# Patient Record
Sex: FEMALE | HISPANIC OR LATINO | Employment: OTHER | URBAN - METROPOLITAN AREA
[De-identification: names, ages, dates, MRNs, and addresses within clinical notes are randomized per-mention and may not be internally consistent; named-entity substitution may affect disease eponyms.]

---

## 2020-10-22 RX ORDER — AMLODIPINE BESYLATE 10 MG/1
10 TABLET ORAL DAILY
COMMUNITY
Start: 2020-10-17 | End: 2021-01-15

## 2020-10-22 RX ORDER — MECLIZINE HCL 12.5 MG/1
12.5 TABLET ORAL
COMMUNITY
Start: 2020-10-17 | End: 2020-10-27

## 2020-10-22 RX ORDER — ATORVASTATIN CALCIUM 20 MG/1
20 TABLET, FILM COATED ORAL DAILY
COMMUNITY

## 2020-10-22 RX ORDER — SERTRALINE HYDROCHLORIDE 25 MG/1
25 TABLET, FILM COATED ORAL DAILY
COMMUNITY
End: 2021-12-08

## 2020-10-22 RX ORDER — LOSARTAN POTASSIUM 100 MG/1
100 TABLET ORAL DAILY
COMMUNITY
Start: 2020-10-16 | End: 2023-04-03

## 2020-10-22 RX ORDER — HYDROCHLOROTHIAZIDE 12.5 MG/1
12.5 TABLET ORAL DAILY
COMMUNITY
Start: 2020-10-18 | End: 2023-04-03

## 2020-10-22 RX ORDER — ASPIRIN 81 MG/1
81 TABLET, CHEWABLE ORAL DAILY
COMMUNITY
Start: 2020-10-18 | End: 2020-11-17

## 2020-10-23 ENCOUNTER — OFFICE VISIT (OUTPATIENT)
Dept: NEUROLOGY | Facility: CLINIC | Age: 85
End: 2020-10-23

## 2020-10-23 VITALS — HEART RATE: 86 BPM | HEIGHT: 60 IN | WEIGHT: 140 LBS | OXYGEN SATURATION: 98 % | BODY MASS INDEX: 27.48 KG/M2

## 2020-10-23 DIAGNOSIS — G43.009 MIGRAINE WITHOUT AURA AND WITHOUT STATUS MIGRAINOSUS, NOT INTRACTABLE: ICD-10-CM

## 2020-10-23 DIAGNOSIS — G47.00 INSOMNIA, UNSPECIFIED TYPE: ICD-10-CM

## 2020-10-23 DIAGNOSIS — R42 VERTIGO: Primary | ICD-10-CM

## 2020-10-23 DIAGNOSIS — Z86.73 HISTORY OF STROKE: ICD-10-CM

## 2020-10-23 DIAGNOSIS — R51.9 CHRONIC DAILY HEADACHE: ICD-10-CM

## 2020-10-23 PROCEDURE — 99204 OFFICE O/P NEW MOD 45 MIN: CPT | Performed by: PSYCHIATRY & NEUROLOGY

## 2020-10-23 RX ORDER — TOPIRAMATE 25 MG/1
25 TABLET ORAL NIGHTLY
Qty: 30 TABLET | Refills: 4 | Status: SHIPPED | OUTPATIENT
Start: 2020-10-23 | End: 2021-02-24 | Stop reason: SDUPTHER

## 2020-10-23 RX ORDER — INFLUENZA A VIRUS A/MICHIGAN/45/2015 X-275 (H1N1) ANTIGEN (FORMALDEHYDE INACTIVATED), INFLUENZA A VIRUS A/SINGAPORE/INFIMH-16-0019/2016 IVR-186 (H3N2) ANTIGEN (FORMALDEHYDE INACTIVATED), INFLUENZA B VIRUS B/PHUKET/3073/2013 ANTIGEN (FORMALDEHYDE INACTIVATED), AND INFLUENZA B VIRUS B/MARYLAND/15/2016 BX-69A ANTIGEN (FORMALDEHYDE INACTIVATED) 60; 60; 60; 60 UG/.7ML; UG/.7ML; UG/.7ML; UG/.7ML
INJECTION, SUSPENSION INTRAMUSCULAR
COMMUNITY
Start: 2020-08-27 | End: 2023-04-03

## 2020-10-23 RX ORDER — ALPRAZOLAM 0.25 MG/1
0.25 TABLET, ORALLY DISINTEGRATING ORAL NIGHTLY PRN
Qty: 30 TABLET | Refills: 1 | Status: SHIPPED | OUTPATIENT
Start: 2020-10-23 | End: 2020-12-28

## 2020-10-23 NOTE — PROGRESS NOTES
Chief Complaint   Patient presents with   • Stroke   • Dizziness   • Headache       Patient ID: Norma Castillo is a 89 y.o. female.    HPI:  I have had the pleasure of seeing your patient today.  As you may know she is a 89-year-old female with a history of stroke.  She was seen acutely at [facility] after experiencing slurred speech and facial droop.  This was back approximately 1 week ago now.  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  They question some type of cardiac issue and she now has a loop recorder.  She was also started on Eliquis and takes aspirin 81 mg daily.  She is taking Lipitor as well now.  She has not had any new strokelike symptoms.  Her daughter who accompanies her today states that she has had vertigo for many years.  It is a room spinning-like sensation that tends to worsen with changes of position.  She has no evidence for vertebrobasilar insufficiency on CTA evaluation during her hospitalization.  The patient also suffers from severe insomnia as well as chronic headaches.  The headaches are a frontal and temporal type head pain.  Occasionally it radiates from the occipital head region.  It can be pounding or pressure-like sensation.  She will have some sensitivity to light.  No nausea or vomiting.  She says that she essentially has a headache every day.  She typically takes Tylenol now that she is taking Eliquis which is somewhat helpful however her headache does typically recur at some point during the day.  She previously had been treated with alprazolam 0.25 mg for her dizziness as well as anxiety.  She has tried meclizine 12.5 mg which was not very helpful.  She denies any new stroke symptoms.  No focal weakness of her arms or legs.  No facial droop.  Her daughter says that her anxiety is quite severe.  As well the dizziness is a daily occurrence for her.    The following portions of the patient's history were reviewed and  updated as appropriate: allergies, current medications, past family history, past medical history, past social history, past surgical history and problem list.    Review of Systems   Constitutional: Negative for activity change, appetite change and fatigue.   HENT: Negative for facial swelling, hearing loss and trouble swallowing.    Eyes: Negative for photophobia, redness and visual disturbance.   Respiratory: Negative for chest tightness, shortness of breath and wheezing.    Cardiovascular: Negative for chest pain, palpitations and leg swelling.   Gastrointestinal: Negative for abdominal pain, nausea and vomiting.   Endocrine: Negative for cold intolerance, heat intolerance and polydipsia.   Musculoskeletal: Negative for back pain, gait problem and neck pain.   Skin: Negative for color change, rash and wound.   Allergic/Immunologic: Negative for environmental allergies, food allergies and immunocompromised state.   Neurological: Positive for dizziness and headaches. Negative for tremors, seizures, syncope, facial asymmetry, speech difficulty, weakness, light-headedness and numbness.   Hematological: Negative for adenopathy. Does not bruise/bleed easily.   Psychiatric/Behavioral: Positive for dysphoric mood. Negative for agitation, behavioral problems, confusion, decreased concentration, hallucinations, self-injury, sleep disturbance and suicidal ideas. The patient is nervous/anxious. The patient is not hyperactive.       I have reviewed the review of systems above performed by my medical assistant.      Vitals:    10/23/20 1513   Pulse: 86   SpO2: 98%       Neurologic Exam     Mental Status   Oriented to person, place, and time.   Registration: recalls 3 of 3 objects. Follows 3 step commands.   Attention: normal. Concentration: normal.   Speech: speech is normal   Level of consciousness: alert  Knowledge: consistent with education (No deficits found.).   Normal comprehension.     Cranial Nerves     CN II   Visual  fields full to confrontation.     CN III, IV, VI   Pupils are equal, round, and reactive to light.  Extraocular motions are normal.   CN III: no CN III palsy  CN VI: no CN VI palsy  Nystagmus: none   Diplopia: none    CN V   Facial sensation intact.     CN VII   Facial expression full, symmetric.     CN VIII   CN VIII normal.     CN IX, X   CN IX normal.   CN X normal.     CN XI   CN XI normal.     CN XII   CN XII normal.     Motor Exam   Muscle bulk: normal  Right arm tone: normal  Left arm tone: normal  Right leg tone: normal  Left leg tone: normal    Strength   Right neck flexion: 5/5  Left neck flexion: 5/5  Right neck extension: 5/5  Left neck extension: 5/5  Right deltoid: 5/5  Left deltoid: 5/5  Right biceps: 5/5  Left biceps: 5/5  Right triceps: 5/5  Left triceps: 5/5  Right wrist flexion: 5/5  Left wrist flexion: 5/5  Right wrist extension: 5/5  Left wrist extension: 5/5  Right interossei: 5/5  Left interossei: 5/5  Right abdominals: 5/5  Left abdominals: 5/5  Right iliopsoas: 5/5  Left iliopsoas: 5/5  Right quadriceps: 5/5  Left quadriceps: 5/5  Right hamstrin/5  Left hamstrin/5  Right glutei: 5/5  Left glutei: 5/5  Right anterior tibial: 5/5  Left anterior tibial: 5/5  Right posterior tibial: 5/5  Left posterior tibial: 5/5  Right peroneal: 5/5  Left peroneal: 5/5  Right gastroc: 5/5  Left gastroc: 5/5    Sensory Exam   Light touch normal.   Vibration normal.   Proprioception normal.   Pinprick normal.     Gait, Coordination, and Reflexes     Gait  Gait: normal    Coordination   Romberg: negative    Tremor   Resting tremor: absent  Intention tremor: absent    Reflexes   Right brachioradialis: 2+  Left brachioradialis: 2+  Right biceps: 2+  Left biceps: 2+  Right triceps: 2+  Left triceps: 2+  Right patellar: 2+  Left patellar: 2+  Right achilles: 2+  Left achilles: 2+  Right : 2+  Left : 2+Station is normal.       Physical Exam  Vitals signs reviewed.   Constitutional:       General: She  is not in acute distress.     Appearance: She is well-developed.   HENT:      Head: Normocephalic and atraumatic.   Eyes:      Extraocular Movements: EOM normal.      Pupils: Pupils are equal, round, and reactive to light.   Neck:      Musculoskeletal: Normal range of motion.   Cardiovascular:      Rate and Rhythm: Normal rate and regular rhythm.      Heart sounds: Normal heart sounds.   Pulmonary:      Effort: Pulmonary effort is normal. No respiratory distress.      Breath sounds: Normal breath sounds.   Abdominal:      General: Bowel sounds are normal. There is no distension.      Palpations: Abdomen is soft.      Tenderness: There is no abdominal tenderness.   Musculoskeletal:         General: No deformity.   Skin:     General: Skin is warm.      Findings: No rash.   Neurological:      Mental Status: She is oriented to person, place, and time.      Coordination: Romberg Test normal.      Gait: Gait is intact.      Deep Tendon Reflexes:      Reflex Scores:       Tricep reflexes are 2+ on the right side and 2+ on the left side.       Bicep reflexes are 2+ on the right side and 2+ on the left side.       Brachioradialis reflexes are 2+ on the right side and 2+ on the left side.       Patellar reflexes are 2+ on the right side and 2+ on the left side.       Achilles reflexes are 2+ on the right side and 2+ on the left side.  Psychiatric:         Speech: Speech normal.         Judgment: Judgment normal.         Procedures    Assessment/Plan: I would like to start her on low-dose Topamax 25 mg at night to try and bring the headache frequency down for her.  As well we will go ahead and start her back on the alprazolam since she was taking that for many years for the dizziness and the anxiety.  We will use 0.25 mg nightly.  Her Zoloft was recently increased to 50 mg daily so hopefully that will also help with the anxiety.  I would like to refer her to the [facility] for a vestibular assessment.  We will  see her back after this evaluation.       Diagnoses and all orders for this visit:    1. Vertigo (Primary)  -     ALPRAZolam (NIRAVAM) 0.25 MG disintegrating tablet; Place 1 tablet on the tongue At Night As Needed for Anxiety for up to 30 days.  Dispense: 30 tablet; Refill: 1  -     Basic Vestibular Evaluation; Future    2. History of stroke    3. Chronic daily headache  -     topiramate (TOPAMAX) 25 MG tablet; Take 1 tablet by mouth Every Night for 30 days.  Dispense: 30 tablet; Refill: 4    4. Migraine without aura and without status migrainosus, not intractable  -     topiramate (TOPAMAX) 25 MG tablet; Take 1 tablet by mouth Every Night for 30 days.  Dispense: 30 tablet; Refill: 4    5. Insomnia, unspecified type  -     ALPRAZolam (NIRAVAM) 0.25 MG disintegrating tablet; Place 1 tablet on the tongue At Night As Needed for Anxiety for up to 30 days.  Dispense: 30 tablet; Refill: 1           Jeremiah Garcia II, MD           No

## 2020-12-26 DIAGNOSIS — G47.00 INSOMNIA, UNSPECIFIED TYPE: ICD-10-CM

## 2020-12-26 DIAGNOSIS — R42 VERTIGO: ICD-10-CM

## 2020-12-31 RX ORDER — ALPRAZOLAM 0.25 MG/1
TABLET, ORALLY DISINTEGRATING ORAL
Qty: 30 TABLET | Refills: 0 | Status: SHIPPED | OUTPATIENT
Start: 2020-12-31 | End: 2021-01-27

## 2021-01-27 DIAGNOSIS — R42 VERTIGO: ICD-10-CM

## 2021-01-27 DIAGNOSIS — G47.00 INSOMNIA, UNSPECIFIED TYPE: ICD-10-CM

## 2021-01-27 RX ORDER — ALPRAZOLAM 0.25 MG/1
TABLET, ORALLY DISINTEGRATING ORAL
Qty: 30 TABLET | Refills: 2 | Status: SHIPPED | OUTPATIENT
Start: 2021-01-27 | End: 2021-08-06

## 2021-01-27 NOTE — TELEPHONE ENCOUNTER
Please advise. It looks like your started this on pt apt on 10/23/2020. It was sent in 3 weeks ago. Please advise. Pt has a f/u on 02/24/2021. Thank you.

## 2021-02-24 ENCOUNTER — TELEMEDICINE (OUTPATIENT)
Dept: NEUROLOGY | Facility: CLINIC | Age: 86
End: 2021-02-24

## 2021-02-24 DIAGNOSIS — R51.9 CHRONIC DAILY HEADACHE: ICD-10-CM

## 2021-02-24 DIAGNOSIS — G43.009 MIGRAINE WITHOUT AURA AND WITHOUT STATUS MIGRAINOSUS, NOT INTRACTABLE: ICD-10-CM

## 2021-02-24 DIAGNOSIS — R42 VERTIGO: Primary | ICD-10-CM

## 2021-02-24 DIAGNOSIS — Z86.73 HISTORY OF STROKE: ICD-10-CM

## 2021-02-24 PROCEDURE — 99212 OFFICE O/P EST SF 10 MIN: CPT | Performed by: PSYCHIATRY & NEUROLOGY

## 2021-02-24 RX ORDER — TOPIRAMATE 25 MG/1
25 TABLET ORAL NIGHTLY
Qty: 30 TABLET | Refills: 4 | Status: SHIPPED | OUTPATIENT
Start: 2021-02-24 | End: 2021-08-06 | Stop reason: SDUPTHER

## 2021-02-24 NOTE — PROGRESS NOTES
Chief complaint: History of stroke, vertigo and migraines    Patient ID: Norma Castillo is a 89 y.o. female.    HPI: This was an audio and video enabled telemedicine encounter.  The patient did consent to this type of encounter.  I am in the neurology clinic and they are at home.  I have had the pleasure of seeing Norma today in the neurology clinic.  As you may know she is a 89-year-old female with a history of those stated above.  She says that she is doing better.  She has not had that many headaches since her last visit with us.  We did start her on topiramate 25 mg at night.  She has not had any side effects to the medication.  She reports no significant headaches since the initiation of medication.  She does have a history of vertigo and we tried to schedule her a visit with the [facility].  She has not yet seen them stating that they were told that they would get a call back regarding a future visit.    The following portions of the patient's history were reviewed and updated as appropriate: allergies, current medications, past family history, past medical history, past social history, past surgical history and problem list.    Review of Systems   I have reviewed the review of systems above performed by my medical assistant.      There were no vitals filed for this visit.    Neurologic Exam    Physical Exam    Procedures    Assessment/Plan: We will continue with her topiramate for now.  With respect to the [facility] she will try and contact them to get scheduled if they need assistance from us we will do so.  Otherwise we will see them back after that visit.  A total of 15 minutes was spent during this visit discussing signs and symptoms of chronic daily headache, migraine headache, vertigo, patient education, plan of care and prognosis.       Diagnoses and all orders for this visit:    1. Vertigo (Primary)    2. History of stroke    3. Migraine without aura and without status  migrainosus, not intractable  -     topiramate (TOPAMAX) 25 MG tablet; Take 1 tablet by mouth Every Night for 30 days.  Dispense: 30 tablet; Refill: 4    4. Chronic daily headache  -     topiramate (TOPAMAX) 25 MG tablet; Take 1 tablet by mouth Every Night for 30 days.  Dispense: 30 tablet; Refill: 4           Jeremiah Garcia II, MD

## 2021-03-09 DIAGNOSIS — Z23 IMMUNIZATION DUE: ICD-10-CM

## 2021-06-09 RX ORDER — ALPRAZOLAM 0.25 MG/1
TABLET ORAL
Qty: 30 TABLET | Refills: 5 | Status: SHIPPED | OUTPATIENT
Start: 2021-06-09 | End: 2021-08-06

## 2021-06-21 ENCOUNTER — TELEPHONE (OUTPATIENT)
Dept: NEUROLOGY | Facility: CLINIC | Age: 86
End: 2021-06-21

## 2021-06-21 NOTE — TELEPHONE ENCOUNTER
Provider: DR NAIR    Caller: MIMI     Relationship to Patient: DAUGHTER    Phone Number: [phone]    Reason for Call: THE PT'S DAUGHTER CALLED IN TODAY BECAUSE SHE WANTED TO SPEAK WITH HERMAN REGARDING SOME PAPERWORK AND THIS IS ALL OF THE INFORMATION SHE GAVE ME. PLEASE GIVE HER A CALL BACK WHEN POSSIBLE.

## 2021-06-25 NOTE — TELEPHONE ENCOUNTER
MIMI - DAUGHTER  [phone]    THE PT'S DAUGHTER WAS RETURNING HERMAN'S PHONE CALL. PER LINDA SPARROWHEL WAS OUT OF THE OFFICE TODAY AND WILL BE BACK Monday. THE PT'S DAUGHTER EXPRESSED UNDERSTANDING. PLEASE GIVE HER A CALL BACK Monday.

## 2021-06-28 NOTE — TELEPHONE ENCOUNTER
Called and spoke to marivel. We will be receiving some paperwork  On pt and will review and fill out best we can per DR. Garcia.

## 2021-08-06 ENCOUNTER — OFFICE VISIT (OUTPATIENT)
Dept: NEUROLOGY | Facility: CLINIC | Age: 86
End: 2021-08-06

## 2021-08-06 VITALS
WEIGHT: 138 LBS | OXYGEN SATURATION: 98 % | DIASTOLIC BLOOD PRESSURE: 70 MMHG | HEART RATE: 84 BPM | BODY MASS INDEX: 27.09 KG/M2 | HEIGHT: 60 IN | SYSTOLIC BLOOD PRESSURE: 114 MMHG

## 2021-08-06 DIAGNOSIS — R51.9 CHRONIC DAILY HEADACHE: ICD-10-CM

## 2021-08-06 DIAGNOSIS — G47.00 INSOMNIA, UNSPECIFIED TYPE: ICD-10-CM

## 2021-08-06 DIAGNOSIS — R41.89 COGNITIVE IMPAIRMENT: ICD-10-CM

## 2021-08-06 DIAGNOSIS — G43.009 MIGRAINE WITHOUT AURA AND WITHOUT STATUS MIGRAINOSUS, NOT INTRACTABLE: ICD-10-CM

## 2021-08-06 DIAGNOSIS — Z86.73 HISTORY OF STROKE: Primary | ICD-10-CM

## 2021-08-06 DIAGNOSIS — R42 VERTIGO: ICD-10-CM

## 2021-08-06 PROCEDURE — 99212 OFFICE O/P EST SF 10 MIN: CPT | Performed by: PSYCHIATRY & NEUROLOGY

## 2021-08-06 RX ORDER — TOPIRAMATE 25 MG/1
25 TABLET ORAL NIGHTLY
Qty: 90 TABLET | Refills: 2 | Status: SHIPPED | OUTPATIENT
Start: 2021-08-06 | End: 2022-02-08

## 2021-08-06 RX ORDER — ASPIRIN 81 MG/1
TABLET ORAL DAILY
COMMUNITY

## 2021-08-06 RX ORDER — ALPRAZOLAM 0.25 MG/1
0.25 TABLET ORAL NIGHTLY PRN
Qty: 90 TABLET | Refills: 2 | Status: SHIPPED | OUTPATIENT
Start: 2021-08-06 | End: 2022-04-29

## 2021-08-06 NOTE — PROGRESS NOTES
Chief Complaint   Patient presents with   • Stroke       Patient ID: Norma Castillo is a 89 y.o. female.    HPI: I have had the pleasure of seeing your patient today. As you may know she is a 89-year-old female with a history of stroke. She has a history of chronic headaches and dizziness. She is scheduled to see the [facility] soon. She is doing much better with respect to headache. She is taking topiramate 25 mg nightly. She denies any side effects. She denies any new stroke symptoms. No new onset focal weakness or numbness. Cognitively she is approximately the same.    The following portions of the patient's history were reviewed and updated as appropriate: allergies, current medications, past family history, past medical history, past social history, past surgical history and problem list.    Review of Systems   Musculoskeletal: Positive for gait problem (pt feel last week due to slippers. ). Negative for back pain and neck pain.   Allergic/Immunologic: Negative for environmental allergies, food allergies and immunocompromised state.   Neurological: Negative for dizziness, tremors, seizures, syncope, facial asymmetry, speech difficulty, weakness, light-headedness, numbness and headaches.   Hematological: Negative for adenopathy. Does not bruise/bleed easily.   Psychiatric/Behavioral: Negative for agitation, behavioral problems, confusion, decreased concentration, dysphoric mood, hallucinations, self-injury, sleep disturbance and suicidal ideas. The patient is not nervous/anxious and is not hyperactive.       I have reviewed the review of systems above performed by my medical assistant.      Vitals:    08/06/21 1118   BP: 114/70   Pulse: 84   SpO2: 98%       Neurologic Exam     Mental Status   Oriented to person, place, and time.   Registration: recalls 1 of 3 objects. Recall at 5 minutes: recalls 1 of 3 objects. Follows 3 step commands.   Attention: normal. Concentration: normal.   Speech: speech is  normal   Level of consciousness: alert  Knowledge: poor.   Normal comprehension.     Cranial Nerves     CN II   Visual fields full to confrontation.     CN III, IV, VI   Pupils are equal, round, and reactive to light.  Extraocular motions are normal.   CN III: no CN III palsy  CN VI: no CN VI palsy  Nystagmus: none   Diplopia: none    CN V   Facial sensation intact.     CN VII   Facial expression full, symmetric.     CN VIII   CN VIII normal.     CN IX, X   CN IX normal.   CN X normal.     CN XI   CN XI normal.     CN XII   CN XII normal.     Motor Exam   Muscle bulk: normal  Right arm tone: normal  Left arm tone: normal  Right leg tone: normal  Left leg tone: normal    Strength   Right neck flexion: 5/5  Left neck flexion: 5/5  Right neck extension: 5/5  Left neck extension: 5/5  Right deltoid: 5/5  Left deltoid: 5/5  Right biceps: 5/5  Left biceps: 5/5  Right triceps: 5/5  Left triceps: 5/5  Right wrist flexion: 5/5  Left wrist flexion: 5/5  Right wrist extension: 5/5  Left wrist extension: 5/5  Right interossei: 5/5  Left interossei: 5/5  Right abdominals: 5/5  Left abdominals: 5/5  Right iliopsoas: 5/5  Left iliopsoas: 5/5  Right quadriceps: 5/5  Left quadriceps: 5/5  Right hamstrin/5  Left hamstrin/5  Right glutei: 5/5  Left glutei: 5/5  Right anterior tibial: 5/5  Left anterior tibial: 5/5  Right posterior tibial: 5/5  Left posterior tibial: 5/5  Right peroneal: 5/5  Left peroneal: 5/5  Right gastroc: 5/5  Left gastroc: 5/5    Sensory Exam   Light touch normal.   Vibration normal.   Proprioception normal.   Pinprick normal.     Gait, Coordination, and Reflexes     Gait  Gait: normal    Coordination   Romberg: negative    Tremor   Resting tremor: absent  Intention tremor: absent    Reflexes   Right brachioradialis: 2+  Left brachioradialis: 2+  Right biceps: 2+  Left biceps: 2+  Right triceps: 2+  Left triceps: 2+  Right patellar: 2+  Left patellar: 2+  Right achilles: 2+  Left achilles: 2+  Right :  2+  Left : 2+Station is normal.       Physical Exam  Vitals reviewed.   Constitutional:       Appearance: She is well-developed.   HENT:      Head: Normocephalic and atraumatic.   Eyes:      Extraocular Movements: EOM normal.      Pupils: Pupils are equal, round, and reactive to light.   Cardiovascular:      Rate and Rhythm: Normal rate and regular rhythm.   Pulmonary:      Breath sounds: Normal breath sounds.   Musculoskeletal:         General: Normal range of motion.   Skin:     General: Skin is warm.   Neurological:      Mental Status: She is oriented to person, place, and time.      Coordination: Romberg Test normal.      Gait: Gait is intact.      Deep Tendon Reflexes:      Reflex Scores:       Tricep reflexes are 2+ on the right side and 2+ on the left side.       Bicep reflexes are 2+ on the right side and 2+ on the left side.       Brachioradialis reflexes are 2+ on the right side and 2+ on the left side.       Patellar reflexes are 2+ on the right side and 2+ on the left side.       Achilles reflexes are 2+ on the right side and 2+ on the left side.  Psychiatric:         Speech: Speech normal.         Procedures    Assessment/Plan: We will refill her alprazolam and topiramate. We will see her in approximately 4 months or sooner if needed. A total of 15 minutes was spent face-to-face with the patient today. Of that greater than 50% of this time was spent discussing signs and symptoms of dizziness, headaches, history of stroke, cognitive impairment, patient education, plan of care and prognosis.       Diagnoses and all orders for this visit:    1. History of stroke (Primary)    2. Vertigo    3. Chronic daily headache  -     topiramate (TOPAMAX) 25 MG tablet; Take 1 tablet by mouth Every Night for 90 days.  Dispense: 90 tablet; Refill: 2    4. Cognitive impairment    5. Migraine without aura and without status migrainosus, not intractable  -     topiramate (TOPAMAX) 25 MG tablet; Take 1 tablet by mouth  Every Night for 90 days.  Dispense: 90 tablet; Refill: 2    6. Insomnia, unspecified type  -     ALPRAZolam (XANAX) 0.25 MG tablet; Take 1 tablet by mouth At Night As Needed for Anxiety for up to 90 days.  Dispense: 90 tablet; Refill: 2           Jeremiah Garcia II, MD

## 2021-09-28 ENCOUNTER — IMMUNIZATION (OUTPATIENT)
Dept: VACCINE CLINIC | Facility: HOSPITAL | Age: 86
End: 2021-09-28

## 2021-09-28 PROCEDURE — 0001A: CPT | Performed by: INTERNAL MEDICINE

## 2021-09-28 PROCEDURE — 0003A: CPT | Performed by: INTERNAL MEDICINE

## 2021-09-28 PROCEDURE — 91300 HC SARSCOV02 VAC 30MCG/0.3ML IM: CPT | Performed by: INTERNAL MEDICINE

## 2021-12-08 ENCOUNTER — OFFICE VISIT (OUTPATIENT)
Dept: NEUROLOGY | Facility: CLINIC | Age: 86
End: 2021-12-08

## 2021-12-08 VITALS
BODY MASS INDEX: 27.88 KG/M2 | HEIGHT: 60 IN | WEIGHT: 142 LBS | OXYGEN SATURATION: 98 % | DIASTOLIC BLOOD PRESSURE: 76 MMHG | HEART RATE: 76 BPM | SYSTOLIC BLOOD PRESSURE: 122 MMHG

## 2021-12-08 DIAGNOSIS — R42 VERTIGO: ICD-10-CM

## 2021-12-08 DIAGNOSIS — Z86.73 HISTORY OF STROKE: Primary | ICD-10-CM

## 2021-12-08 DIAGNOSIS — F33.0 MILD RECURRENT MAJOR DEPRESSION (HCC): ICD-10-CM

## 2021-12-08 PROCEDURE — 99212 OFFICE O/P EST SF 10 MIN: CPT | Performed by: PSYCHIATRY & NEUROLOGY

## 2021-12-08 RX ORDER — SERTRALINE HYDROCHLORIDE 25 MG/1
25 TABLET, FILM COATED ORAL DAILY
Qty: 30 TABLET | Refills: 4 | Status: SHIPPED | OUTPATIENT
Start: 2021-12-08 | End: 2021-12-13
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
## 2021-12-09 ENCOUNTER — TELEPHONE (OUTPATIENT)
Dept: NEUROLOGY | Facility: CLINIC | Age: 86
End: 2021-12-09

## 2021-12-09 DIAGNOSIS — F33.0 MILD RECURRENT MAJOR DEPRESSION (HCC): Primary | ICD-10-CM

## 2021-12-09 NOTE — TELEPHONE ENCOUNTER
Caller: MIMI Bhat call back number: [phone]    What medications are you currently taking:   Current Outpatient Medications on File Prior to Visit   Medication Sig Dispense Refill   • aspirin (aspirin) 81 MG EC tablet Daily.     • atorvastatin (LIPITOR) 20 MG tablet Take 20 mg by mouth Daily.     • Fluzone High-Dose Quadrivalent 0.7 ML suspension prefilled syringe TO BE ADMINISTERED BY PHARMACIST FOR IMMUNIZATION     • hydroCHLOROthiazide (HYDRODIURIL) 12.5 MG tablet Take 12.5 mg by mouth Daily.     • losartan (COZAAR) 100 MG tablet Take 100 mg by mouth Daily.     • metoprolol tartrate (LOPRESSOR) 25 MG tablet Take 25 mg by mouth.     • sertraline (ZOLOFT) 25 MG tablet Take 1 tablet by mouth Daily for 30 days. 30 tablet 4   • topiramate (TOPAMAX) 25 MG tablet Take 1 tablet by mouth Every Night for 90 days. 90 tablet 2     No current facility-administered medications on file prior to visit.          When did you start taking these medications: THINKS ABOUT 5 YEARS AGO     Which medication are you concerned about: ZOLOFT DAUGHTER STATES IT  MG 1/2 PIL PER DAY     Who prescribed you this medication: DR NAIR        How long have you had these concerns: PT HAS BEEN TAKING 50 MG FOR LAST 5 YEARS  DAUGHTER HAS BEEN CUTTING THEM IN HALF THIS RX  MG PRESCRIBED BY PCP PLEASE ADVISE ON WHAT SHE SHOULD DO NEXT.       PHARMACY IS [address] [address] [facility] AT [facility] [place] - [phone] [facility] [phone]   [phone]      PLEASE ADVISE

## 2021-12-09 NOTE — TELEPHONE ENCOUNTER
Please advise. It looks like pt is already taking 50 mg daily of zoloft. You sent in 25mg at visit yesterday for pt to start. This was not reported for the medication list by the daughter yesterday. Please review. Thank you.

## 2021-12-13 RX ORDER — SERTRALINE HYDROCHLORIDE 100 MG/1
100 TABLET, FILM COATED ORAL DAILY
Qty: 30 TABLET | Refills: 2 | Status: SHIPPED | OUTPATIENT
Start: 2021-12-13 | End: 2023-04-03

## 2022-02-08 ENCOUNTER — TELEPHONE (OUTPATIENT)
Dept: NEUROLOGY | Facility: CLINIC | Age: 87
End: 2022-02-08

## 2022-02-08 DIAGNOSIS — G43.009 MIGRAINE WITHOUT AURA AND WITHOUT STATUS MIGRAINOSUS, NOT INTRACTABLE: Primary | ICD-10-CM

## 2022-02-08 DIAGNOSIS — R51.9 CHRONIC DAILY HEADACHE: ICD-10-CM

## 2022-02-08 RX ORDER — TOPIRAMATE 50 MG/1
50 TABLET, FILM COATED ORAL NIGHTLY
Qty: 30 TABLET | Refills: 5 | Status: SHIPPED | OUTPATIENT
Start: 2022-02-08 | End: 2023-01-27

## 2022-02-08 NOTE — TELEPHONE ENCOUNTER
Provider: DR. NAIR    Caller: MIMI LANDAVERDE    Relationship to Patient: DAUGHTER    Pharmacy: [address] [address] [facility] AT [facility] [phone] [facility] [phone] [facility]    Phone Number: [phone]    When was the patient last seen: 12/8/21    Reason for Call: RETURNED HEADACHES THAT OCCUR DAILY; DIFFICULTY SLEEPING DUE TO CONSTANT HEADACHES.    When did it start: APPROX. 3 WEEKS AGO.    Where is it located: BACK OF HEAD.    Characteristics of symptom/severity: CONSTANT HEADACHES THAT COME & GO. PT'S DAUGHTER REPORTS THAT PT IS ONLY SLEEPING 3-4 HOURS A NIGHT DUE TO HEADACHE PAIN.    Timing- Is it constant or intermittent: CONSTANT COMING & GOING OF HEADACHES.    What therapies/medications have you tried: TAKES EXTRA STRENGTH TYLENOL, WHICH HELPS WITH HEADACHE PAIN. PT'S DAUGHTER STATES SHE WOULD PREFER PT NOT HAVE TO TAKE TYLENOL EVERY DAY. PT'S DAUGHTER FEARS OF GIVING PT TOO MUCH.     PT'S DAUGHTER ALSO HAS GROWING CONCERNS REGARDING PT'S DEPRESSION. STATES THAT SHE BELIEVES THAT THIS EFFECTS PT'S HEADACHES AS WELL.    PLEASE REVIEW AND ADVISE.

## 2022-03-11 ENCOUNTER — OFFICE VISIT (OUTPATIENT)
Dept: NEUROLOGY | Facility: CLINIC | Age: 87
End: 2022-03-11

## 2022-03-11 VITALS
SYSTOLIC BLOOD PRESSURE: 112 MMHG | BODY MASS INDEX: 27.56 KG/M2 | HEART RATE: 62 BPM | DIASTOLIC BLOOD PRESSURE: 66 MMHG | OXYGEN SATURATION: 97 % | HEIGHT: 60 IN | WEIGHT: 140.4 LBS

## 2022-03-11 DIAGNOSIS — Z86.73 HISTORY OF STROKE: ICD-10-CM

## 2022-03-11 DIAGNOSIS — R42 VERTIGO: Primary | ICD-10-CM

## 2022-03-11 DIAGNOSIS — G43.009 MIGRAINE WITHOUT AURA AND WITHOUT STATUS MIGRAINOSUS, NOT INTRACTABLE: ICD-10-CM

## 2022-03-11 DIAGNOSIS — F33.0 MILD RECURRENT MAJOR DEPRESSION: ICD-10-CM

## 2022-03-11 PROCEDURE — 99214 OFFICE O/P EST MOD 30 MIN: CPT | Performed by: PSYCHIATRY & NEUROLOGY

## 2022-03-11 RX ORDER — VALSARTAN 80 MG/1
TABLET ORAL
COMMUNITY
End: 2023-04-03

## 2022-03-11 NOTE — PROGRESS NOTES
Chief Complaint   Patient presents with   • Stroke       Patient ID: Norma Castillo is a 89 y.o. female.    HPI: I had the pleasure of seeing your patient again today.  As you may know she is a 89-year-old female with a history of stroke.  She is here for the continued management of migraine and dizziness.  She is also here for the management of depression and anxiety.  She has been doing better since titrating the dose of sertraline and topiramate.  She is no longer experiencing significant headaches.  No worsening issues related to depression and anxiety.  In fact the patient states that she feels much better.  She is eating well.  Her diet is good.  She is getting good sleep.  Her  does have Alzheimer's disease and she has had some issues coping with those changes however again she is feeling better from that perspective.  She is still taking topiramate 50 mg at night as well as Zoloft 100 mg daily.  She is not having any dizzy spells either.  That seems to have resolved also.    The following portions of the patient's history were reviewed and updated as appropriate: allergies, current medications, past family history, past medical history, past social history, past surgical history and problem list.    Review of Systems   Endocrine: Negative for cold intolerance, heat intolerance and polydipsia.   Genitourinary: Negative for decreased urine volume, difficulty urinating and urgency.   Musculoskeletal: Negative for back pain, neck pain and neck stiffness.   Skin: Negative for color change, rash and wound.   Allergic/Immunologic: Negative for environmental allergies, food allergies and immunocompromised state.   Neurological: Negative for dizziness, tremors, seizures, syncope, facial asymmetry, speech difficulty, weakness, light-headedness, numbness and headaches.   Hematological: Negative for adenopathy. Does not bruise/bleed easily.   Psychiatric/Behavioral: Negative for confusion and sleep disturbance. The  patient is nervous/anxious (depression).       I have reviewed the review of systems above performed by my medical assistant.      Vitals:    22 1524   BP: 112/66   Pulse: 62   SpO2: 97%       Neurologic Exam     Mental Status   Oriented to person, place, and time.   Concentration: normal.   Level of consciousness: alert  Knowledge: consistent with education (No deficits found.).     Cranial Nerves     CN II   Visual fields full to confrontation.     CN III, IV, VI   Pupils are equal, round, and reactive to light.  Extraocular motions are normal.   CN III: no CN III palsy  CN VI: no CN VI palsy    CN V   Facial sensation intact.     CN VII   Facial expression full, symmetric.     CN VIII   CN VIII normal.     CN IX, X   CN IX normal.   CN X normal.     CN XI   CN XI normal.     CN XII   CN XII normal.     Motor Exam     Strength   Right neck flexion: 5/5  Left neck flexion: 5/5  Right neck extension: 5/5  Left neck extension: 5/5  Right deltoid: 5/5  Left deltoid: 5/5  Right biceps: 5/5  Left biceps: 5/5  Right triceps: 5/5  Left triceps: 5/5  Right wrist flexion: 5/5  Left wrist flexion: 5/5  Right wrist extension: 5/5  Left wrist extension: 5/5  Right interossei: 5/5  Left interossei: 5/5  Right abdominals: 5/5  Left abdominals: 5/5  Right iliopsoas: 5/5  Left iliopsoas: 5/5  Right quadriceps: 5/5  Left quadriceps: 5/5  Right hamstrin/5  Left hamstrin/5  Right glutei: 5/5  Left glutei: 5/5  Right anterior tibial: 5/5  Left anterior tibial: 5/5  Right posterior tibial: 5/5  Left posterior tibial: 5/5  Right peroneal: 5/5  Left peroneal: 5/5  Right gastroc: 5/5  Left gastroc: 5/5    Sensory Exam   Light touch normal.   Vibration normal.     Gait, Coordination, and Reflexes     Gait  Gait: normal    Reflexes   Right brachioradialis: 2+  Left brachioradialis: 2+  Right biceps: 2+  Left biceps: 2+  Right triceps: 2+  Left triceps: 2+  Right patellar: 2+  Left patellar: 2+  Right achilles: 2+  Left  achilles: 2+  Right : 2+  Left : 2+Station is normal.       Physical Exam  Vitals reviewed.   Constitutional:       Appearance: She is well-developed.   HENT:      Head: Normocephalic and atraumatic.   Eyes:      Extraocular Movements: EOM normal.      Pupils: Pupils are equal, round, and reactive to light.   Cardiovascular:      Rate and Rhythm: Normal rate and regular rhythm.   Pulmonary:      Breath sounds: Normal breath sounds.   Musculoskeletal:         General: Normal range of motion.   Skin:     General: Skin is warm.   Neurological:      Mental Status: She is oriented to person, place, and time.      Gait: Gait is intact.      Deep Tendon Reflexes:      Reflex Scores:       Tricep reflexes are 2+ on the right side and 2+ on the left side.       Bicep reflexes are 2+ on the right side and 2+ on the left side.       Brachioradialis reflexes are 2+ on the right side and 2+ on the left side.       Patellar reflexes are 2+ on the right side and 2+ on the left side.       Achilles reflexes are 2+ on the right side and 2+ on the left side.        Procedures    Assessment/Plan: Our plan is to wean the topiramate back down to 25 mg daily.  May consider weaning off completely if she is still doing well at her next visit in 4 months.  We will continue the Zoloft at the current dose of 100 mg daily.  A total of 30 minutes was spent face-to-face with the patient today.  Of that greater than 50% of this time was spent discussing signs and symptoms of anxiety and depression, migraine headaches, dizziness, patient education, plan of care and prognosis.       Diagnoses and all orders for this visit:    1. Vertigo (Primary)    2. History of stroke    3. Migraine without aura and without status migrainosus, not intractable    4. Mild recurrent major depression (HCC)           Jeremiah Garcia II, MD

## 2022-04-29 DIAGNOSIS — R51.9 CHRONIC DAILY HEADACHE: ICD-10-CM

## 2022-04-29 DIAGNOSIS — G47.00 INSOMNIA, UNSPECIFIED TYPE: ICD-10-CM

## 2022-04-29 DIAGNOSIS — G43.009 MIGRAINE WITHOUT AURA AND WITHOUT STATUS MIGRAINOSUS, NOT INTRACTABLE: ICD-10-CM

## 2022-04-29 RX ORDER — ALPRAZOLAM 0.25 MG/1
TABLET ORAL
Qty: 90 TABLET | Refills: 4 | Status: SHIPPED | OUTPATIENT
Start: 2022-04-29 | End: 2022-11-04

## 2022-04-29 RX ORDER — TOPIRAMATE 25 MG/1
TABLET ORAL
Qty: 90 TABLET | Refills: 2 | Status: SHIPPED | OUTPATIENT
Start: 2022-04-29 | End: 2023-04-03

## 2022-05-13 ENCOUNTER — IMMUNIZATION (OUTPATIENT)
Dept: VACCINE CLINIC | Facility: HOSPITAL | Age: 87
End: 2022-05-13

## 2022-05-13 DIAGNOSIS — Z23 NEED FOR VACCINATION: Primary | ICD-10-CM

## 2022-05-13 PROCEDURE — 0054A HC ADM SARSCV2 30MCG TRS-SUCR BOOSTER: CPT | Performed by: INTERNAL MEDICINE

## 2022-05-13 PROCEDURE — 91305 HC SARSCOV2 VAC 30 MCG TRS-SUCR PFIZER: CPT | Performed by: INTERNAL MEDICINE

## 2022-06-21 ENCOUNTER — TELEPHONE (OUTPATIENT)
Dept: NEUROLOGY | Facility: CLINIC | Age: 87
End: 2022-06-21

## 2022-06-21 NOTE — TELEPHONE ENCOUNTER
Caller: MIMI LANDAVERDE    Relationship: Emergency Contact; DAUGHTER    Best call back number: [phone]    What was the call regarding: PT'S DAUGHTER CALLED TO RESCHEDULE PT'S F/U APPT W/ DR. NAIR TOMORROW, 6/22/22. PT'S DAUGHTER, WHO WOULD BE BRINGING PT TO THE APPT, HAS BEEN EXPOSED TO COVID-19 & IS WAITING TO BE TESTED.    HUB UNABLE TO RESCHEDULE WITHIN THE SUGGESTED F/U TIMEFRAME.    Do you require a callback: YES, PLEASE.    PLEASE REVIEW AND ADVISE.

## 2022-10-05 ENCOUNTER — OFFICE VISIT (OUTPATIENT)
Dept: NEUROLOGY | Facility: CLINIC | Age: 87
End: 2022-10-05

## 2022-10-05 VITALS
WEIGHT: 141 LBS | HEIGHT: 60 IN | SYSTOLIC BLOOD PRESSURE: 116 MMHG | OXYGEN SATURATION: 98 % | HEART RATE: 96 BPM | BODY MASS INDEX: 27.68 KG/M2 | DIASTOLIC BLOOD PRESSURE: 82 MMHG

## 2022-10-05 DIAGNOSIS — G43.009 MIGRAINE WITHOUT AURA AND WITHOUT STATUS MIGRAINOSUS, NOT INTRACTABLE: Primary | ICD-10-CM

## 2022-10-05 DIAGNOSIS — R41.89 COGNITIVE IMPAIRMENT: ICD-10-CM

## 2022-10-05 DIAGNOSIS — F33.0 MILD RECURRENT MAJOR DEPRESSION: ICD-10-CM

## 2022-10-05 PROCEDURE — 99215 OFFICE O/P EST HI 40 MIN: CPT | Performed by: PSYCHIATRY & NEUROLOGY

## 2022-10-05 NOTE — PROGRESS NOTES
Chief Complaint   Patient presents with   • Hx of Stroke    • Dizziness       Patient ID: Norma Castillo is a 89 y.o. female.    HPI: I had the pleasure of seeing your patient again today.  As you may know 89-year-old female here for the management of migraine headaches, depression as well as a history of stroke.  She has had some issues with memory change.  Her daughter who accompanies her says that she has been somewhat more forgetful.  She has moments where she is more interactive with family however for the most part she stays to herself and she also prays frequently.  Her daughter says that she no longer cooks.  She seems quite anxious a lot.  She is taking Topamax for the headaches as well as Zoloft.  No new stroke symptoms.  She is dealing with the changes her  is going through.  He is in hospice care for progressive dementia.    The following portions of the patient's history were reviewed and updated as appropriate: allergies, current medications, past family history, past medical history, past social history, past surgical history and problem list.    Review of Systems   Musculoskeletal: Negative for back pain, gait problem and neck pain.   Neurological: Negative for dizziness, tremors, seizures, syncope, facial asymmetry, speech difficulty, weakness, light-headedness, numbness and headaches.   Hematological: Negative for adenopathy. Does not bruise/bleed easily.   Psychiatric/Behavioral: Positive for sleep disturbance. Negative for agitation, behavioral problems, confusion, decreased concentration, dysphoric mood, hallucinations, self-injury and suicidal ideas. The patient is not nervous/anxious and is not hyperactive.       I have reviewed the review of systems above performed by my medical assistant.      Vitals:    10/05/22 1501   BP: 116/82   Pulse: 96   SpO2: 98%       Neurologic Exam     Mental Status   Disoriented to year. Oriented to month and day.   Concentration: normal.   Level of  consciousness: alert  Knowledge: consistent with education (No deficits found.).     Cranial Nerves     CN II   Visual fields full to confrontation.     CN III, IV, VI   Pupils are equal, round, and reactive to light.  Extraocular motions are normal.   CN III: no CN III palsy  CN VI: no CN VI palsy    CN V   Facial sensation intact.     CN VII   Facial expression full, symmetric.     CN VIII   CN VIII normal.     CN IX, X   CN IX normal.   CN X normal.     CN XI   CN XI normal.     CN XII   CN XII normal.     Motor Exam     Strength   Right neck flexion: 5/5  Left neck flexion: 5/5  Right neck extension: 5/5  Left neck extension: 5/5  Right deltoid: 5/5  Left deltoid: 5/5  Right biceps: 5/5  Left biceps: 5/5  Right triceps: 5/5  Left triceps: 5/5  Right wrist flexion: 5/5  Left wrist flexion: 5/5  Right wrist extension: 5/5  Left wrist extension: 5/5  Right interossei: 5/5  Left interossei: 5/5  Right abdominals: 5/5  Left abdominals: 5/5  Right iliopsoas: 5/5  Left iliopsoas: 5/5  Right quadriceps: 5/5  Left quadriceps: 5/5  Right hamstrin/5  Left hamstrin/5  Right glutei: 5/5  Left glutei: 5/5  Right anterior tibial: 5/5  Left anterior tibial: 5/5  Right posterior tibial: 5/5  Left posterior tibial: 5/5  Right peroneal: 5/5  Left peroneal: 5/5  Right gastroc: 5/5  Left gastroc: 5/5    Sensory Exam   Light touch normal.   Vibration normal.     Gait, Coordination, and Reflexes     Gait  Gait: normal    Reflexes   Right brachioradialis: 2+  Left brachioradialis: 2+  Right biceps: 2+  Left biceps: 2+  Right triceps: 2+  Left triceps: 2+  Right patellar: 2+  Left patellar: 2+  Right achilles: 2+  Left achilles: 2+  Right : 2+  Left : 2+Station is normal.       Physical Exam  Vitals reviewed.   Constitutional:       Appearance: She is well-developed.   HENT:      Head: Normocephalic and atraumatic.   Eyes:      Extraocular Movements: EOM normal.      Pupils: Pupils are equal, round, and reactive to  light.   Cardiovascular:      Rate and Rhythm: Normal rate and regular rhythm.   Pulmonary:      Breath sounds: Normal breath sounds.   Musculoskeletal:         General: Normal range of motion.   Skin:     General: Skin is warm.   Neurological:      Gait: Gait is intact.      Deep Tendon Reflexes:      Reflex Scores:       Tricep reflexes are 2+ on the right side and 2+ on the left side.       Bicep reflexes are 2+ on the right side and 2+ on the left side.       Brachioradialis reflexes are 2+ on the right side and 2+ on the left side.       Patellar reflexes are 2+ on the right side and 2+ on the left side.       Achilles reflexes are 2+ on the right side and 2+ on the left side.        Procedures    Assessment/Plan: We did talk at great length about how best to evaluate the issues related to memory.  It is not likely that she would be able to get neuropsych testing due to the language barrier.  Therefore it may take a few visits for us to understand if she is experiencing dementia or if this may be a progression of depression and anxiety.  We will continue the topiramate and Zoloft as scheduled.  We will not be able to tell somewhat more at her next visit in 3 to 4 months.  A total of 45 minutes was spent face-to-face with patient today.  Of that greater than 50% of this time was spent discussing signs and symptoms of dementia, depression, anxiety, patient education, plan of care and prognosis.       Diagnoses and all orders for this visit:    1. Migraine without aura and without status migrainosus, not intractable (Primary)    2. Mild recurrent major depression (HCC)    3. Cognitive impairment           Jeremiah Garcia II, MD

## 2022-11-04 DIAGNOSIS — G47.00 INSOMNIA, UNSPECIFIED TYPE: ICD-10-CM

## 2022-11-04 RX ORDER — ALPRAZOLAM 0.25 MG/1
TABLET ORAL
Qty: 90 TABLET | Refills: 1 | Status: SHIPPED | OUTPATIENT
Start: 2022-11-04

## 2023-01-26 DIAGNOSIS — R51.9 CHRONIC DAILY HEADACHE: ICD-10-CM

## 2023-01-26 DIAGNOSIS — G43.009 MIGRAINE WITHOUT AURA AND WITHOUT STATUS MIGRAINOSUS, NOT INTRACTABLE: ICD-10-CM

## 2023-01-27 RX ORDER — TOPIRAMATE 50 MG/1
TABLET, FILM COATED ORAL
Qty: 30 TABLET | Refills: 3 | Status: SHIPPED | OUTPATIENT
Start: 2023-01-27 | End: 2023-04-03

## 2023-02-08 ENCOUNTER — OFFICE VISIT (OUTPATIENT)
Dept: NEUROLOGY | Facility: CLINIC | Age: 88
End: 2023-02-08
Payer: MEDICARE

## 2023-02-08 VITALS
OXYGEN SATURATION: 97 % | WEIGHT: 140 LBS | SYSTOLIC BLOOD PRESSURE: 120 MMHG | HEIGHT: 60 IN | HEART RATE: 81 BPM | DIASTOLIC BLOOD PRESSURE: 74 MMHG | BODY MASS INDEX: 27.48 KG/M2

## 2023-02-08 DIAGNOSIS — G43.009 MIGRAINE WITHOUT AURA AND WITHOUT STATUS MIGRAINOSUS, NOT INTRACTABLE: Primary | ICD-10-CM

## 2023-02-08 PROBLEM — I10 BENIGN ESSENTIAL HYPERTENSION: Status: ACTIVE | Noted: 2017-02-11

## 2023-02-08 PROBLEM — Z95.0 CARDIAC PACEMAKER IN SITU: Status: ACTIVE | Noted: 2021-04-28

## 2023-02-08 PROBLEM — I63.9 ACUTE CVA (CEREBROVASCULAR ACCIDENT): Status: ACTIVE | Noted: 2020-10-12

## 2023-02-08 PROBLEM — R29.90 STROKE-LIKE SYMPTOMS: Status: ACTIVE | Noted: 2020-10-12

## 2023-02-08 PROBLEM — R55 SYNCOPE AND COLLAPSE: Status: ACTIVE | Noted: 2020-10-12

## 2023-02-08 PROBLEM — I48.0 PAF (PAROXYSMAL ATRIAL FIBRILLATION): Status: ACTIVE | Noted: 2021-04-28

## 2023-02-08 PROBLEM — R00.1 SINUS BRADYCARDIA: Status: ACTIVE | Noted: 2017-02-11

## 2023-02-08 PROBLEM — Z79.01 CHRONIC ANTICOAGULATION: Status: ACTIVE | Noted: 2021-04-28

## 2023-02-08 PROCEDURE — 99213 OFFICE O/P EST LOW 20 MIN: CPT | Performed by: PSYCHIATRY & NEUROLOGY

## 2023-02-08 NOTE — PROGRESS NOTES
Chief Complaint   Patient presents with   • Migraine   • Gait Problem       Patient ID: Norma Castillo is a 89 y.o. female.    HPI: I had the pleasure of seeing your patient again today.  As you may know she is a 89-year-old female here for the management of migraine headaches.  She has been doing very well.  She has had no headaches within the last 30 days.  She is still taking Topamax 25 mg at bedtime.  She will typically use an over-the-counter medication as abortive therapy including Tylenol and ibuprofen.  She has not had any issues with cognitive impairment.  She denies any new neuro symptoms.  No new onset focal weakness or numbness of her arms or legs.  No double vision or loss of vision.    The following portions of the patient's history were reviewed and updated as appropriate: allergies, current medications, past family history, past medical history, past social history, past surgical history and problem list.    Review of Systems   Musculoskeletal: Positive for gait problem.   Neurological: Positive for dizziness and headaches. Negative for tremors, seizures, syncope, speech difficulty, weakness, light-headedness and numbness.   Hematological: Does not bruise/bleed easily.   Psychiatric/Behavioral: Negative for agitation, behavioral problems, confusion, decreased concentration, hallucinations, self-injury, sleep disturbance and suicidal ideas. The patient is not nervous/anxious.       I have reviewed the review of systems above performed by my medical assistant.      Vitals:    02/08/23 1513   BP: 120/74   Pulse: 81   SpO2: 97%       Neurologic Exam     Mental Status   Oriented to person, place, and time.   Concentration: normal.   Level of consciousness: alert  Knowledge: consistent with education (No deficits found.).     Cranial Nerves     CN II   Visual fields full to confrontation.     CN III, IV, VI   Pupils are equal, round, and reactive to light.  Extraocular motions are normal.   CN III: no CN III  palsy  CN VI: no CN VI palsy    CN V   Facial sensation intact.     CN VII   Facial expression full, symmetric.     CN VIII   CN VIII normal.     CN IX, X   CN IX normal.   CN X normal.     CN XI   CN XI normal.     CN XII   CN XII normal.     Motor Exam     Strength   Right neck flexion: 5/5  Left neck flexion: 5/5  Right neck extension: 5/5  Left neck extension: 5/5  Right deltoid: 5/5  Left deltoid: 5/5  Right biceps: 5/5  Left biceps: 5/5  Right triceps: 5/5  Left triceps: 5/5  Right wrist flexion: 5/5  Left wrist flexion: 5/5  Right wrist extension: 5/5  Left wrist extension: 5/5  Right interossei: 5/5  Left interossei: 5/5  Right abdominals: 5/5  Left abdominals: 5/5  Right iliopsoas: 5/5  Left iliopsoas: 5/5  Right quadriceps: 5/5  Left quadriceps: 5/5  Right hamstrin/5  Left hamstrin/5  Right glutei: 5/5  Left glutei: 5/5  Right anterior tibial: 5/5  Left anterior tibial: 5/5  Right posterior tibial: 5/5  Left posterior tibial: 5/5  Right peroneal: 5/5  Left peroneal: 5/5  Right gastroc: 5/5  Left gastroc: 5/5    Sensory Exam   Light touch normal.   Vibration normal.     Gait, Coordination, and Reflexes     Gait  Gait: normal    Reflexes   Right brachioradialis: 2+  Left brachioradialis: 2+  Right biceps: 2+  Left biceps: 2+  Right triceps: 2+  Left triceps: 2+  Right patellar: 2+  Left patellar: 2+  Right achilles: 2+  Left achilles: 2+  Right : 2+  Left : 2+Station is normal.       Physical Exam  Vitals reviewed.   Constitutional:       Appearance: She is well-developed.   HENT:      Head: Normocephalic and atraumatic.   Eyes:      Extraocular Movements: EOM normal.      Pupils: Pupils are equal, round, and reactive to light.   Cardiovascular:      Rate and Rhythm: Normal rate and regular rhythm.   Pulmonary:      Breath sounds: Normal breath sounds.   Musculoskeletal:         General: Normal range of motion.   Skin:     General: Skin is warm.   Neurological:      Mental Status: She is  oriented to person, place, and time.      Gait: Gait is intact.      Deep Tendon Reflexes:      Reflex Scores:       Tricep reflexes are 2+ on the right side and 2+ on the left side.       Bicep reflexes are 2+ on the right side and 2+ on the left side.       Brachioradialis reflexes are 2+ on the right side and 2+ on the left side.       Patellar reflexes are 2+ on the right side and 2+ on the left side.       Achilles reflexes are 2+ on the right side and 2+ on the left side.        Procedures    Assessment/Plan: We will start the weaning process of the topiramate at this point.  Weaning instructions have been discussed.  We will see her back in 4 months or sooner if needed.  A total of 25 minutes was spent face-to-face with patient today.  Of that greater than 50% of this time was spent discussing signs and symptoms of migraine headache, patient education, plan of care and prognosis.       Diagnoses and all orders for this visit:    1. Migraine without aura and without status migrainosus, not intractable (Primary)           Jeremiah Garcia II, MD

## 2023-04-03 ENCOUNTER — OFFICE VISIT (OUTPATIENT)
Dept: INTERNAL MEDICINE | Facility: CLINIC | Age: 88
End: 2023-04-03
Payer: MEDICARE

## 2023-04-03 ENCOUNTER — PATIENT ROUNDING (BHMG ONLY) (OUTPATIENT)
Dept: INTERNAL MEDICINE | Facility: CLINIC | Age: 88
End: 2023-04-03
Payer: MEDICARE

## 2023-04-03 VITALS
SYSTOLIC BLOOD PRESSURE: 148 MMHG | HEART RATE: 68 BPM | TEMPERATURE: 97.1 F | OXYGEN SATURATION: 98 % | WEIGHT: 141.4 LBS | HEIGHT: 60 IN | BODY MASS INDEX: 27.76 KG/M2 | DIASTOLIC BLOOD PRESSURE: 62 MMHG

## 2023-04-03 DIAGNOSIS — Z00.00 HEALTHCARE MAINTENANCE: ICD-10-CM

## 2023-04-03 DIAGNOSIS — G47.00 INSOMNIA, UNSPECIFIED TYPE: ICD-10-CM

## 2023-04-03 DIAGNOSIS — I10 BENIGN ESSENTIAL HYPERTENSION: ICD-10-CM

## 2023-04-03 DIAGNOSIS — I48.0 PAF (PAROXYSMAL ATRIAL FIBRILLATION): ICD-10-CM

## 2023-04-03 DIAGNOSIS — K14.6 TONGUE BURNING SENSATION: ICD-10-CM

## 2023-04-03 DIAGNOSIS — F43.21 GRIEF REACTION: ICD-10-CM

## 2023-04-03 DIAGNOSIS — B00.1 HERPES LABIALIS: ICD-10-CM

## 2023-04-03 DIAGNOSIS — Z79.01 CHRONIC ANTICOAGULATION: ICD-10-CM

## 2023-04-03 DIAGNOSIS — F41.8 ANXIETY WITH DEPRESSION: Primary | ICD-10-CM

## 2023-04-03 DIAGNOSIS — I63.9 ACUTE CVA (CEREBROVASCULAR ACCIDENT): ICD-10-CM

## 2023-04-03 DIAGNOSIS — E55.9 VITAMIN D DEFICIENCY: ICD-10-CM

## 2023-04-03 PROBLEM — R29.90 STROKE-LIKE SYMPTOMS: Status: RESOLVED | Noted: 2020-10-12 | Resolved: 2023-04-03

## 2023-04-03 PROBLEM — G43.909 MIGRAINE: Status: ACTIVE | Noted: 2023-04-03

## 2023-04-03 RX ORDER — TRAZODONE HYDROCHLORIDE 50 MG/1
25 TABLET ORAL NIGHTLY
Qty: 30 TABLET | Refills: 2 | Status: SHIPPED | OUTPATIENT
Start: 2023-04-03

## 2023-04-03 RX ORDER — MELATONIN
1000 DAILY
COMMUNITY

## 2023-04-03 RX ORDER — UBIDECARENONE 75 MG
50 CAPSULE ORAL DAILY
COMMUNITY

## 2023-04-03 RX ORDER — VALACYCLOVIR HYDROCHLORIDE 1 G/1
2000 TABLET, FILM COATED ORAL 2 TIMES DAILY
Qty: 30 TABLET | Refills: 2 | Status: SHIPPED | OUTPATIENT
Start: 2023-04-03

## 2023-04-03 NOTE — PROGRESS NOTES
Subjective   Norma Trejo is a 89 y.o. female.     Chief Complaint   Patient presents with   • Insomnia     Pt is here as a new pt to est care. Pt c/o hard time falling a sleep.   • Mouth Lesions     Pt c/o cold sores on mouth.   • Anxiety   • Depression        History of Present Illness   She is here today as a new patient to establish care.  She is accompanied by her daughter today to translate.  Previous PCP [facility] in [place].   She uses a walker to ambulate at home. Her room is on the first floor with handicap accessible bathroom.  She has good lighting at home.  She lives with her daughter.    PAF/cardiac pacemaker- she is currently on metoprolol 25 mg twice daily and Eliquis 2.5 mg twice daily. She has been followed by [facility] at least annually.    Acute CVA- approximately 3 years ago. She is currently on aspirin 81 mg daily along with atorvastatin 20 mg daily.  HTN-she is currently on losartan 100 mg daily, hydrochlorothiazide 12.5 mg daily and metoprolol 25 mg twice daily. Home BP readings running less than 140/90 at home.  She denies any orthostasis or headache.    Migraine headaches-she is followed by neurology, Dr. Garcia.  She was previously prescribed Topamax but has been off this.  She is taking vitamin B12 50 mcg daily.    Depression with anxiety- she is currently on sertraline 100 mg daily with as needed Xanax 0.25 mg nightly.  She has had a difficult time recently secondary to the loss of her .  She notes difficulty falling asleep. She is sleeping on average 3-4 hours a night. She has been taking Xanax 0.25 mg nightly for sleep, but this is not helping any longer.  Sleep disturbance started after the loss of her .  She does not like sleeping alone.  She has been under more stress recently.  She has developed cold sores on the lip. She has had 3 outbreaks since the passing of her .  She also notes that her tongue feels  irritated.    C-scope-she is no longer receiving colon cancer screenings.    GYN-she is no longer receiving mammograms or bone density scans.    The following portions of the patient's history were reviewed and updated as appropriate: allergies, current medications, past family history, past medical history, past social history, past surgical history and problem list.    Review of Systems   Constitutional: Negative for chills, fatigue and fever.   HENT: Positive for postnasal drip. Negative for congestion, ear pain, sinus pressure and sore throat.    Eyes: Positive for itching.   Respiratory: Positive for cough. Negative for chest tightness, shortness of breath and wheezing.    Cardiovascular: Negative for chest pain, palpitations and leg swelling.   Neurological: Negative.    Hematological: Bruises/bleeds easily.   Psychiatric/Behavioral: Positive for sleep disturbance and depressed mood. Negative for suicidal ideas. The patient is nervous/anxious.        Objective   Physical Exam  Constitutional:       Appearance: She is well-developed.   HENT:      Head: Normocephalic and atraumatic.      Right Ear: Tympanic membrane, ear canal and external ear normal. Decreased hearing noted.      Left Ear: Tympanic membrane, ear canal and external ear normal. Decreased hearing noted.      Nose: Rhinorrhea present. Rhinorrhea is clear.      Right Turbinates: Not enlarged.      Left Turbinates: Not enlarged.      Right Sinus: No maxillary sinus tenderness or frontal sinus tenderness.      Left Sinus: No maxillary sinus tenderness or frontal sinus tenderness.      Mouth/Throat:      Lips: Pink.      Mouth: Mucous membranes are moist. No injury or oral lesions.      Dentition: Normal dentition.      Tongue: No lesions. Tongue does not deviate from midline.      Palate: No mass and lesions.      Pharynx: Uvula midline. No pharyngeal swelling, oropharyngeal exudate, posterior oropharyngeal erythema or uvula swelling.      Comments:  Tongue is red with fissures.  Neck:      Thyroid: No thyroid mass, thyromegaly or thyroid tenderness.      Vascular: No carotid bruit.      Trachea: Trachea normal.   Cardiovascular:      Rate and Rhythm: Normal rate and regular rhythm.      Chest Wall: PMI is not displaced.      Pulses:           Radial pulses are 2+ on the right side and 2+ on the left side.        Dorsalis pedis pulses are 2+ on the right side and 2+ on the left side.        Posterior tibial pulses are 2+ on the right side and 2+ on the left side.      Heart sounds: S1 normal and S2 normal.   Pulmonary:      Effort: Pulmonary effort is normal.      Breath sounds: Normal breath sounds.   Musculoskeletal:      Right lower leg: No edema.      Left lower leg: No edema.   Lymphadenopathy:      Head:      Right side of head: No submental, submandibular, tonsillar or occipital adenopathy.      Left side of head: No submental, submandibular, tonsillar or occipital adenopathy.      Cervical: No cervical adenopathy.   Skin:     General: Skin is warm and dry.      Capillary Refill: Capillary refill takes less than 2 seconds.      Findings: Lesion present.      Nails: There is no clubbing.      Comments: Cold sore present on left side of lower lip.   Neurological:      Mental Status: She is alert and oriented to person, place, and time.   Psychiatric:         Attention and Perception: Attention normal.         Mood and Affect: Mood and affect normal.         Speech: Speech normal.         Behavior: Behavior normal.         Thought Content: Thought content normal.         Cognition and Memory: Cognition normal.         Vitals:    04/03/23 1422   BP: 148/62   Pulse: 68   Temp: 97.1 °F (36.2 °C)   SpO2: 98%      Body mass index is 27.62 kg/m².    Assessment & Plan   Diagnoses and all orders for this visit:    1. Anxiety with depression (Primary)  -     CBC & Differential  -     Comprehensive Metabolic Panel  -     TSH Rfx On Abnormal To Free T4  -     Vitamin  B12  -     Folate    2. Acute CVA (cerebrovascular accident)  -     CBC & Differential  -     Comprehensive Metabolic Panel  -     Lipid Panel With LDL / HDL Ratio    3. PAF (paroxysmal atrial fibrillation)  -     Comprehensive Metabolic Panel  -     Lipid Panel With LDL / HDL Ratio    4. Benign essential hypertension  -     Comprehensive Metabolic Panel    5. Chronic anticoagulation  -     CBC & Differential    6. Insomnia, unspecified type  -     CBC & Differential  -     Comprehensive Metabolic Panel  -     TSH Rfx On Abnormal To Free T4  -     Vitamin B12  -     Folate  -     traZODone (DESYREL) 50 MG tablet; Take 0.5 tablets by mouth Every Night.  Dispense: 30 tablet; Refill: 2    7. Grief reaction    8. Herpes labialis  -     CBC & Differential  -     valACYclovir (Valtrex) 1000 MG tablet; Take 2 tablets by mouth 2 (Two) Times a Day. At onset of cold sore.  Dispense: 30 tablet; Refill: 2    9. Tongue burning sensation  -     CBC & Differential  -     Vitamin B12  -     Folate    10. Healthcare maintenance  -     CBC & Differential  -     Comprehensive Metabolic Panel  -     Lipid Panel With LDL / HDL Ratio  -     TSH Rfx On Abnormal To Free T4  -     Vitamin B12  -     Folate    11. Vitamin D deficiency  -     Vitamin D,25-Hydroxy      1.  Anxiety with depression/insomnia- we will try addition of trazodone 25 mg nightly.  Recommend holding Xanax while using the trazodone.  Recommend good sleep hygiene.  Discussed with patient and daughter today that symptoms are likely contributed to the loss of her  and current grief response. Continue sertraline 100 mg daily.  We will follow-up in 4 weeks for medication check.  2.  HTN-check labs today.  Continue medications at current doses.  Notify blood pressure consistently greater than 140/90.  3.  CVA/PAF-check labs today.  Follow-up with cardiology as scheduled annually.  Monitor for abnormal bleeding.  4.  Herpes labialis-start valacyclovir 2000 mg twice daily  for 1 day.  Notify for 4 more outbreaks at which would recommend starting daily suppression therapy with valacyclovir 500 mg.  5.  Tongue burning sensation-check labs today including B12 and folate.    Fasting labs today, will call with results.  Follow-up in 4 weeks for insomnia and Medicare wellness.

## 2023-04-03 NOTE — PROGRESS NOTES
April 3, 2023    Patient rounding obtained at checkout, patient stated she was referred by her daughter and did not have any problems with making her first appointment.  Patient stated her first visit went great and yes she would refer us to friends and family.

## 2023-04-04 LAB
25(OH)D3+25(OH)D2 SERPL-MCNC: 78.7 NG/ML (ref 30–100)
ALBUMIN SERPL-MCNC: 4.1 G/DL (ref 3.5–5.2)
ALBUMIN/GLOB SERPL: 1.9 G/DL
ALP SERPL-CCNC: 55 U/L (ref 39–117)
ALT SERPL-CCNC: 19 U/L (ref 1–33)
AST SERPL-CCNC: 21 U/L (ref 1–32)
BASOPHILS # BLD AUTO: 0.01 10*3/MM3 (ref 0–0.2)
BASOPHILS NFR BLD AUTO: 0.2 % (ref 0–1.5)
BILIRUB SERPL-MCNC: 0.8 MG/DL (ref 0–1.2)
BUN SERPL-MCNC: 23 MG/DL (ref 8–23)
BUN/CREAT SERPL: 21.7 (ref 7–25)
CALCIUM SERPL-MCNC: 9.8 MG/DL (ref 8.2–9.6)
CHLORIDE SERPL-SCNC: 103 MMOL/L (ref 98–107)
CHOLEST SERPL-MCNC: 128 MG/DL (ref 0–200)
CO2 SERPL-SCNC: 27.6 MMOL/L (ref 22–29)
CREAT SERPL-MCNC: 1.06 MG/DL (ref 0.57–1)
EGFRCR SERPLBLD CKD-EPI 2021: 49.1 ML/MIN/1.73
EOSINOPHIL # BLD AUTO: 0.12 10*3/MM3 (ref 0–0.4)
EOSINOPHIL NFR BLD AUTO: 1.9 % (ref 0.3–6.2)
ERYTHROCYTE [DISTWIDTH] IN BLOOD BY AUTOMATED COUNT: 12.2 % (ref 12.3–15.4)
FOLATE SERPL-MCNC: 16.4 NG/ML (ref 4.78–24.2)
GLOBULIN SER CALC-MCNC: 2.2 GM/DL
GLUCOSE SERPL-MCNC: 102 MG/DL (ref 65–99)
HCT VFR BLD AUTO: 35.7 % (ref 34–46.6)
HDLC SERPL-MCNC: 44 MG/DL (ref 40–60)
HGB BLD-MCNC: 12.2 G/DL (ref 12–15.9)
IMM GRANULOCYTES # BLD AUTO: 0.02 10*3/MM3 (ref 0–0.05)
IMM GRANULOCYTES NFR BLD AUTO: 0.3 % (ref 0–0.5)
LDLC SERPL CALC-MCNC: 60 MG/DL (ref 0–100)
LDLC/HDLC SERPL: 1.29 {RATIO}
LYMPHOCYTES # BLD AUTO: 2.28 10*3/MM3 (ref 0.7–3.1)
LYMPHOCYTES NFR BLD AUTO: 36.8 % (ref 19.6–45.3)
MCH RBC QN AUTO: 31.2 PG (ref 26.6–33)
MCHC RBC AUTO-ENTMCNC: 34.2 G/DL (ref 31.5–35.7)
MCV RBC AUTO: 91.3 FL (ref 79–97)
MONOCYTES # BLD AUTO: 0.6 10*3/MM3 (ref 0.1–0.9)
MONOCYTES NFR BLD AUTO: 9.7 % (ref 5–12)
NEUTROPHILS # BLD AUTO: 3.17 10*3/MM3 (ref 1.7–7)
NEUTROPHILS NFR BLD AUTO: 51.1 % (ref 42.7–76)
NRBC BLD AUTO-RTO: 0 /100 WBC (ref 0–0.2)
PLATELET # BLD AUTO: 175 10*3/MM3 (ref 140–450)
POTASSIUM SERPL-SCNC: 4.1 MMOL/L (ref 3.5–5.2)
PROT SERPL-MCNC: 6.3 G/DL (ref 6–8.5)
RBC # BLD AUTO: 3.91 10*6/MM3 (ref 3.77–5.28)
SODIUM SERPL-SCNC: 138 MMOL/L (ref 136–145)
TRIGL SERPL-MCNC: 136 MG/DL (ref 0–150)
TSH SERPL DL<=0.005 MIU/L-ACNC: 1.91 UIU/ML (ref 0.27–4.2)
VIT B12 SERPL-MCNC: >2000 PG/ML (ref 211–946)
VLDLC SERPL CALC-MCNC: 24 MG/DL (ref 5–40)
WBC # BLD AUTO: 6.2 10*3/MM3 (ref 3.4–10.8)

## 2023-04-24 PROBLEM — Z86.73 HISTORY OF CVA (CEREBROVASCULAR ACCIDENT): Status: ACTIVE | Noted: 2020-10-12

## 2023-05-03 ENCOUNTER — TELEPHONE (OUTPATIENT)
Dept: INTERNAL MEDICINE | Facility: CLINIC | Age: 88
End: 2023-05-03
Payer: MEDICARE

## 2023-05-03 NOTE — TELEPHONE ENCOUNTER
Pt's daughter advised to put ice and let us know if it gets bigger otherwise she is on blood thinner and its common to get bruise.

## 2023-05-03 NOTE — TELEPHONE ENCOUNTER
Caller: MIMI LANDAVERDE     Relationship: DAUGHTER    Best call back number:    [phone] (Home)         What is your medical concern? BRUISES ON RIGHT ARM, NO INJURY OR FALL. PATIENT TAKES BLOOD THINNNERS.     How long has this issue been going on? TWO DAYS AGO     Is your provider already aware of this issue? NO     Have you been treated for this issue? NO

## 2023-05-07 DIAGNOSIS — G47.00 INSOMNIA, UNSPECIFIED TYPE: ICD-10-CM

## 2023-05-08 RX ORDER — ALPRAZOLAM 0.25 MG/1
TABLET ORAL
Qty: 90 TABLET | Refills: 1 | Status: SHIPPED | OUTPATIENT
Start: 2023-05-08

## 2023-05-12 ENCOUNTER — OFFICE VISIT (OUTPATIENT)
Dept: INTERNAL MEDICINE | Facility: CLINIC | Age: 88
End: 2023-05-12

## 2023-05-12 VITALS
OXYGEN SATURATION: 97 % | HEART RATE: 62 BPM | DIASTOLIC BLOOD PRESSURE: 70 MMHG | TEMPERATURE: 96.8 F | BODY MASS INDEX: 27.82 KG/M2 | HEIGHT: 60 IN | WEIGHT: 141.7 LBS | SYSTOLIC BLOOD PRESSURE: 138 MMHG

## 2023-05-12 DIAGNOSIS — I10 BENIGN ESSENTIAL HYPERTENSION: Primary | ICD-10-CM

## 2023-05-12 DIAGNOSIS — F41.8 ANXIETY WITH DEPRESSION: ICD-10-CM

## 2023-05-12 DIAGNOSIS — I48.0 PAF (PAROXYSMAL ATRIAL FIBRILLATION): ICD-10-CM

## 2023-05-12 DIAGNOSIS — M25.511 CHRONIC RIGHT SHOULDER PAIN: ICD-10-CM

## 2023-05-12 DIAGNOSIS — F51.01 PRIMARY INSOMNIA: ICD-10-CM

## 2023-05-12 DIAGNOSIS — R79.89 ELEVATED SERUM CREATININE: ICD-10-CM

## 2023-05-12 DIAGNOSIS — Z86.73 HISTORY OF CVA (CEREBROVASCULAR ACCIDENT): ICD-10-CM

## 2023-05-12 DIAGNOSIS — Z79.01 CHRONIC ANTICOAGULATION: ICD-10-CM

## 2023-05-12 DIAGNOSIS — G89.29 CHRONIC RIGHT SHOULDER PAIN: ICD-10-CM

## 2023-05-12 NOTE — PROGRESS NOTES
Subjective   Norma Trejo is a 89 y.o. female.     Chief Complaint   Patient presents with   • Hypertension   • Hyperlipidemia   • Anxiety   • Depression   • Insomnia          • Bleeding/Bruising     Pt c/o rt arm pain and bruised X 2 weeks.        History of Present Illness   She is here today accompanied by her daughter to translate for follow-up.  She has been having worsening right shoulder pain.  History of chronic right shoulder pain for the past 20 years.  Pain is along the top of the shoulder and will occasionally radiate down into her deltoid and forearm.  Pain is worse with reaching and overhead movements.  She denies any right arm weakness or trauma.  She has previously had a steroid injection in the shoulder with improvement to several years ago.  She has been using icy hot 2-3 times a day and doing home exercises with minimal improvement.      HTN- Patient doing well with current medication regimen, compliant with medication schedule, denies adverse effects. Home BP readings running 110-120/60s. She notes occasional orthostasis.  She does note a fall a few weeks ago when she was trying to get on the bus.  History of CVA/A-fib- up-to-date with cardiology at [facility].  She is currently on Eliquis 2.5 mg twice daily and aspirin 81 mg daily.  She notes frequent bruising.  She denies any head trauma.  Anxiety with depression- she is currently taking sertraline 100 mg daily.   Insomnia- she stopped trazodone secondary to side effects of extreme drowsiness. She has returned to taking Xanax 0.25 mg nightly with improvement in sleep.  She denies any side effects with the Xanax.  She is currently receiving Xanax from Dr. Garcia with neurology.    The following portions of the patient's history were reviewed and updated as appropriate: allergies, current medications, past family history, past medical history, past social history, past surgical history and problem list.    Review of Systems   Constitutional:  Positive for fatigue. Negative for chills and fever.   Respiratory: Negative for cough, chest tightness, shortness of breath and wheezing.    Cardiovascular: Negative for chest pain, palpitations and leg swelling.   Musculoskeletal: Positive for arthralgias (right shoulder). Negative for neck pain.   Neurological: Positive for light-headedness (occasional with position changes. ). Negative for weakness and numbness.   Psychiatric/Behavioral: Positive for sleep disturbance (improving). Negative for suicidal ideas and depressed mood. The patient is nervous/anxious.        Objective   Physical Exam  Constitutional:       Appearance: She is well-developed.   Neck:      Thyroid: No thyroid mass, thyromegaly or thyroid tenderness.      Vascular: No carotid bruit.      Trachea: Trachea normal.   Cardiovascular:      Rate and Rhythm: Normal rate and regular rhythm.      Chest Wall: PMI is not displaced.      Pulses:           Radial pulses are 2+ on the right side and 2+ on the left side.        Dorsalis pedis pulses are 2+ on the right side and 2+ on the left side.        Posterior tibial pulses are 2+ on the right side and 2+ on the left side.      Heart sounds: S1 normal and S2 normal.   Pulmonary:      Effort: Pulmonary effort is normal.      Breath sounds: Normal breath sounds.   Musculoskeletal:      Right shoulder: Tenderness present. No swelling, deformity, effusion, laceration, bony tenderness or crepitus. Decreased range of motion. Normal strength. Normal pulse.      Cervical back: Normal.      Right lower leg: No edema.      Left lower leg: No edema.      Comments: Positive painful arc test right shoulder at 90 degrees.  Negative impingement sign.  Negative empty can test.  Bilateral  strength 4 out of 5 symmetric and equal.   Lymphadenopathy:      Head:      Right side of head: No submental, submandibular, tonsillar or occipital adenopathy.      Left side of head: No submental, submandibular, tonsillar or  occipital adenopathy.      Cervical: No cervical adenopathy.   Skin:     General: Skin is warm and dry.      Capillary Refill: Capillary refill takes less than 2 seconds.      Nails: There is no clubbing.   Neurological:      Mental Status: She is alert and oriented to person, place, and time.      Gait: Gait abnormal.      Comments: Needs assistance with position changes and walking.   Psychiatric:         Attention and Perception: Attention normal.         Mood and Affect: Mood and affect normal.         Speech: Speech normal.         Behavior: Behavior normal.         Thought Content: Thought content normal.         Cognition and Memory: Cognition normal.         Vitals:    05/12/23 1416   BP: 138/70   Pulse: 62   Temp: 96.8 °F (36 °C)   SpO2: 97%      Body mass index is 27.67 kg/m².    Assessment & Plan   Diagnoses and all orders for this visit:    1. Benign essential hypertension (Primary)  -     Comprehensive Metabolic Panel; Future  -     Lipid Panel With LDL / HDL Ratio; Future    2. PAF (paroxysmal atrial fibrillation)  -     Comprehensive Metabolic Panel; Future  -     Lipid Panel With LDL / HDL Ratio; Future    3. History of CVA (cerebrovascular accident)  -     Comprehensive Metabolic Panel; Future  -     Lipid Panel With LDL / HDL Ratio; Future    4. Chronic anticoagulation  -     Comprehensive Metabolic Panel; Future  -     Lipid Panel With LDL / HDL Ratio; Future    5. Anxiety with depression    6. Primary insomnia    7. Chronic right shoulder pain  -     Ambulatory Referral to Home Health    8. Elevated serum creatinine  -     Comprehensive Metabolic Panel; Future      1.  HTN-BP has been running low at home and she has symptoms of orthostasis.  Recommend checking blood pressure daily over the next 2 weeks and send me home BP log.  If still consistently low recommend decreasing losartan to 50 mg daily.  Encouraged her to make position changes slowly and hydrate well with fluids.  2.  History of  CVA/PAF/chronic anticoagulation- follow-up with cardiology as scheduled at [facility].  Discussed with her that with her being on aspirin and Eliquis there is a significant increased risk of bruising and bleeding.  Discussed fall risk reduction techniques in the home to prevent injury.  Monitor for worsening symptoms.  To ER with any acute head trauma.  3.  Anxiety with depression/insomnia-stable.  Recommend continuing sertraline 100 mg daily with Xanax 0.25 mg nightly as needed for sleep.  Discussed fall risk with benzodiazepines.  Encouraged relaxation techniques.  4.  Chronic right shoulder pain- recommend referral for home health PT as she cannot drive and has a difficult time ambulating on her own.  We will also send a prescription in for the compound pain cream to try 2-3 times daily.  If symptoms persist despite conservative treatment recommend further evaluation with x-ray right shoulder.  5.  Elevated creatinine-encouraged her to hydrate well with fluids.  Recheck renal function in 6 months.    Recommend follow-up in 6 months with fasting labs prior.

## 2023-05-19 ENCOUNTER — HOME HEALTH ADMISSION (OUTPATIENT)
Dept: HOME HEALTH SERVICES | Facility: HOME HEALTHCARE | Age: 88
End: 2023-05-19
Payer: MEDICARE

## 2023-05-22 ENCOUNTER — TELEPHONE (OUTPATIENT)
Dept: NEUROLOGY | Facility: CLINIC | Age: 88
End: 2023-05-22
Payer: MEDICARE

## 2023-06-09 ENCOUNTER — OFFICE VISIT (OUTPATIENT)
Dept: NEUROLOGY | Facility: CLINIC | Age: 88
End: 2023-06-09
Payer: MEDICARE

## 2023-06-09 VITALS
DIASTOLIC BLOOD PRESSURE: 76 MMHG | WEIGHT: 141 LBS | SYSTOLIC BLOOD PRESSURE: 114 MMHG | HEIGHT: 60 IN | HEART RATE: 74 BPM | OXYGEN SATURATION: 95 % | BODY MASS INDEX: 27.68 KG/M2

## 2023-06-09 DIAGNOSIS — G43.009 MIGRAINE WITHOUT AURA AND WITHOUT STATUS MIGRAINOSUS, NOT INTRACTABLE: Primary | ICD-10-CM

## 2023-06-09 DIAGNOSIS — R51.9 CHRONIC DAILY HEADACHE: ICD-10-CM

## 2023-06-09 PROCEDURE — 1159F MED LIST DOCD IN RCRD: CPT | Performed by: PSYCHIATRY & NEUROLOGY

## 2023-06-09 PROCEDURE — 1160F RVW MEDS BY RX/DR IN RCRD: CPT | Performed by: PSYCHIATRY & NEUROLOGY

## 2023-06-09 PROCEDURE — 99213 OFFICE O/P EST LOW 20 MIN: CPT | Performed by: PSYCHIATRY & NEUROLOGY

## 2023-06-09 RX ORDER — TOPIRAMATE 25 MG/1
25 TABLET ORAL NIGHTLY
Qty: 30 TABLET | Refills: 3 | Status: SHIPPED | OUTPATIENT
Start: 2023-06-09 | End: 2023-07-09

## 2023-06-09 NOTE — PROGRESS NOTES
Chief Complaint   Patient presents with   • Migraine       Patient ID: Norma Trejo is a 89 y.o. female.    HPI: I had the pleasure of seeing your patient again today.  As you may know she is a 89-year-old female here for the management of migraine headaches.  The patient says that she has had more headaches recently.  Over the past 30 days she has averaged approximately 10 to 12 days of headaches.  Typically they occur at night.  She does mention sensitivity to light.  She has not had any focal weakness or numbness of her arms or legs with her headaches.  No double vision or loss of vision.  It is typically a frontal or bitemporal pressure-like sensation.  She had been on topiramate in the past however due to improvement of headache frequency we weaned her off of that.  She will occasionally use naproxen however that has been causing some elevations of her blood pressure.    The following portions of the patient's history were reviewed and updated as appropriate: allergies, current medications, past family history, past medical history, past social history, past surgical history and problem list.    Review of Systems   Constitutional: Negative.    Respiratory: Negative.     Cardiovascular: Negative.    Gastrointestinal: Negative.    Endocrine: Negative.    Genitourinary: Negative.    Musculoskeletal: Negative.    Skin: Negative.    Allergic/Immunologic: Negative.    Neurological:  Positive for headaches.   Hematological: Negative.    Psychiatric/Behavioral:  The patient is nervous/anxious.     I have reviewed the review of systems above performed by my medical assistant.      Vitals:    06/09/23 1558   BP: 114/76   Pulse: 74   SpO2: 95%       Neurologic Exam     Mental Status   Oriented to person, place, and time.   Concentration: normal.   Level of consciousness: alert  Knowledge: consistent with education (No deficits found.).     Cranial Nerves     CN II   Visual fields full to confrontation.     CN III,  IV, VI   Pupils are equal, round, and reactive to light.  Extraocular motions are normal.   CN III: no CN III palsy  CN VI: no CN VI palsy    CN V   Facial sensation intact.     CN VII   Facial expression full, symmetric.     CN VIII   CN VIII normal.     CN IX, X   CN IX normal.   CN X normal.     CN XI   CN XI normal.     CN XII   CN XII normal.     Motor Exam     Strength   Right neck flexion: 5/5  Left neck flexion: 5/5  Right neck extension: 5/5  Left neck extension: 5/5  Right deltoid: 5/5  Left deltoid: 5/5  Right biceps: 5/5  Left biceps: 5/5  Right triceps: 5/5  Left triceps: 5/5  Right wrist flexion: 5/5  Left wrist flexion: 5/5  Right wrist extension: 5/5  Left wrist extension: 5/5  Right interossei: 5/5  Left interossei: 5/5  Right abdominals: 5/5  Left abdominals: 5/5  Right iliopsoas: 5/5  Left iliopsoas: 5/5  Right quadriceps: 5/5  Left quadriceps: 5/5  Right hamstrin/5  Left hamstrin/5  Right glutei: 5/5  Left glutei: 5/5  Right anterior tibial: 5/5  Left anterior tibial: 5/5  Right posterior tibial: 5/5  Left posterior tibial: 5/5  Right peroneal: 5/5  Left peroneal: 5/5  Right gastroc: 5/5  Left gastroc: 5/5    Sensory Exam   Light touch normal.   Vibration normal.     Gait, Coordination, and Reflexes     Gait  Gait: normal    Reflexes   Right brachioradialis: 2+  Left brachioradialis: 2+  Right biceps: 2+  Left biceps: 2+  Right triceps: 2+  Left triceps: 2+  Right patellar: 2+  Left patellar: 2+  Right achilles: 2+  Left achilles: 2+  Right : 2+  Left : 2+Station is normal.       Physical Exam  Vitals reviewed.   Constitutional:       Appearance: She is well-developed.   HENT:      Head: Normocephalic and atraumatic.   Eyes:      Extraocular Movements: EOM normal.      Pupils: Pupils are equal, round, and reactive to light.   Cardiovascular:      Rate and Rhythm: Normal rate and regular rhythm.   Pulmonary:      Breath sounds: Normal breath sounds.   Musculoskeletal:          General: Normal range of motion.   Skin:     General: Skin is warm.   Neurological:      Mental Status: She is oriented to person, place, and time.      Gait: Gait is intact.      Deep Tendon Reflexes:      Reflex Scores:       Tricep reflexes are 2+ on the right side and 2+ on the left side.       Bicep reflexes are 2+ on the right side and 2+ on the left side.       Brachioradialis reflexes are 2+ on the right side and 2+ on the left side.       Patellar reflexes are 2+ on the right side and 2+ on the left side.       Achilles reflexes are 2+ on the right side and 2+ on the left side.        Procedures    Assessment/Plan: We are going to start the topiramate once again since that was helpful for her.  We will try 25 mg nightly for now.  May increase to the 50 mg dosing that she was taking previously if needed.  Tylenol as needed headache onset.  Otherwise we will see her back in 6 months or sooner if needed.  A total of 20 minutes was spent face-to-face with the patient today.  Of that greater than 50% of this time was spent discussing signs and symptoms of migraine headache, patient education, plan of care and prognosis.         Diagnoses and all orders for this visit:    1. Migraine without aura and without status migrainosus, not intractable (Primary)  -     topiramate (TOPAMAX) 25 MG tablet; Take 1 tablet by mouth Every Night for 30 days.  Dispense: 30 tablet; Refill: 3    2. Chronic daily headache  -     topiramate (TOPAMAX) 25 MG tablet; Take 1 tablet by mouth Every Night for 30 days.  Dispense: 30 tablet; Refill: 3           Jeremiah Garcia II, MD

## 2023-07-25 ENCOUNTER — TELEPHONE (OUTPATIENT)
Dept: INTERNAL MEDICINE | Facility: CLINIC | Age: 88
End: 2023-07-25
Payer: MEDICARE

## 2023-07-25 RX ORDER — OLMESARTAN MEDOXOMIL 40 MG/1
40 TABLET ORAL DAILY
Qty: 30 TABLET | Refills: 1 | Status: SHIPPED | OUTPATIENT
Start: 2023-07-25

## 2023-07-25 NOTE — TELEPHONE ENCOUNTER
----- Message from Amy Price sent at 7/25/2023  8:09 AM EDT -----  Theresa daughter called with patients BP readings. She does not think the Losartan is working.    07/23/23 188/98pm  07/24/23 198/96  6pm gave losartan  07/24/23 155/80 9pm  07/25/23155/80 am    Theresa [phone]

## 2023-07-25 NOTE — TELEPHONE ENCOUNTER
HER BP NUMBERS ARE BELOW AND NOW SHE TOOK AGAIN AND IT /89. DEYANIRA TALK TO HER ABT CHANGING HER LOSARTAN TO OLMESARTAN, SINCE HER BP IS VERY HIGH AND I DON'T FEEL COMFORTABLE TO WAIT TILL TOMORROW. WILL YOU ADVISE ON THI PLEASE.

## 2023-07-25 NOTE — TELEPHONE ENCOUNTER
LMTRC   Uses albuterol inhaler as needed. Has had to use very sparingly--one per month at the most.

## 2023-07-25 NOTE — TELEPHONE ENCOUNTER
----- Message from Amy Price sent at 7/25/2023  8:09 AM EDT -----  Theresa daughter called with patients BP readings. She does not think the Losartan is working.    07/23/23 188/98pm  07/24/23 198/96  6pm gave losartan  07/24/23 155/80 9pm  07/25/23155/80 am    Theresa [phone]

## 2023-08-04 ENCOUNTER — HOSPITAL ENCOUNTER (OUTPATIENT)
Dept: GENERAL RADIOLOGY | Facility: HOSPITAL | Age: 88
Discharge: HOME | End: 2023-08-04
Admitting: NURSE PRACTITIONER
Payer: MEDICARE

## 2023-08-04 ENCOUNTER — OFFICE VISIT (OUTPATIENT)
Dept: INTERNAL MEDICINE | Facility: CLINIC | Age: 88
End: 2023-08-04
Payer: MEDICARE

## 2023-08-04 VITALS
OXYGEN SATURATION: 97 % | HEART RATE: 65 BPM | HEIGHT: 60 IN | BODY MASS INDEX: 27.68 KG/M2 | DIASTOLIC BLOOD PRESSURE: 62 MMHG | WEIGHT: 141 LBS | SYSTOLIC BLOOD PRESSURE: 134 MMHG | TEMPERATURE: 98.4 F

## 2023-08-04 DIAGNOSIS — R05.3 PERSISTENT COUGH FOR 3 WEEKS OR LONGER: ICD-10-CM

## 2023-08-04 DIAGNOSIS — J20.9 ACUTE BRONCHITIS, UNSPECIFIED ORGANISM: Primary | ICD-10-CM

## 2023-08-04 DIAGNOSIS — I10 BENIGN ESSENTIAL HYPERTENSION: ICD-10-CM

## 2023-08-04 PROCEDURE — 99214 OFFICE O/P EST MOD 30 MIN: CPT | Performed by: NURSE PRACTITIONER

## 2023-08-04 PROCEDURE — 1159F MED LIST DOCD IN RCRD: CPT | Performed by: NURSE PRACTITIONER

## 2023-08-04 PROCEDURE — 1160F RVW MEDS BY RX/DR IN RCRD: CPT | Performed by: NURSE PRACTITIONER

## 2023-08-04 PROCEDURE — 71046 X-RAY EXAM CHEST 2 VIEWS: CPT

## 2023-08-04 RX ORDER — METOPROLOL TARTRATE 50 MG/1
50 TABLET, FILM COATED ORAL 2 TIMES DAILY
COMMUNITY
Start: 2023-07-17

## 2023-08-04 RX ORDER — VALSARTAN 80 MG/1
80 TABLET ORAL AS NEEDED
COMMUNITY
End: 2023-08-04

## 2023-08-04 RX ORDER — BENZONATATE 200 MG/1
200 CAPSULE ORAL 3 TIMES DAILY PRN
Qty: 21 CAPSULE | Refills: 0 | Status: SHIPPED | OUTPATIENT
Start: 2023-08-04

## 2023-08-04 RX ORDER — DOXYCYCLINE HYCLATE 100 MG/1
100 CAPSULE ORAL 2 TIMES DAILY
Qty: 14 CAPSULE | Refills: 0 | Status: SHIPPED | OUTPATIENT
Start: 2023-08-04 | End: 2023-08-11

## 2023-08-04 RX ORDER — AMLODIPINE BESYLATE 2.5 MG/1
2.5 TABLET ORAL DAILY
Qty: 90 TABLET | Refills: 1 | Status: SHIPPED | OUTPATIENT
Start: 2023-08-04

## 2023-08-07 DIAGNOSIS — I48.0 PAF (PAROXYSMAL ATRIAL FIBRILLATION): ICD-10-CM

## 2023-08-07 DIAGNOSIS — R91.8 ABNORMALITY OF LUNG ON CHEST X-RAY: ICD-10-CM

## 2023-08-07 DIAGNOSIS — J81.0 ACUTE PULMONARY EDEMA: ICD-10-CM

## 2023-08-07 DIAGNOSIS — R05.3 PERSISTENT COUGH FOR 3 WEEKS OR LONGER: Primary | ICD-10-CM

## 2023-08-07 DIAGNOSIS — R06.02 SHORTNESS OF BREATH: ICD-10-CM

## 2023-08-07 DIAGNOSIS — R79.89 ELEVATED SERUM CREATININE: ICD-10-CM

## 2023-08-08 ENCOUNTER — TELEPHONE (OUTPATIENT)
Dept: INTERNAL MEDICINE | Facility: CLINIC | Age: 88
End: 2023-08-08
Payer: MEDICARE

## 2023-08-08 NOTE — TELEPHONE ENCOUNTER
Pt's daughter said pt's BP before medicine today was 190/91 and I advise her to take bp after 1 hr of taking medicine and it came down to 146/64. She take her HCTZ, METOPROLOL AND olmasartan in the morning and metoprolol and amlodipine at night. Daughter want to see if we need to do timing change or may be take one of them in med day.  Please advise.

## 2023-08-16 ENCOUNTER — HOSPITAL ENCOUNTER (OUTPATIENT)
Dept: GENERAL RADIOLOGY | Facility: HOSPITAL | Age: 88
Discharge: HOME | End: 2023-08-16
Admitting: NURSE PRACTITIONER
Payer: MEDICARE

## 2023-08-16 ENCOUNTER — OFFICE VISIT (OUTPATIENT)
Dept: INTERNAL MEDICINE | Facility: CLINIC | Age: 88
End: 2023-08-16
Payer: MEDICARE

## 2023-08-16 VITALS
WEIGHT: 141.3 LBS | HEIGHT: 60 IN | DIASTOLIC BLOOD PRESSURE: 66 MMHG | HEART RATE: 64 BPM | BODY MASS INDEX: 27.74 KG/M2 | TEMPERATURE: 98.2 F | OXYGEN SATURATION: 97 % | SYSTOLIC BLOOD PRESSURE: 114 MMHG

## 2023-08-16 DIAGNOSIS — G89.29 CHRONIC PAIN OF LEFT LOWER EXTREMITY: ICD-10-CM

## 2023-08-16 DIAGNOSIS — M79.605 CHRONIC PAIN OF LEFT LOWER EXTREMITY: ICD-10-CM

## 2023-08-16 DIAGNOSIS — I10 BENIGN ESSENTIAL HYPERTENSION: Primary | ICD-10-CM

## 2023-08-16 DIAGNOSIS — J30.9 ALLERGIC RHINITIS, UNSPECIFIED SEASONALITY, UNSPECIFIED TRIGGER: ICD-10-CM

## 2023-08-16 PROCEDURE — 99214 OFFICE O/P EST MOD 30 MIN: CPT | Performed by: NURSE PRACTITIONER

## 2023-08-16 PROCEDURE — 1159F MED LIST DOCD IN RCRD: CPT | Performed by: NURSE PRACTITIONER

## 2023-08-16 PROCEDURE — 73590 X-RAY EXAM OF LOWER LEG: CPT

## 2023-08-16 PROCEDURE — 1160F RVW MEDS BY RX/DR IN RCRD: CPT | Performed by: NURSE PRACTITIONER

## 2023-08-16 NOTE — PROGRESS NOTES
Subjective   Norma Trejo is a 89 y.o. female.     Chief Complaint   Patient presents with    Hypertension        History of Present Illness   She is here today for f/u on HTN.  She is accompanied by her daughter Theresa today to translate.  After last office visit her blood pressure had been significantly elevated.  Blood pressure medications were adjusted and she is currently taking metoprolol 50 mg and hydrochlorothiazide 12.5 mg in the morning, olmesartan 40 mg in the afternoon and amlodipine 2.5 mg and metoprolol 50 mg in the evening.  She has brought her home blood pressure log in today and blood pressure has been better controlled with systolic readings from 120-140.  She has had a few elevated readings in the evening, but overall her readings are much improved.  She denies any headache, dizziness, chest pain, palpitations, lower extremity edema.  She is due to recheck lab work today.    She is seen significant improvement in her respiratory symptoms since last office visit.  She still notes a lot of postnasal drainage and feeling of phlegm stuck in the throat which is difficult to cough up.  Her sore throat is improving but is still somewhat scratchy.  She has been using Vicks with mild improvement.  She denies any dysphagia.    She also notes chronic left shin pain.  This started last year after she had trauma to the left shin and developed a hematoma.  Since then she notes pain on palpation of the left shin.  She denies any pain with ambulation.  She has been using topical Biofreeze and compound pain cream without improvement.    The following portions of the patient's history were reviewed and updated as appropriate: allergies, current medications, past family history, past medical history, past social history, past surgical history, and problem list.    Review of Systems   Constitutional:  Negative for chills, fatigue and fever.   HENT:  Positive for congestion, postnasal drip and sore throat.  Negative for trouble swallowing.    Respiratory:  Negative for cough, chest tightness, shortness of breath and wheezing.    Musculoskeletal:  Positive for arthralgias (left leg).   Neurological: Negative.      Objective   Physical Exam  Constitutional:       Appearance: She is well-developed.   HENT:      Head: Normocephalic and atraumatic.      Right Ear: Decreased hearing noted.      Left Ear: Decreased hearing noted.      Nose: Rhinorrhea present. Rhinorrhea is clear.      Right Turbinates: Enlarged.      Left Turbinates: Enlarged.      Mouth/Throat:      Lips: Pink.      Mouth: Mucous membranes are moist. No injury or oral lesions.      Dentition: Normal dentition.      Tongue: No lesions. Tongue does not deviate from midline.      Palate: No mass and lesions.      Pharynx: Uvula midline. No pharyngeal swelling, oropharyngeal exudate, posterior oropharyngeal erythema or uvula swelling.      Comments: Posterior pharynx with clear drainage.  Neck:      Thyroid: No thyroid mass, thyromegaly or thyroid tenderness.      Vascular: No carotid bruit.      Trachea: Trachea normal.   Cardiovascular:      Rate and Rhythm: Normal rate and regular rhythm.      Chest Wall: PMI is not displaced.      Pulses:           Radial pulses are 2+ on the right side and 2+ on the left side.        Dorsalis pedis pulses are 2+ on the right side and 2+ on the left side.        Posterior tibial pulses are 2+ on the right side and 2+ on the left side.      Heart sounds: S1 normal and S2 normal.   Pulmonary:      Effort: Pulmonary effort is normal.      Breath sounds: Normal breath sounds.   Musculoskeletal:      Right lower leg: No edema.      Left lower leg: Bony tenderness (along shin) present. No deformity or lacerations. No edema.        Legs:    Lymphadenopathy:      Head:      Right side of head: No submental, submandibular, tonsillar or occipital adenopathy.      Left side of head: No submental, submandibular, tonsillar or occipital  adenopathy.      Cervical: No cervical adenopathy.   Skin:     General: Skin is warm and dry.      Capillary Refill: Capillary refill takes less than 2 seconds.      Nails: There is no clubbing.   Neurological:      Mental Status: She is alert and oriented to person, place, and time.   Psychiatric:         Attention and Perception: Attention normal.         Mood and Affect: Mood and affect normal.         Speech: Speech normal.         Behavior: Behavior normal.         Thought Content: Thought content normal.         Cognition and Memory: Cognition normal.       Vitals:    08/16/23 1511   BP: 114/66   Pulse: 64   Temp: 98.2 øF (36.8 øC)   SpO2: 97%      Body mass index is 27.6 kg/mý.    Assessment & Plan   Problems Addressed this Visit          Cardiac and Vasculature    Benign essential hypertension - Primary     Other Visit Diagnoses       Chronic pain of left lower extremity        Relevant Orders    XR tibia fibula 2 vw left    Allergic rhinitis, unspecified seasonality, unspecified trigger              Diagnoses         Codes Comments    Benign essential hypertension    -  Primary ICD-10-CM: I10  ICD-9-CM: 401.1     Chronic pain of left lower extremity     ICD-10-CM: M79.605, G89.29  ICD-9-CM: 729.5, 338.29     Allergic rhinitis, unspecified seasonality, unspecified trigger     ICD-10-CM: J30.9  ICD-9-CM: 477.9           1.  HTN-much improved.  Continue medications at current doses.  Continue to monitor blood pressure twice daily and notify if persistently elevated greater than 140/90.  If blood pressure is consistently above goal we will increase amlodipine to 5 mg daily.  2.  Chronic pain of left lower extremity-check x-ray tibia-fibula today secondary to history of trauma and persistent pain.  3.  AR-start Nasacort 1 to 2 puffs in each nostril daily along with plain Mucinex as needed.  If symptoms persist recommend referral to ENT and allergy.    Check labs today including BMP and proBNP.  Will be due for  CT chest in 1 month for follow-up on pulmonary edema, persistent cough and shortness of breath.

## 2023-08-24 ENCOUNTER — TELEPHONE (OUTPATIENT)
Dept: INTERNAL MEDICINE | Facility: CLINIC | Age: 88
End: 2023-08-24
Payer: MEDICARE

## 2023-08-24 DIAGNOSIS — R05.3 PERSISTENT COUGH FOR 3 WEEKS OR LONGER: Primary | ICD-10-CM

## 2023-08-24 RX ORDER — ALBUTEROL SULFATE 90 UG/1
2 AEROSOL, METERED RESPIRATORY (INHALATION) EVERY 4 HOURS PRN
Qty: 8 G | Refills: 1 | Status: SHIPPED | OUTPATIENT
Start: 2023-08-24

## 2023-08-24 RX ORDER — BENZONATATE 200 MG/1
200 CAPSULE ORAL 3 TIMES DAILY PRN
Qty: 30 CAPSULE | Refills: 0 | Status: SHIPPED | OUTPATIENT
Start: 2023-08-24

## 2023-08-24 NOTE — TELEPHONE ENCOUNTER
Pt's daughter said pt's dry cough is back, no wheezing but really coughing like she did when she had pneumonia, daughter is giving her delsym but it is not helping. Please advise..

## 2023-08-24 NOTE — TELEPHONE ENCOUNTER
PATIENTS DAUGHTER IS CALLING SHE IS CONCERNED ABOUT THE PATIENT HAVING A SEVERE COUGH. STATES LAST TIME SHE WAS COUGHING LIKE THIS IT WAS DUE TO PNUMONIA PLEASE ADVISE. THANK YOU

## 2023-08-28 ENCOUNTER — OFFICE VISIT (OUTPATIENT)
Dept: INTERNAL MEDICINE | Facility: CLINIC | Age: 88
End: 2023-08-28
Payer: MEDICARE

## 2023-08-28 VITALS
DIASTOLIC BLOOD PRESSURE: 68 MMHG | TEMPERATURE: 98.1 F | HEART RATE: 78 BPM | OXYGEN SATURATION: 95 % | SYSTOLIC BLOOD PRESSURE: 144 MMHG | HEIGHT: 60 IN | BODY MASS INDEX: 27.82 KG/M2 | WEIGHT: 141.7 LBS

## 2023-08-28 DIAGNOSIS — R05.9 COUGH, UNSPECIFIED TYPE: ICD-10-CM

## 2023-08-28 DIAGNOSIS — J02.9 PHARYNGITIS, UNSPECIFIED ETIOLOGY: Primary | ICD-10-CM

## 2023-08-28 DIAGNOSIS — R10.31 RLQ ABDOMINAL PAIN: ICD-10-CM

## 2023-08-28 LAB
BILIRUB BLD-MCNC: NEGATIVE MG/DL
CLARITY, POC: CLEAR
COLOR UR: YELLOW
EXPIRATION DATE: ABNORMAL
EXPIRATION DATE: NORMAL
EXPIRATION DATE: NORMAL
FLUAV AG UPPER RESP QL IA.RAPID: NOT DETECTED
FLUBV AG UPPER RESP QL IA.RAPID: NOT DETECTED
GLUCOSE UR STRIP-MCNC: NEGATIVE MG/DL
INTERNAL CONTROL: NORMAL
INTERNAL CONTROL: NORMAL
KETONES UR QL: NEGATIVE
LEUKOCYTE EST, POC: ABNORMAL
Lab: ABNORMAL
Lab: NORMAL
Lab: NORMAL
NITRITE UR-MCNC: NEGATIVE MG/ML
PH UR: 5.5 [PH] (ref 5–8)
PROT UR STRIP-MCNC: NEGATIVE MG/DL
RBC # UR STRIP: ABNORMAL /UL
S PYO AG THROAT QL: NEGATIVE
SARS-COV-2 AG UPPER RESP QL IA.RAPID: NOT DETECTED
SP GR UR: 1.01 (ref 1–1.03)
UROBILINOGEN UR QL: NORMAL

## 2023-08-28 PROCEDURE — 1159F MED LIST DOCD IN RCRD: CPT | Performed by: NURSE PRACTITIONER

## 2023-08-28 PROCEDURE — 1160F RVW MEDS BY RX/DR IN RCRD: CPT | Performed by: NURSE PRACTITIONER

## 2023-08-28 PROCEDURE — 87880 STREP A ASSAY W/OPTIC: CPT | Performed by: NURSE PRACTITIONER

## 2023-08-28 PROCEDURE — 81003 URINALYSIS AUTO W/O SCOPE: CPT | Performed by: NURSE PRACTITIONER

## 2023-08-28 PROCEDURE — 99213 OFFICE O/P EST LOW 20 MIN: CPT | Performed by: NURSE PRACTITIONER

## 2023-08-28 PROCEDURE — 87428 SARSCOV & INF VIR A&B AG IA: CPT | Performed by: NURSE PRACTITIONER

## 2023-08-28 RX ORDER — LIDOCAINE HYDROCHLORIDE 20 MG/ML
5 SOLUTION OROPHARYNGEAL 3 TIMES DAILY PRN
Qty: 100 ML | Refills: 0 | Status: SHIPPED | OUTPATIENT
Start: 2023-08-28

## 2023-08-28 RX ORDER — GUAIFENESIN 600 MG/1
1200 TABLET, EXTENDED RELEASE ORAL 2 TIMES DAILY
COMMUNITY

## 2023-08-28 RX ORDER — VALACYCLOVIR HYDROCHLORIDE 1 G/1
1000 TABLET, FILM COATED ORAL 2 TIMES DAILY
Qty: 14 TABLET | Refills: 0 | Status: SHIPPED | OUTPATIENT
Start: 2023-08-28 | End: 2023-09-04

## 2023-08-28 NOTE — PROGRESS NOTES
Subjective   Norma Trejo is a 89 y.o. female.     Chief Complaint   Patient presents with    Sore Throat     Pt c/o sore throat, red blisters in throat and cough and hurts to swallow X 1 week.    Cough    Abdominal Pain     Pt c/o rt side abdominal pain X yesterday.          History of Present Illness   She is here today with c/o sore throat, blisters in the throat and cough. Symptoms started 1 week ago. Her throat is severely painful with coughing, pain is described as sharp. She notes blisters along the inside of the mouth and back of the throat. Her cough has been occasionally productive of clear sputum.   She has been using tessalon and albuterol with some improvement in cough. She has been using topical lidocaine in her mouth to help with the pain. She is taking plain mucinex and using a nasal steroid spray.  She denies any fever or chills, SOB, wheezing, chest pain.  She denies any rhinorrhea or postnasal drainage. She denies any acid reflux.   Positive hx of PNA at the beginning of August.  Radiology recommended repeat imaging with CT chest in 6 weeks.    She also notes intermittent right side abdominal pain for months.  Pain most recently started yesterday. Pain is intermittent in the RLQ. Pain does not radiate.  She notes pain is typically worse with spicy and acidic foods.  She denies any constipation, diarrhea, BRBPR, acid reflux, urinary symptoms.     The following portions of the patient's history were reviewed and updated as appropriate: allergies, current medications, past family history, past medical history, past social history, past surgical history, and problem list.    Review of Systems   Constitutional:  Negative for chills, fatigue and fever.   HENT:  Positive for sore throat and trouble swallowing. Negative for congestion, ear pain, postnasal drip and sinus pressure.    Respiratory:  Positive for cough. Negative for chest tightness, shortness of breath and wheezing.    Cardiovascular:   Negative for chest pain, palpitations and leg swelling.   Gastrointestinal:  Positive for abdominal pain. Negative for abdominal distention, anal bleeding, blood in stool, constipation, diarrhea, nausea, vomiting and GERD.   Neurological:  Negative for headache.     Objective   Physical Exam  Constitutional:       General: She is awake.      Appearance: She is well-developed.   HENT:      Head: Normocephalic and atraumatic.      Right Ear: Tympanic membrane, ear canal and external ear normal. Decreased hearing noted.      Left Ear: Tympanic membrane, ear canal and external ear normal. Decreased hearing noted.      Nose: Nose normal.      Right Turbinates: Not enlarged.      Left Turbinates: Not enlarged.      Right Sinus: No maxillary sinus tenderness or frontal sinus tenderness.      Left Sinus: No maxillary sinus tenderness or frontal sinus tenderness.      Mouth/Throat:      Lips: Pink.      Mouth: Mucous membranes are moist. Oral lesions present. No injury.      Dentition: Has dentures.      Tongue: No lesions. Tongue does not deviate from midline.      Palate: Lesions present. No mass.      Pharynx: Uvula midline. Posterior oropharyngeal erythema present. No pharyngeal swelling, oropharyngeal exudate or uvula swelling.     Neck:      Thyroid: No thyroid mass, thyromegaly or thyroid tenderness.      Vascular: No carotid bruit.      Trachea: Trachea normal.   Cardiovascular:      Rate and Rhythm: Normal rate and regular rhythm.      Chest Wall: PMI is not displaced.      Pulses:           Radial pulses are 2+ on the right side and 2+ on the left side.        Dorsalis pedis pulses are 2+ on the right side and 2+ on the left side.        Posterior tibial pulses are 2+ on the right side and 2+ on the left side.      Heart sounds: S1 normal and S2 normal.   Pulmonary:      Effort: Pulmonary effort is normal.      Breath sounds: Normal breath sounds. No decreased breath sounds, wheezing, rhonchi or rales.   Abdominal:       General: Bowel sounds are normal. There is no distension or abdominal bruit. There are no signs of injury.      Palpations: Abdomen is soft. There is no hepatomegaly or splenomegaly.      Tenderness: There is abdominal tenderness in the right lower quadrant. There is no guarding or rebound.      Hernia: No hernia is present.   Musculoskeletal:      Right lower leg: No edema.      Left lower leg: No edema.   Lymphadenopathy:      Head:      Right side of head: No submental, submandibular, tonsillar or occipital adenopathy.      Left side of head: No submental, submandibular, tonsillar or occipital adenopathy.      Cervical: No cervical adenopathy.   Skin:     General: Skin is warm and dry.      Capillary Refill: Capillary refill takes less than 2 seconds.      Nails: There is no clubbing.   Neurological:      Mental Status: She is alert and oriented to person, place, and time.   Psychiatric:         Attention and Perception: Attention normal.         Mood and Affect: Mood and affect normal.         Speech: Speech normal.         Behavior: Behavior normal. Behavior is cooperative.         Thought Content: Thought content normal.         Cognition and Memory: Cognition normal.       Vitals:    08/28/23 1444   BP: 144/68   Pulse: 78   Temp: 98.1 øF (36.7 øC)   SpO2: 95%      Body mass index is 27.67 kg/mý.    Assessment & Plan   Problems Addressed this Visit    None  Visit Diagnoses       Pharyngitis, unspecified etiology    -  Primary    Relevant Medications    valACYclovir (Valtrex) 1000 MG tablet    Lidocaine Viscous HCl (XYLOCAINE) 2 % solution    Other Relevant Orders    POCT urinalysis dipstick, automated (Completed)    POCT rapid strep A (Completed)    POCT SARS-CoV-2 Antigen MATIAS + Flu (Completed)    Culture, Routine - Swab, Oropharynx    Cough, unspecified type        Relevant Orders    POCT urinalysis dipstick, automated (Completed)    POCT rapid strep A (Completed)    POCT SARS-CoV-2 Antigen MATIAS + Flu  (Completed)    Culture, Routine - Swab, Oropharynx    RLQ abdominal pain        Relevant Orders    POCT urinalysis dipstick, automated (Completed)    POCT rapid strep A (Completed)    POCT SARS-CoV-2 Antigen MATIAS + Flu (Completed)    Urine Culture - Urine, Urine, Clean Catch    Ambulatory Referral to Gastroenterology          Diagnoses         Codes Comments    Pharyngitis, unspecified etiology    -  Primary ICD-10-CM: J02.9  ICD-9-CM: 462     Cough, unspecified type     ICD-10-CM: R05.9  ICD-9-CM: 786.2     RLQ abdominal pain     ICD-10-CM: R10.31  ICD-9-CM: 789.03           1.  Pharyngitis-COVID, flu and strep test negative today in office.  Throat culture obtained for further evaluation.  She does have the presence of several blisterlike lesions on the back of the throat with concern for possible viral infection.  We will start valacyclovir 1000 mg twice daily x1 week along with viscous lidocaine 2% swish and spit 3 times daily as needed.  Recommend cool fluids.  If symptoms persist despite treatment recommend referral to ENT.  2.  Cough-improving some.  Okay to continue Tessalon and albuterol inhaler.  Check CT chest as scheduled.  3.  RLQ abdominal pain-she is mildly tender on exam today.  This has been intermittent for several months.  Recommend avoiding trigger foods and referral to GI for further evaluation.  Referral placed.

## 2023-08-30 LAB
BACTERIA UR CULT: NO GROWTH
BACTERIA UR CULT: NORMAL

## 2023-08-31 DIAGNOSIS — J02.9 PHARYNGITIS, UNSPECIFIED ETIOLOGY: Primary | ICD-10-CM

## 2023-08-31 LAB
BACTERIA SPEC RESP CULT: NORMAL
BACTERIA SPEC RESP CULT: NORMAL

## 2023-09-12 ENCOUNTER — OFFICE VISIT (OUTPATIENT)
Dept: GASTROENTEROLOGY | Facility: CLINIC | Age: 88
End: 2023-09-12
Payer: MEDICARE

## 2023-09-12 VITALS
OXYGEN SATURATION: 95 % | TEMPERATURE: 96.4 F | DIASTOLIC BLOOD PRESSURE: 70 MMHG | WEIGHT: 141.9 LBS | HEART RATE: 103 BPM | HEIGHT: 60 IN | BODY MASS INDEX: 27.86 KG/M2 | SYSTOLIC BLOOD PRESSURE: 110 MMHG

## 2023-09-12 DIAGNOSIS — R10.31 RIGHT LOWER QUADRANT ABDOMINAL PAIN: ICD-10-CM

## 2023-09-12 DIAGNOSIS — R05.3 CHRONIC COUGH: Primary | ICD-10-CM

## 2023-09-12 RX ORDER — PANTOPRAZOLE SODIUM 40 MG/1
40 TABLET, DELAYED RELEASE ORAL DAILY
Qty: 90 TABLET | Refills: 3 | Status: SHIPPED | OUTPATIENT
Start: 2023-09-12

## 2023-09-12 NOTE — PROGRESS NOTES
"Chief Complaint   Patient presents with    Nocturnal Cough    Abdominal Pain           History of Present Illness    Patient is a 89 y.o. who presents to the office as a new patient for further evaluation of intermittent right-sided abdominal pain times months as well as chronic nighttime cough after referral received from primary care provider ELSA Brewer.  patient has a significant past medical history of hypertension, CVA, A-fib-on anticoagulation, and pneumonia.    She denies current antireflux regimen at this time, persistent dry cough is described as worse at nighttime.  Denies heartburn symptoms, nausea, vomiting, or dysphagia.  States that evening meal is greater than 2 to 3 hours of going to bed and has not been able to identify triggers to symptoms.    No prior EGD evaluation.    Additionally, patient reports persistent and intermittent right lower quadrant abdominal pain times months described as a dull nonspecific discomfort that is nonradiating in nature.  Bowel habits are described as occurring once daily without urgency or pain.  Reports that pain will gradually resolve without further intervention.  Denies melena or hematochezia.  Denies unintentional weight loss.    Patient denies known family history of colon polyps, colon cancer, or IBD.       Result Review :       Office Visit with Sharon Parry APRN (08/28/2023)   Comprehensive Metabolic Panel (04/03/2023 15:17)  CBC & Differential (04/03/2023 15:17)    Vital Signs:   /70   Pulse 103   Temp 96.4 °F (35.8 °C)   Ht 152.4 cm (60\")   Wt 64.4 kg (141 lb 14.4 oz)   SpO2 95%   BMI 27.71 kg/m²     Body mass index is 27.71 kg/m².     Physical Exam  Vitals reviewed.   Constitutional:       General: She is not in acute distress.     Appearance: Normal appearance. She is not ill-appearing.   Eyes:      General: No scleral icterus.  Pulmonary:      Effort: Pulmonary effort is normal. No respiratory distress.   Abdominal:      " General: Abdomen is flat. Bowel sounds are normal. There is no distension.      Palpations: Abdomen is soft. Abdomen is not rigid. There is no mass or pulsatile mass.      Tenderness: There is no abdominal tenderness. There is no guarding or rebound.      Hernia: No hernia is present.   Skin:     Coloration: Skin is not jaundiced.   Neurological:      Mental Status: She is alert and oriented to person, place, and time.   Psychiatric:         Thought Content: Thought content normal.         Judgment: Judgment normal.         Assessment and Plan    Diagnoses and all orders for this visit:    1. Chronic cough (Primary)  -     FL Esophagram Complete Double-Contrast; Future  -     pantoprazole (PROTONIX) 40 MG EC tablet; Take 1 tablet by mouth Daily.  Dispense: 90 tablet; Refill: 3    2. Right lower quadrant abdominal pain           Discussion:      Patient is a pleasant 89-year-old female who presents with her family member.   service was utilized to complete an office visit.  Lengthy discussion completed with both family and patient on importance of avoiding endoscopic evaluation if possible secondary to her complex comorbidities that would place her at increased risk from complications associated with anesthesia.      They are in agreement to proceed with a conservative plan that entails once daily pantoprazole 40 mg prior to breakfast for 8 weeks and obtaining esophagram imaging that would allow for assessment of an underlying motility change and reflux in recumbent position.  If symptoms do not improve with this plan or concerning findings are noted on planned esophagram would recommend proceeding with EGD at that time to further assess.    For right lower quadrant abdominal pain, would recommend starting daily fiber supplementation to ensure complete soft formed bowel movements to help assess if constipation is contributing to right lower quadrant pain.  If pain is exacerbated or is not alleviated with  this plan recommend proceeding with CT imaging of the abdomen pelvis to further assess for underlying cause or could consider performing colonoscopy evaluation at time of EGD if EGD is required.    Patient is agreeable to the outlined above treatment plan.  Verbalizes understanding and will contact office for any new or worsening concerns.  All questions answered and support provided.        Patient Instructions   Scheduling of your Ordered Diagnostic Tests :    A team member from [facility] will contact you within the next 3-5 business days to schedule your tests.  If you have not heard them within this time frame, they can be contacted at [phone]    For GERD:    Follow antireflux precautions:    Begin taking pantoprazole 40 mg once daily prior to breakfast x 8 weeks   Avoiding eating within 3 to 4 hours of bedtime.    Avoid foods that can trigger symptoms which may include:  citrus fruits  spicy foods,  Tomatoes  Red sauces   Chocolate  coffee/tea  caffeinated or carbonated beverages  Alcohol    For Constipation:    Goal of constipation regimen is to produce at least 3 complete bowel movements per week   Recommend starting a soluble fiber regimen that includes psyllium such as Metamucil.  This helps to keep stool soft and formed and easy transit throughout the colon to produce regular and complete bowel movements.  Consume at least 20 to 30 g of dietary fiber per day  Research has proven that consuming 5 prunes or 2 kiwi fruit per day can also aid in reducing constipation.  When starting fiber regimen recommend starting with a low-dose and gradually increasing by 1 tablespoon every 7 days as tolerated.  This will help your body acclimate to the higher fiber diet and reduce risk of unwanted side effects that include bloating, gas, abdominal fullness, and cramping  For example: Begin taking 1 tablespoon daily for at least 7 days, if this is not successful in producing regular bowel movements  can begin taking 1 tablespoons twice a day, and finally if this is not successful after taking 2 tablespoons for 7 days, can further increase to maximum recommended dosing of 1 tablespoon 3 times per day.  It is important to consume increased amounts of fluid throughout the day to help improve the effectiveness of the fiber supplementation  Avoid gummy formulations of fiber as this can further increase your risk of the above adverse effects due to artificial sweeteners in the Gummies.         EMR Dragon/Transcription Disclaimer:  This document has been Dictated utilizing Dragon dictation.

## 2023-09-12 NOTE — PATIENT INSTRUCTIONS
Scheduling of your Ordered Diagnostic Tests :    A team member from [facility] will contact you within the next 3-5 business days to schedule your tests.  If you have not heard them within this time frame, they can be contacted at [phone]    For GERD:    Follow antireflux precautions:    Begin taking pantoprazole 40 mg once daily prior to breakfast x 8 weeks   Avoiding eating within 3 to 4 hours of bedtime.    Avoid foods that can trigger symptoms which may include:  citrus fruits  spicy foods,  Tomatoes  Red sauces   Chocolate  coffee/tea  caffeinated or carbonated beverages  Alcohol    For Constipation:    Goal of constipation regimen is to produce at least 3 complete bowel movements per week   Recommend starting a soluble fiber regimen that includes psyllium such as Metamucil.  This helps to keep stool soft and formed and easy transit throughout the colon to produce regular and complete bowel movements.  Consume at least 20 to 30 g of dietary fiber per day  Research has proven that consuming 5 prunes or 2 kiwi fruit per day can also aid in reducing constipation.  When starting fiber regimen recommend starting with a low-dose and gradually increasing by 1 tablespoon every 7 days as tolerated.  This will help your body acclimate to the higher fiber diet and reduce risk of unwanted side effects that include bloating, gas, abdominal fullness, and cramping  For example: Begin taking 1 tablespoon daily for at least 7 days, if this is not successful in producing regular bowel movements can begin taking 1 tablespoons twice a day, and finally if this is not successful after taking 2 tablespoons for 7 days, can further increase to maximum recommended dosing of 1 tablespoon 3 times per day.  It is important to consume increased amounts of fluid throughout the day to help improve the effectiveness of the fiber supplementation  Avoid gummy formulations of fiber as this can further increase your risk of  the above adverse effects due to artificial sweeteners in the Gummies.

## 2023-09-15 ENCOUNTER — PATIENT ROUNDING (BHMG ONLY) (OUTPATIENT)
Dept: GASTROENTEROLOGY | Facility: CLINIC | Age: 88
End: 2023-09-15
Payer: MEDICARE

## 2023-09-19 RX ORDER — OLMESARTAN MEDOXOMIL 40 MG/1
40 TABLET ORAL DAILY
Qty: 30 TABLET | Refills: 1 | Status: SHIPPED | OUTPATIENT
Start: 2023-09-19

## 2023-09-25 ENCOUNTER — HOSPITAL ENCOUNTER (OUTPATIENT)
Dept: CT IMAGING | Facility: HOSPITAL | Age: 88
Discharge: HOME | End: 2023-09-25
Admitting: NURSE PRACTITIONER
Payer: MEDICARE

## 2023-09-25 DIAGNOSIS — R05.3 PERSISTENT COUGH FOR 3 WEEKS OR LONGER: ICD-10-CM

## 2023-09-25 DIAGNOSIS — R06.02 SHORTNESS OF BREATH: ICD-10-CM

## 2023-09-25 DIAGNOSIS — R91.8 ABNORMALITY OF LUNG ON CHEST X-RAY: ICD-10-CM

## 2023-09-25 DIAGNOSIS — J81.0 ACUTE PULMONARY EDEMA: ICD-10-CM

## 2023-09-25 PROCEDURE — 25510000001 IOPAMIDOL 61 % SOLUTION: Performed by: NURSE PRACTITIONER

## 2023-09-25 PROCEDURE — 71260 CT THORAX DX C+: CPT

## 2023-09-25 PROCEDURE — 82565 ASSAY OF CREATININE: CPT

## 2023-09-25 RX ADMIN — IOPAMIDOL 75 ML: 612 INJECTION, SOLUTION INTRAVENOUS at 17:08

## 2023-09-26 LAB — CREAT BLDA-MCNC: 1.1 MG/DL (ref 0.6–1.3)

## 2023-09-28 DIAGNOSIS — R05.3 CHRONIC COUGH: ICD-10-CM

## 2023-09-28 DIAGNOSIS — R91.8 GROUND GLASS OPACITY PRESENT ON IMAGING OF LUNG: Primary | ICD-10-CM

## 2023-09-28 DIAGNOSIS — R91.8 PULMONARY NODULES: ICD-10-CM

## 2023-10-05 ENCOUNTER — OFFICE VISIT (OUTPATIENT)
Dept: OTHER | Facility: HOSPITAL | Age: 88
End: 2023-10-05
Payer: MEDICARE

## 2023-10-05 VITALS
BODY MASS INDEX: 27.68 KG/M2 | WEIGHT: 141 LBS | HEART RATE: 84 BPM | SYSTOLIC BLOOD PRESSURE: 129 MMHG | OXYGEN SATURATION: 95 % | TEMPERATURE: 96.6 F | HEIGHT: 60 IN | DIASTOLIC BLOOD PRESSURE: 68 MMHG

## 2023-10-05 DIAGNOSIS — R91.8 GROUND GLASS OPACITY PRESENT ON IMAGING OF LUNG: Primary | ICD-10-CM

## 2023-10-06 NOTE — PROGRESS NOTES
[facility]   Consult    Chief Complaint  Indeterminate GGOs and small pulmonary nodules      Diagnosis: Indeterminate GGOs and small pulmonary nodules      HPI:    Norma Trejo is a 89 y.o. female with a past medical history of CVA, pacemaker, hypertension who underwent CT chest on 9/25/2023 for persistent dry cough and right upper extremity pain.  CT imaging demonstrated bilateral GGO's and small pulmonary nodules.  The patient was referred to multidisciplinary thoracic clinic for further work-up and management.      Imaging:      CT Chest 9/25/2023    IMPRESSION:  Multiple groundglass lung opacities as described above are indeterminate  in etiology and chronicity. They may be infectious or inflammatory.  Short interval follow-up chest CT is recommended in 3-6 months to  evaluate for clearing/stability    Pathology:    No new relevant patholgoy      Labs:    Lab Results   Component Value Date    CREATININE 1.10 09/25/2023             Problem List:  Patient Active Problem List   Diagnosis    History of CVA (cerebrovascular accident)    Benign essential hypertension    Cardiac pacemaker in situ    Chronic anticoagulation    Hypertensive encephalopathy    PAF (paroxysmal atrial fibrillation)    Sinus bradycardia    Syncope and collapse    Vitamin D deficiency    Herpes labialis    Migraine    Anxiety with depression    Primary insomnia          Medications:  Current Outpatient Medications on File Prior to Visit   Medication Sig Dispense Refill    albuterol sulfate  (90 Base) MCG/ACT inhaler Inhale 2 puffs Every 4 (Four) Hours As Needed for Wheezing. 8 g 1    amLODIPine (NORVASC) 2.5 MG tablet Take 1 tablet by mouth Daily. 90 tablet 1    apixaban (ELIQUIS) 2.5 MG tablet tablet Take 1 tablet by mouth 2 (Two) Times a Day.      aspirin 81 MG EC tablet Daily.      atorvastatin (LIPITOR) 20 MG tablet Take 1 tablet by mouth Daily.      benzonatate (TESSALON) 200 MG capsule Take 1 capsule by  "mouth 3 (Three) Times a Day As Needed for Cough. 30 capsule 0    cholecalciferol (VITAMIN D3) 25 MCG (1000 UT) tablet Take 1 tablet by mouth Daily.      guaiFENesin (MUCINEX) 600 MG 12 hr tablet Take 2 tablets by mouth 2 (Two) Times a Day.      hydroCHLOROthiazide (HYDRODIURIL) 12.5 MG tablet Take 1 tablet by mouth Daily.      Lidocaine Viscous HCl (XYLOCAINE) 2 % solution Take 5 mL by mouth 3 (Three) Times a Day As Needed for Mild Pain. 100 mL 0    metoprolol tartrate (LOPRESSOR) 50 MG tablet Take 1 tablet by mouth 2 (Two) Times a Day.      MULTIPLE VITAMINS-MINERALS ER PO Take  by mouth.      olmesartan (BENICAR) 40 MG tablet TAKE 1 TABLET BY MOUTH DAILY 30 tablet 1    pantoprazole (PROTONIX) 40 MG EC tablet Take 1 tablet by mouth Daily. 90 tablet 3    sertraline (Zoloft) 100 MG tablet Take 1 tablet by mouth Daily. 30 tablet 2    valACYclovir (Valtrex) 1000 MG tablet Take 2 tablets by mouth 2 (Two) Times a Day. At onset of cold sore. 30 tablet 2    vitamin B-12 (CYANOCOBALAMIN) 100 MCG tablet Take 0.5 tablets by mouth Daily.       No current facility-administered medications on file prior to visit.          Allergies:  No Known Allergies      Social History:  Never Smoker, but secondhand smoke exposure    Distance From Clinic: <30 minutes    Patient has someone who can assist with transportation: yes      Vital Signs:  /68   Pulse 84   Temp 96.6 °F (35.9 °C)   Ht 152.4 cm (60\")   Wt 64 kg (141 lb)   SpO2 95%   BMI 27.54 kg/m²   Estimated body mass index is 27.54 kg/m² as calculated from the following:    Height as of this encounter: 152.4 cm (60\").    Weight as of this encounter: 64 kg (141 lb).  There were no vitals filed for this visit.      ECOG: Ambulatory and capable of all selfcare but unable to carry out any work activities; up and about more than 50% of waking hours = 2    Physical Exam  Vitals reviewed.   Constitutional:       General: She is not in acute distress.     Appearance: Normal " appearance.   HENT:      Head: Normocephalic and atraumatic.   Eyes:      Extraocular Movements: Extraocular movements intact.      Pupils: Pupils are equal, round, and reactive to light.   Pulmonary:      Effort: Pulmonary effort is normal.   Abdominal:      General: Abdomen is flat.      Palpations: Abdomen is soft.   Musculoskeletal:      Cervical back: Normal range of motion.   Skin:     General: Skin is warm and dry.   Neurological:      General: No focal deficit present.      Mental Status: She is alert and oriented to person, place, and time.   Psychiatric:         Mood and Affect: Mood normal.         Behavior: Behavior normal.        Result Review :  The following data was reviewed by: Max Mccall MD on 10/05/2023:  Labs: Last Creatinine   Data reviewed : Radiologic studies CT Chest             Diagnoses and all orders for this visit:    1. Ground glass opacity present on imaging of lung (Primary)        Assessment:    Norma Trejo is a 89 y.o. female with a past medical history of CVA, pacemaker, hypertension who underwent CT chest on 9/25/2023 for persistent dry cough and right upper extremity pain.  CT imaging demonstrated bilateral GGO's and small pulmonary nodules.  The patient was referred to multidisciplinary thoracic clinic for further work-up and management.    I met with the patient and her family in consultation and discussed her work-up to date.  I discussed that we would repeat imaging in 3 months to monitor the findings in her lungs.  I discussed that she may benefit from referral to pulmonology for her chronic cough and respiratory symptoms.  The patient was amenable to trying this.  Patient can follow-up in thoracic clinic.    The patient will also need to meet with Priscila Clifton and formalize some of her advanced care directives.    Plan:    - Repeat CT chest in 3 months  - Referral to pulmonology  - Patient to meet with Priscila Clifton to formalize some of her advance care  directives       I spent 40 minutes caring for Norma on this date of service. This time includes time spent by me in the following activities:preparing for the visit, reviewing tests, obtaining and/or reviewing a separately obtained history, referring and communicating with other health care professionals , documenting information in the medical record, independently interpreting results and communicating that information with the patient/family/caregiver, and care coordination  Follow Up   No follow-ups on file.  Patient was given instructions and counseling regarding her condition or for health maintenance advice. Please see specific information pulled into the AVS if appropriate.     Max Mccall MD

## 2023-10-13 NOTE — PROGRESS NOTES
[facility]  [address] [address] [phone].    Before we get started may I verify your date of birth? 2/7/1930    I am calling to officially welcome you to our practice and ask about your recent visit. Is this a good time to talk?     Tell me about your visit with us. What things went well?         We're always looking for ways to make our patients' experiences even better. Do you have recommendations on ways we may improve?      Overall were you satisfied with your first visit to our practice?        I appreciate you taking the time to speak with me today. Is there anything else I can do for you?       Thank you, and have a great day.

## 2023-10-23 DIAGNOSIS — F33.0 MILD RECURRENT MAJOR DEPRESSION: ICD-10-CM

## 2023-10-23 DIAGNOSIS — R05.3 PERSISTENT COUGH FOR 3 WEEKS OR LONGER: ICD-10-CM

## 2023-10-23 RX ORDER — ALBUTEROL SULFATE 90 UG/1
2 AEROSOL, METERED RESPIRATORY (INHALATION) EVERY 4 HOURS PRN
Qty: 8 G | Refills: 1 | Status: SHIPPED | OUTPATIENT
Start: 2023-10-23

## 2023-10-23 RX ORDER — HYDROCHLOROTHIAZIDE 12.5 MG/1
12.5 TABLET ORAL DAILY
Qty: 90 TABLET | Refills: 1 | Status: SHIPPED | OUTPATIENT
Start: 2023-10-23 | End: 2026-09-01

## 2023-10-23 RX ORDER — METOPROLOL TARTRATE 50 MG/1
50 TABLET, FILM COATED ORAL 2 TIMES DAILY
Qty: 180 TABLET | Refills: 1 | Status: SHIPPED | OUTPATIENT
Start: 2023-10-23

## 2023-10-23 RX ORDER — SERTRALINE HYDROCHLORIDE 100 MG/1
100 TABLET, FILM COATED ORAL DAILY
Qty: 90 TABLET | Refills: 1 | Status: SHIPPED | OUTPATIENT
Start: 2023-10-23

## 2023-10-23 NOTE — TELEPHONE ENCOUNTER
Caller: MIMI LANDAVERDE    Relationship: Emergency Contact    Best call back number: [phone]    Requested Prescriptions:   Requested Prescriptions     Pending Prescriptions Disp Refills    apixaban (ELIQUIS) 2.5 MG tablet tablet 60 tablet      Sig: Take 1 tablet by mouth 2 (Two) Times a Day.    metoprolol tartrate (LOPRESSOR) 50 MG tablet       Sig: Take 1 tablet by mouth 2 (Two) Times a Day.    hydroCHLOROthiazide (HYDRODIURIL) 12.5 MG tablet 30 tablet 34     Sig: Take 1 tablet by mouth Daily.    sertraline (Zoloft) 100 MG tablet 30 tablet 2     Sig: Take 1 tablet by mouth Daily.    albuterol sulfate  (90 Base) MCG/ACT inhaler 8 g 1     Sig: Inhale 2 puffs Every 4 (Four) Hours As Needed for Wheezing.        Pharmacy where request should be sent: [facility] [id number] [facility] [id number] [facility] [phone] [facility] [phone] FX     Last office visit with prescribing clinician: 8/28/2023   Last telemedicine visit with prescribing clinician: Visit date not found   Next office visit with prescribing clinician: 12/4/2023     Does the patient have less than a 3 day supply:  [] Yes  [x] No    Would you like a call back once the refill request has been completed: [] Yes [x] No    If the office needs to give you a call back, can they leave a voicemail: [] Yes [x] No    Amy Navarro Rep   10/23/23 09:50 EDT         
You have not filled these medications before, okay to order?
done

## 2023-10-25 RX ORDER — ALPRAZOLAM 0.25 MG/1
0.25 TABLET ORAL NIGHTLY PRN
Qty: 90 TABLET | Refills: 1 | Status: SHIPPED | OUTPATIENT
Start: 2023-10-25

## 2023-11-14 RX ORDER — OLMESARTAN MEDOXOMIL 40 MG/1
40 TABLET ORAL DAILY
Qty: 30 TABLET | Refills: 1 | Status: SHIPPED | OUTPATIENT
Start: 2023-11-14

## 2023-12-07 ENCOUNTER — HOSPITAL ENCOUNTER (OUTPATIENT)
Dept: GENERAL RADIOLOGY | Facility: HOSPITAL | Age: 88
Discharge: HOME | End: 2023-12-07
Admitting: NURSE PRACTITIONER
Payer: MEDICARE

## 2023-12-07 ENCOUNTER — OFFICE VISIT (OUTPATIENT)
Dept: INTERNAL MEDICINE | Facility: CLINIC | Age: 88
End: 2023-12-07
Payer: MEDICARE

## 2023-12-07 VITALS
TEMPERATURE: 98.5 F | SYSTOLIC BLOOD PRESSURE: 122 MMHG | BODY MASS INDEX: 28.54 KG/M2 | DIASTOLIC BLOOD PRESSURE: 74 MMHG | HEIGHT: 60 IN | OXYGEN SATURATION: 95 % | WEIGHT: 145.4 LBS | HEART RATE: 68 BPM
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
## 2023-12-07 DIAGNOSIS — H57.89 IRRITATION OF BOTH EYES: ICD-10-CM

## 2023-12-07 DIAGNOSIS — R07.81 RIB PAIN ON RIGHT SIDE: Primary | ICD-10-CM

## 2023-12-07 PROCEDURE — 71101 X-RAY EXAM UNILAT RIBS/CHEST: CPT

## 2023-12-07 NOTE — PROGRESS NOTES
Subjective   Norma Trejo is a 89 y.o. female.     Chief Complaint   Patient presents with    Fall     Pt states she had a fall 2 weeks ago at home and hurt mid chest that radiates to rt breast and ribs.    Eye Pain     Pt c/o rt eye pain and swelling and watery X months.        History of Present Illness   She is here today accompanied by her family friend and daughter to translate with c/o sternal and right anterior rib pain. Symptoms started 2 weeks ago after sustaining a fall at home when she fell onto the bed rail in her room. She denies any head trauma. She has been experiencing pain along the bottom of the sternum that radiates to the right anterior ribs. Pain is worse with deep breathing and palpation of the ribs. She had been taking ibuprofen and tylenol with some improvement.   Pain is improving some.     She is also experiencing bilateral eye pain and watering. Symptoms started a few months ago, but became worse over the past few weeks. She has previously prescribed eye drops by her ophthalmologist without improvement.  She likes to read and this is affecting her ability to do so.  She is scheduled to see the ophthalmologist this afternoon.    The following portions of the patient's history were reviewed and updated as appropriate: allergies, current medications, past family history, past medical history, past social history, past surgical history, and problem list.    Review of Systems   Constitutional:  Negative for chills, fatigue and fever.   Eyes:  Positive for pain, discharge and redness.   Respiratory: Negative.     Cardiovascular:  Positive for chest pain (sternum). Negative for palpitations and leg swelling.   Musculoskeletal:  Positive for arthralgias (right ribs).       Objective   Physical Exam  Constitutional:       Appearance: She is well-developed.   Eyes:      Conjunctiva/sclera:      Right eye: Right conjunctiva is injected.      Left eye: Left conjunctiva is injected.   Neck:       Thyroid: No thyroid mass, thyromegaly or thyroid tenderness.      Vascular: No carotid bruit.      Trachea: Trachea normal.   Cardiovascular:      Rate and Rhythm: Normal rate and regular rhythm.      Chest Wall: PMI is not displaced.      Pulses:           Radial pulses are 2+ on the right side and 2+ on the left side.        Dorsalis pedis pulses are 2+ on the right side and 2+ on the left side.        Posterior tibial pulses are 2+ on the right side and 2+ on the left side.      Heart sounds: S1 normal and S2 normal.   Pulmonary:      Effort: Pulmonary effort is normal.      Breath sounds: Normal breath sounds.   Chest:      Chest wall: Tenderness present.          Comments: Pain on palpation of lower sternum and right anterior ribs.   Musculoskeletal:      Right lower leg: No edema.      Left lower leg: No edema.   Lymphadenopathy:      Head:      Right side of head: No submental, submandibular, tonsillar or occipital adenopathy.      Left side of head: No submental, submandibular, tonsillar or occipital adenopathy.      Cervical: No cervical adenopathy.   Skin:     General: Skin is warm and dry.      Capillary Refill: Capillary refill takes less than 2 seconds.      Nails: There is no clubbing.   Neurological:      Mental Status: She is alert and oriented to person, place, and time.   Psychiatric:         Attention and Perception: Attention normal.         Mood and Affect: Mood and affect normal.         Speech: Speech normal.         Behavior: Behavior normal.         Thought Content: Thought content normal.         Cognition and Memory: Cognition normal.         Vitals:    12/07/23 1005   BP: 122/74   Pulse: 68   Temp: 98.5 °F (36.9 °C)   SpO2: 95%      Body mass index is 28.4 kg/m².    Assessment & Plan   Problems Addressed this Visit    None  Visit Diagnoses       Rib pain on right side    -  Primary    Relevant Orders    XR Ribs Right With PA Chest    Fall, initial encounter        Relevant Orders    XR  Ribs Right With PA Chest    Irritation of both eyes              Diagnoses         Codes Comments    Rib pain on right side    -  Primary ICD-10-CM: R07.81  ICD-9-CM: 786.50     Fall, initial encounter     ICD-10-CM: W19.XXXA  ICD-9-CM: E888.9     Irritation of both eyes     ICD-10-CM: H57.89  ICD-9-CM: 379.99           1.  Right side rib pain/fall-check x-ray right ribs and PA chest today to evaluate for fracture.  Recommend using Tylenol as needed for analgesia along with working on coughing and deep breathing exercises while splinting with a pillow to promote adequate lung expansion.  Recommend avoiding NSAIDs.  Discussed fall risk reduction techniques for at home.  2.  Irritation of both eyes-encouraged her to follow-up with ophthalmology as scheduled this afternoon.

## 2023-12-29 ENCOUNTER — LAB (OUTPATIENT)
Dept: LAB | Facility: HOSPITAL | Age: 88
End: 2023-12-29
Payer: MEDICARE

## 2023-12-29 ENCOUNTER — OFFICE VISIT (OUTPATIENT)
Dept: INTERNAL MEDICINE | Facility: CLINIC | Age: 88
End: 2023-12-29
Payer: MEDICARE

## 2023-12-29 VITALS
SYSTOLIC BLOOD PRESSURE: 102 MMHG | BODY MASS INDEX: 28.51 KG/M2 | TEMPERATURE: 98.4 F | OXYGEN SATURATION: 95 % | DIASTOLIC BLOOD PRESSURE: 58 MMHG | HEIGHT: 60 IN | HEART RATE: 69 BPM | WEIGHT: 145.2 LBS

## 2023-12-29 DIAGNOSIS — R53.81 MALAISE: ICD-10-CM

## 2023-12-29 DIAGNOSIS — N30.00 ACUTE CYSTITIS WITHOUT HEMATURIA: ICD-10-CM

## 2023-12-29 DIAGNOSIS — R63.0 DECREASED APPETITE: ICD-10-CM

## 2023-12-29 DIAGNOSIS — I95.9 HYPOTENSION, UNSPECIFIED HYPOTENSION TYPE: ICD-10-CM

## 2023-12-29 DIAGNOSIS — R53.1 GENERALIZED WEAKNESS: ICD-10-CM

## 2023-12-29 DIAGNOSIS — F41.8 ANXIETY WITH DEPRESSION: Primary | ICD-10-CM

## 2023-12-29 LAB
ALBUMIN SERPL-MCNC: 4.1 G/DL (ref 3.5–5.2)
ALBUMIN/GLOB SERPL: 1.5 G/DL
ALP SERPL-CCNC: 69 U/L (ref 39–117)
ALT SERPL W P-5'-P-CCNC: 22 U/L (ref 1–33)
ANION GAP SERPL CALCULATED.3IONS-SCNC: 9.8 MMOL/L (ref 5–15)
AST SERPL-CCNC: 22 U/L (ref 1–32)
BASOPHILS # BLD AUTO: 0.02 10*3/MM3 (ref 0–0.2)
BASOPHILS NFR BLD AUTO: 0.3 % (ref 0–1.5)
BILIRUB BLD-MCNC: NEGATIVE MG/DL
BILIRUB SERPL-MCNC: 1.1 MG/DL (ref 0–1.2)
BUN SERPL-MCNC: 17 MG/DL (ref 8–23)
BUN/CREAT SERPL: 14.8 (ref 7–25)
CALCIUM SPEC-SCNC: 10 MG/DL (ref 8.2–9.6)
CHLORIDE SERPL-SCNC: 97 MMOL/L (ref 98–107)
CLARITY, POC: CLEAR
CO2 SERPL-SCNC: 27.2 MMOL/L (ref 22–29)
COLOR UR: YELLOW
CREAT SERPL-MCNC: 1.15 MG/DL (ref 0.57–1)
DEPRECATED RDW RBC AUTO: 40.7 FL (ref 37–54)
EGFRCR SERPLBLD CKD-EPI 2021: 44.5 ML/MIN/1.73
EOSINOPHIL # BLD AUTO: 0.15 10*3/MM3 (ref 0–0.4)
EOSINOPHIL NFR BLD AUTO: 2.1 % (ref 0.3–6.2)
ERYTHROCYTE [DISTWIDTH] IN BLOOD BY AUTOMATED COUNT: 11.9 % (ref 12.3–15.4)
EXPIRATION DATE: ABNORMAL
EXPIRATION DATE: NORMAL
FLUAV AG UPPER RESP QL IA.RAPID: NOT DETECTED
FLUBV AG UPPER RESP QL IA.RAPID: NOT DETECTED
GLOBULIN UR ELPH-MCNC: 2.7 GM/DL
GLUCOSE SERPL-MCNC: 107 MG/DL (ref 65–99)
GLUCOSE UR STRIP-MCNC: NEGATIVE MG/DL
HCT VFR BLD AUTO: 40.1 % (ref 34–46.6)
HGB BLD-MCNC: 13.3 G/DL (ref 12–15.9)
IMM GRANULOCYTES # BLD AUTO: 0.08 10*3/MM3 (ref 0–0.05)
IMM GRANULOCYTES NFR BLD AUTO: 1.1 % (ref 0–0.5)
INTERNAL CONTROL: NORMAL
KETONES UR QL: NEGATIVE
LEUKOCYTE EST, POC: ABNORMAL
LYMPHOCYTES # BLD AUTO: 2.06 10*3/MM3 (ref 0.7–3.1)
LYMPHOCYTES NFR BLD AUTO: 29 % (ref 19.6–45.3)
Lab: ABNORMAL
Lab: NORMAL
MAGNESIUM SERPL-MCNC: 1.8 MG/DL (ref 1.7–2.3)
MCH RBC QN AUTO: 30.6 PG (ref 26.6–33)
MCHC RBC AUTO-ENTMCNC: 33.2 G/DL (ref 31.5–35.7)
MCV RBC AUTO: 92.4 FL (ref 79–97)
MONOCYTES # BLD AUTO: 0.79 10*3/MM3 (ref 0.1–0.9)
MONOCYTES NFR BLD AUTO: 11.1 % (ref 5–12)
NEUTROPHILS NFR BLD AUTO: 4 10*3/MM3 (ref 1.7–7)
NEUTROPHILS NFR BLD AUTO: 56.4 % (ref 42.7–76)
NITRITE UR-MCNC: NEGATIVE MG/ML
NRBC BLD AUTO-RTO: 0 /100 WBC (ref 0–0.2)
PH UR: 5.5 [PH] (ref 5–8)
PLATELET # BLD AUTO: 187 10*3/MM3 (ref 140–450)
PMV BLD AUTO: 8.7 FL (ref 6–12)
POTASSIUM SERPL-SCNC: 4.2 MMOL/L (ref 3.5–5.2)
PROT SERPL-MCNC: 6.8 G/DL (ref 6–8.5)
PROT UR STRIP-MCNC: NEGATIVE MG/DL
RBC # BLD AUTO: 4.34 10*6/MM3 (ref 3.77–5.28)
RBC # UR STRIP: NEGATIVE /UL
SARS-COV-2 AG UPPER RESP QL IA.RAPID: NOT DETECTED
SODIUM SERPL-SCNC: 134 MMOL/L (ref 136–145)
SP GR UR: 1.01 (ref 1–1.03)
UROBILINOGEN UR QL: NORMAL
WBC NRBC COR # BLD AUTO: 7.1 10*3/MM3 (ref 3.4–10.8)

## 2023-12-29 PROCEDURE — 85025 COMPLETE CBC W/AUTO DIFF WBC: CPT | Performed by: NURSE PRACTITIONER

## 2023-12-29 PROCEDURE — 36415 COLL VENOUS BLD VENIPUNCTURE: CPT | Performed by: NURSE PRACTITIONER

## 2023-12-29 PROCEDURE — 83735 ASSAY OF MAGNESIUM: CPT | Performed by: NURSE PRACTITIONER

## 2023-12-29 PROCEDURE — 80053 COMPREHEN METABOLIC PANEL: CPT | Performed by: NURSE PRACTITIONER

## 2023-12-29 RX ORDER — AMOXICILLIN 500 MG/1
500 CAPSULE ORAL 2 TIMES DAILY
Qty: 10 CAPSULE | Refills: 0 | Status: SHIPPED | OUTPATIENT
Start: 2023-12-29 | End: 2024-01-03

## 2023-12-29 RX ORDER — PREDNISOLONE ACETATE 10 MG/ML
SUSPENSION/ DROPS OPHTHALMIC
COMMUNITY
Start: 2023-12-07

## 2023-12-29 RX ORDER — SERTRALINE HYDROCHLORIDE 100 MG/1
100 TABLET, FILM COATED ORAL DAILY
Qty: 90 TABLET | Refills: 1 | Status: SHIPPED | OUTPATIENT
Start: 2023-12-29

## 2023-12-29 NOTE — PROGRESS NOTES
Subjective   Norma Trejo is a 89 y.o. female.     Chief Complaint   Patient presents with    Generalized Body Aches     Pt c/o body aches, HA, diarrhea 3 times yesterday, no energy, no appetite, leg weakness and aches X 9 days.    no energy    Headache    Diarrhea    Extremity Weakness    Depression        History of Present Illness   She is here today accompanied by her daughters to translate, with complaints of weakness, fatigue, decreased appetite, body aches, headache and diarrhea. Symptoms started over a week ago with fatigue, depression, anxiety, decreased appetite, body aches and weakness.  She notes pain in multiple joints and muscles. Last week was the anniversary of her 's passing.  Her family had a mass service to honor him.  Worsening depression anxiety along with fatigue started after the service.  She notes feeling more down and depressed recently.  She is currently taking sertraline 100 mg daily.  Her  has come to the home to speak with her which has helped some with her mood.  She has told her daughters that she does not want to be here anymore and has given up.  Her daughters are very concerned.  She has not been wanting to eat or drink.  She is now having to use a walker to ambulate secondary to lower extremity weakness.  She had an Ensure and toast today, but needs encouragement. She has to be reminded to eat and drink.  She is drinking on average 4 glasses of water a day.  Her daughter notes that her blood pressure has been elevated recently at home with systolics in the 150s.  She denies any fever, chills, URI symptoms, urinary issues, dizziness, confusion, suicidal ideation.      The following portions of the patient's history were reviewed and updated as appropriate: allergies, current medications, past family history, past medical history, past social history, past surgical history, and problem list.    Review of Systems   Constitutional:  Positive for activity change,  appetite change and fatigue. Negative for chills and fever.   HENT: Negative.     Respiratory: Negative.     Cardiovascular: Negative.    Gastrointestinal: Negative.    Genitourinary: Negative.    Musculoskeletal:  Positive for arthralgias and myalgias.   Neurological:  Positive for weakness. Negative for dizziness, light-headedness, headache, memory problem and confusion.       Objective   Physical Exam  Constitutional:       General: She is awake.      Appearance: She is well-developed.   Neck:      Thyroid: No thyroid mass, thyromegaly or thyroid tenderness.      Vascular: No carotid bruit.      Trachea: Trachea normal.   Cardiovascular:      Rate and Rhythm: Normal rate and regular rhythm.      Chest Wall: PMI is not displaced.      Pulses:           Radial pulses are 2+ on the right side and 2+ on the left side.        Dorsalis pedis pulses are 2+ on the right side and 2+ on the left side.        Posterior tibial pulses are 2+ on the right side and 2+ on the left side.      Heart sounds: S1 normal and S2 normal.   Pulmonary:      Effort: Pulmonary effort is normal.      Breath sounds: Normal breath sounds.   Abdominal:      General: Bowel sounds are normal. There is no distension or abdominal bruit. There are no signs of injury.      Palpations: Abdomen is soft. There is no hepatomegaly or splenomegaly.      Tenderness: There is abdominal tenderness in the suprapubic area. There is no guarding or rebound.   Musculoskeletal:      Right lower leg: No edema.      Left lower leg: No edema.   Lymphadenopathy:      Head:      Right side of head: No submental, submandibular, tonsillar or occipital adenopathy.      Left side of head: No submental, submandibular, tonsillar or occipital adenopathy.      Cervical: No cervical adenopathy.   Skin:     General: Skin is warm and dry.      Capillary Refill: Capillary refill takes less than 2 seconds.      Nails: There is no clubbing.   Neurological:      Mental Status: She is  alert and oriented to person, place, and time.   Psychiatric:         Attention and Perception: Attention and perception normal.         Mood and Affect: Mood is depressed. Affect is flat.         Speech: Speech normal.         Behavior: Behavior normal. Behavior is cooperative.         Thought Content: Thought content normal.         Cognition and Memory: Cognition and memory normal.         Judgment: Judgment normal.         Vitals:    12/29/23 0959   BP: 102/58   Pulse: 69   Temp: 98.4 °F (36.9 °C)   SpO2: 95%      Body mass index is 28.36 kg/m².    Assessment & Plan   Problems Addressed this Visit       Anxiety with depression - Primary    Relevant Medications    sertraline (Zoloft) 100 MG tablet    sertraline (Zoloft) 50 MG tablet    Other Relevant Orders    CBC & Differential (Completed)    Comprehensive Metabolic Panel (Completed)     Other Visit Diagnoses       Malaise        Relevant Medications    amoxicillin (AMOXIL) 500 MG capsule    Other Relevant Orders    CBC & Differential (Completed)    Comprehensive Metabolic Panel (Completed)    Magnesium (Completed)    POCT urinalysis dipstick, automated (Completed)    Urine Culture - Urine, Urine, Clean Catch    Generalized weakness        Relevant Orders    CBC & Differential (Completed)    Comprehensive Metabolic Panel (Completed)    Magnesium (Completed)    POCT SARS-CoV-2 Antigen MATIAS + Flu (Completed)    POCT urinalysis dipstick, automated (Completed)    Urine Culture - Urine, Urine, Clean Catch    Hypotension, unspecified hypotension type        Relevant Orders    CBC & Differential (Completed)    Comprehensive Metabolic Panel (Completed)    Magnesium (Completed)    Acute cystitis without hematuria        Relevant Medications    amoxicillin (AMOXIL) 500 MG capsule    Decreased appetite              Diagnoses         Codes Comments    Anxiety with depression    -  Primary ICD-10-CM: F41.8  ICD-9-CM: 300.4     Malaise     ICD-10-CM: R53.81  ICD-9-CM: 780.79      Generalized weakness     ICD-10-CM: R53.1  ICD-9-CM: 780.79     Hypotension, unspecified hypotension type     ICD-10-CM: I95.9  ICD-9-CM: 458.9     Acute cystitis without hematuria     ICD-10-CM: N30.00  ICD-9-CM: 595.0     Decreased appetite     ICD-10-CM: R63.0  ICD-9-CM: 783.0           1.  Anxiety with depression-not controlled.  Will increase sertraline to 150 mg daily.  New prescription sent to the pharmacy.  Discussed with her family seeing if her  can come and visit with her as this seems to be one of the only things that helps improve her mood.  Recommend continuing to encourage her to eat and drink frequently.  Notify for any suicidal thoughts.  Follow-up in 4 weeks.  2.  Malaise/generalized weakness, decreased appetite-urine dip completed with trace leuks, urine sent for culture.  Check labs today including CBC, CMP and magnesium.  Will treat for possible underlying UTI with amoxicillin 500 mg twice daily for 5 days.  Encouraged her to continue to work on hydrating well with fluids.  Will contact her pending lab results.  Also discussed with the family today possible referral for hospice.  They would like to wait on this for now.  3.  Hypotension-BP low today in office.  This may also be contributing to fatigue in malaise.  Recommend monitoring blood pressure twice daily.  If BP this afternoon less than 110/60 recommend holding off on him blood pressure medications.  Continue to monitor this daily and notify me if consistently less than 110/60 recommend stopping amlodipine.

## 2023-12-31 LAB
BACTERIA UR CULT: NORMAL
BACTERIA UR CULT: NORMAL

## 2024-01-10 ENCOUNTER — HOSPITAL ENCOUNTER (OUTPATIENT)
Dept: CT IMAGING | Facility: HOSPITAL | Age: 89
Discharge: HOME | End: 2024-01-10
Admitting: SURGERY
Payer: MEDICARE

## 2024-01-10 DIAGNOSIS — R91.8 GROUND GLASS OPACITY PRESENT ON IMAGING OF LUNG: ICD-10-CM

## 2024-01-10 PROCEDURE — 71250 CT THORAX DX C-: CPT

## 2024-01-11 RX ORDER — OLMESARTAN MEDOXOMIL 40 MG/1
40 TABLET ORAL DAILY
Qty: 90 TABLET | Refills: 1 | Status: SHIPPED | OUTPATIENT
Start: 2024-01-11

## 2024-01-12 ENCOUNTER — TELEPHONE (OUTPATIENT)
Dept: INTERNAL MEDICINE | Facility: CLINIC | Age: 89
End: 2024-01-12
Payer: MEDICARE

## 2024-01-12 DIAGNOSIS — J06.9 VIRAL URI WITH COUGH: Primary | ICD-10-CM

## 2024-01-12 RX ORDER — BENZONATATE 200 MG/1
200 CAPSULE ORAL 3 TIMES DAILY PRN
Qty: 21 CAPSULE | Refills: 0 | Status: SHIPPED | OUTPATIENT
Start: 2024-01-12

## 2024-01-12 NOTE — TELEPHONE ENCOUNTER
Called and spoke with patient's daughter Theresa to discuss with her that someone from the multidisciplinary clinic should be contacting her and her mother with results of most recent CT scan. She will notify me if she does not hear from their office next week.  She does note that Norma has had a URI over the past few days with cough and congestion.  I have recommended plain Mucinex, Delsym for cough, vitamin C, vitamin D and hydrating well with fluids.  I will send in a prescription for Tessalon for her to use 3 times daily as needed for cough.  She will notify for worsening symptoms.

## 2024-01-12 NOTE — TELEPHONE ENCOUNTER
Caller: MIMI LANDAVERDE    Relationship: Emergency Contact    Best call back number: [phone]    What is the best time to reach you: ANYTIME    Who are you requesting to speak with (clinical staff, provider,  specific staff member): ELSA CHRIS ASSISTANT    What was the call regarding: PATIENT'S DAUGHTER IS REQUESTING A CALLBACK FROM ELSA CHRIS'S ASSISTANT FOR RESULTS FROM A CT OF HER CHEST FROM 1/10/24. HUB INFORMED PATIENT'S DAUGHTER THAT IT WAS FROM ANOTHER PROVIDER AND SHE WOULD HAVE TO CONTACT THEIR OFFICE IN ORDER TO GET THE RESULTS. PATIENT'S DAUGHTER INSISTED ON SEND A MESSAGE FOR A CALLBACK.

## 2024-01-15 ENCOUNTER — TELEPHONE (OUTPATIENT)
Dept: SURGERY | Facility: CLINIC | Age: 89
End: 2024-01-15
Payer: MEDICARE

## 2024-01-15 NOTE — TELEPHONE ENCOUNTER
Pt daughter confirmed her mother will be able to make her appointment on 1/18/2024    DB 7:36  1/15/2024

## 2024-01-16 RX ORDER — AMLODIPINE BESYLATE 2.5 MG/1
2.5 TABLET ORAL DAILY
Qty: 90 TABLET | Refills: 1 | Status: SHIPPED | OUTPATIENT
Start: 2024-01-16

## 2024-01-17 ENCOUNTER — TELEPHONE (OUTPATIENT)
Dept: SURGERY | Facility: CLINIC | Age: 89
End: 2024-01-17
Payer: MEDICARE

## 2024-01-18 ENCOUNTER — TELEPHONE (OUTPATIENT)
Dept: SURGERY | Facility: CLINIC | Age: 89
End: 2024-01-18

## 2024-01-18 ENCOUNTER — TELEMEDICINE (OUTPATIENT)
Dept: SURGERY | Facility: CLINIC | Age: 89
End: 2024-01-18
Payer: MEDICARE

## 2024-01-18 DIAGNOSIS — R91.1 LUNG NODULE: ICD-10-CM

## 2024-01-18 DIAGNOSIS — R05.2 SUBACUTE COUGH: Primary | ICD-10-CM

## 2024-01-18 NOTE — PROGRESS NOTES
"Chief Complaint  New patient, right upper lobe groundglass opacity    You have chosen to receive care through a telehealth visit.  Do you consent to use a video/audio connection for your medical care today? Yes; patient unable to connect to video visit      Subjective        Norma Trejo presents to [facility] THORACIC SURGERY  History of Present Illness    Patient is a very pleasant 89-year-old lady who was initially seen in multidisciplinary thoracic oncology clinic by Dr. Mccall of radiation oncology in October 2023.  She was found to have bilateral groundglass opacities and small pulmonary nodules and has been undergoing surveillance since that time.  Imaging was ordered secondary to a persistent dry cough that has been occurring for several months without much improvement.  She has taken over-the-counter cough medicine without significant relief.  She has also tried antihistamines intermittently.  The cough appears to have gotten worse in the cold weather.  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  She presents today via telephone in her [facility] state of health to discuss her most recent CT chest.    HPI obtained from daughter, Theresa.      Objective   Vital Signs:  There were no vitals taken for this visit.  Estimated body mass index is 28.36 kg/m² as calculated from the following:    Height as of 12/29/23: 152.4 cm (60\").    Weight as of 12/29/23: 65.9 kg (145 lb 3.2 oz).             Physical Exam     Deferred secondary to phone visit  Result Review :            I have independently reviewed the CT of the chest performed on 1/10/2024 which demonstrates ill-defined groundglass opacities in the bilateral lungs.  Stable scattered micronodules.  There is increased density to the right upper lobe that could be inflammatory.  There is a 10 mm nodule in the left upper lobe.  No " suspicious mediastinal lymphadenopathy.  No pleural or pericardial effusion.           Assessment and Plan     Diagnoses and all orders for this visit:    1. Subacute cough (Primary)  -     CT Chest Without Contrast; Future    2. Lung nodule  -     CT Chest Without Contrast; Future    Patient's most recent CT chest demonstrates persistent groundglass opacities and scattered micronodules without significant change.  There is a 10 mm nodule in the left upper lobe without change.  I have recommended for patient to consistently take antihistamines as this could be related to postnasal drip/seasonal allergies.  Nothing seen on imaging with definite cause to explain persistent cough.  Recommend to continue surveillance with CT chest in 6 months.  Should any lesion enlarge to greater than 1 cm, we would likely recommend further workup with PET scan.  Patient and daughter are in agreement with this plan.       I spent 30 minutes caring for Norma on this date of service. This time includes time spent by me in the following activities:preparing for the visit, reviewing tests, obtaining and/or reviewing a separately obtained history, counseling and educating the patient/family/caregiver, ordering medications, tests, or procedures, documenting information in the medical record, independently interpreting results and communicating that information with the patient/family/caregiver, and care coordination  Follow Up     Return in about 6 months (around 7/18/2024) for Next scheduled follow up.  Patient was given instructions and counseling regarding her condition or for health maintenance advice. Please see specific information pulled into the AVS if appropriate.

## 2024-01-18 NOTE — LETTER
January 18, 2024     ELSA Brewer  [address]  [address]  [facility] [address]    Patient: Norma Trejo   YOB: 1930   Date of Visit: 1/18/2024     Dear ELSA Brewer:       Thank you for referring Norma Trejo to me for evaluation. Below are the relevant portions of my assessment and plan of care.    If you have questions, please do not hesitate to call me. I look forward to following Norma along with you.         Sincerely,        Luz Rubi DNP, APRN        CC: No Recipients    Luz Rubi DNP, ELSA  01/18/24 1439  Sign when Signing Visit  Chief Complaint  New patient, right upper lobe groundglass opacity    You have chosen to receive care through a telehealth visit.  Do you consent to use a video/audio connection for your medical care today? Yes; patient unable to connect to video visit      Subjective       Norma Trejo presents to [facility] THORACIC SURGERY  History of Present Illness    Patient is a very pleasant 89-year-old lady who was initially seen in multidisciplinary thoracic oncology clinic by Dr. Mccall of radiation oncology in October 2023.  She was found to have bilateral groundglass opacities and small pulmonary nodules and has been undergoing surveillance since that time.  Imaging was ordered secondary to a persistent dry cough that has been occurring for several months without much improvement.  She has taken over-the-counter cough medicine without significant relief.  She has also tried antihistamines intermittently.  The cough appears to have gotten worse in the cold weather.  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  She presents today via telephone in her [facility] state of health to discuss her most recent CT chest.    HPI obtained from daughter, Theresa.      Objective  Vital Signs:  There were no  "vitals taken for this visit.  Estimated body mass index is 28.36 kg/m² as calculated from the following:    Height as of 12/29/23: 152.4 cm (60\").    Weight as of 12/29/23: 65.9 kg (145 lb 3.2 oz).             Physical Exam     Deferred secondary to phone visit  Result Review:            I have independently reviewed the CT of the chest performed on 1/10/2024 which demonstrates ill-defined groundglass opacities in the bilateral lungs.  Stable scattered micronodules.  There is increased density to the right upper lobe that could be inflammatory.  There is a 10 mm nodule in the left upper lobe.  No suspicious mediastinal lymphadenopathy.  No pleural or pericardial effusion.           Assessment and Plan     Diagnoses and all orders for this visit:    1. Subacute cough (Primary)  -     CT Chest Without Contrast; Future    2. Lung nodule  -     CT Chest Without Contrast; Future    Patient's most recent CT chest demonstrates persistent groundglass opacities and scattered micronodules without significant change.  There is a 10 mm nodule in the left upper lobe without change.  I have recommended for patient to consistently take antihistamines as this could be related to postnasal drip/seasonal allergies.  Nothing seen on imaging with definite cause to explain persistent cough.  Recommend to continue surveillance with CT chest in 6 months.  Should any lesion enlarge to greater than 1 cm, we would likely recommend further workup with PET scan.  Patient and daughter are in agreement with this plan.       I spent 30 minutes caring for Norma on this date of service. This time includes time spent by me in the following activities:preparing for the visit, reviewing tests, obtaining and/or reviewing a separately obtained history, counseling and educating the patient/family/caregiver, ordering medications, tests, or procedures, documenting information in the medical record, independently interpreting results and communicating that " information with the patient/family/caregiver, and care coordination  Follow Up     Return in about 6 months (around 7/18/2024) for Next scheduled follow up.  Patient was given instructions and counseling regarding her condition or for health maintenance advice. Please see specific information pulled into the AVS if appropriate.

## 2024-01-29 ENCOUNTER — OFFICE VISIT (OUTPATIENT)
Dept: INTERNAL MEDICINE | Facility: CLINIC | Age: 89
End: 2024-01-29
Payer: MEDICARE

## 2024-01-29 VITALS
WEIGHT: 143.9 LBS | DIASTOLIC BLOOD PRESSURE: 68 MMHG | OXYGEN SATURATION: 96 % | SYSTOLIC BLOOD PRESSURE: 130 MMHG | TEMPERATURE: 97.9 F | HEART RATE: 85 BPM | HEIGHT: 60 IN | BODY MASS INDEX: 28.25 KG/M2

## 2024-01-29 DIAGNOSIS — K13.79 MOUTH SORES: Primary | ICD-10-CM

## 2024-01-29 DIAGNOSIS — J02.9 PHARYNGITIS, UNSPECIFIED ETIOLOGY: ICD-10-CM

## 2024-01-29 DIAGNOSIS — B00.1 HERPES LABIALIS: ICD-10-CM

## 2024-01-29 PROCEDURE — 1160F RVW MEDS BY RX/DR IN RCRD: CPT | Performed by: NURSE PRACTITIONER

## 2024-01-29 PROCEDURE — 1159F MED LIST DOCD IN RCRD: CPT | Performed by: NURSE PRACTITIONER

## 2024-01-29 PROCEDURE — 99213 OFFICE O/P EST LOW 20 MIN: CPT | Performed by: NURSE PRACTITIONER

## 2024-01-29 RX ORDER — DEXAMETHASONE 0.5 MG/5ML
1 SOLUTION ORAL 3 TIMES DAILY
Qty: 240 ML | Refills: 0 | Status: SHIPPED | OUTPATIENT
Start: 2024-01-29 | End: 2024-01-31

## 2024-01-29 RX ORDER — VALACYCLOVIR HYDROCHLORIDE 1 G/1
1000 TABLET, FILM COATED ORAL 2 TIMES DAILY
Qty: 30 TABLET | Refills: 2 | Status: SHIPPED | OUTPATIENT
Start: 2024-01-29 | End: 2024-02-05

## 2024-01-29 NOTE — PROGRESS NOTES
Subjective   Nomra Trejo is a 89 y.o. female.     Chief Complaint   Patient presents with    Mouth Lesions     Pt c/o mouth ulcer that is burning and not better with meds that were given at .        History of Present Illness   She is here today with complaints of painful mouth sores.  She was initially seen for this at urgent care on 1/25.  She was prescribed acyclovir oral suspension and viscous lidocaine.  She has been using this as prescribed without improvement.    Mouth sores initially started a week ago. This is becoming more painful. Pain is along the underside of the tongue, the roof of the mouth, the inside of the lips and right side of the throat.  Pain is making it difficult for her to eat and drink.  Spicy foods, hot foods or beverages and acidic foods make symptoms worse.  She is currently taking a B12 supplement daily.  She notes pain with swallowing.  She denies any difficulty swallowing.  She denies any fever, chills or swollen glands.  She denies any recent illness.  She notes chronic dry mouth for which she uses an oral rehydration solution with some improvement.  She is scheduled to see ENT on February 13.    The following portions of the patient's history were reviewed and updated as appropriate: allergies, current medications, past family history, past medical history, past social history, past surgical history, and problem list.    Review of Systems   Constitutional:  Positive for appetite change. Negative for chills, fatigue and fever.   HENT:  Positive for sore throat. Negative for congestion, postnasal drip, rhinorrhea, swollen glands and trouble swallowing.    Respiratory: Negative.     Cardiovascular: Negative.        Objective   Physical Exam  Constitutional:       Appearance: She is well-developed.   HENT:      Head: Normocephalic and atraumatic.      Mouth/Throat:      Lips: Pink.      Mouth: Mucous membranes are moist. Oral lesions present.      Dentition: Normal dentition.       Tongue: Lesions present.      Palate: No mass and lesions.      Pharynx: Uvula midline. Posterior oropharyngeal erythema present. No pharyngeal swelling, oropharyngeal exudate or uvula swelling.   Neck:      Thyroid: No thyroid mass, thyromegaly or thyroid tenderness.      Vascular: No carotid bruit.      Trachea: Trachea normal.   Cardiovascular:      Rate and Rhythm: Normal rate and regular rhythm.      Chest Wall: PMI is not displaced.      Pulses:           Radial pulses are 2+ on the right side and 2+ on the left side.        Dorsalis pedis pulses are 2+ on the right side and 2+ on the left side.        Posterior tibial pulses are 2+ on the right side and 2+ on the left side.      Heart sounds: S1 normal and S2 normal.   Pulmonary:      Effort: Pulmonary effort is normal.      Breath sounds: Normal breath sounds.   Musculoskeletal:      Right lower leg: No edema.      Left lower leg: No edema.   Lymphadenopathy:      Head:      Right side of head: No submental, submandibular, tonsillar or occipital adenopathy.      Left side of head: No submental, submandibular, tonsillar or occipital adenopathy.      Cervical: No cervical adenopathy.   Skin:     General: Skin is warm and dry.      Capillary Refill: Capillary refill takes less than 2 seconds.      Nails: There is no clubbing.   Neurological:      Mental Status: She is alert and oriented to person, place, and time.   Psychiatric:         Attention and Perception: Attention normal.         Mood and Affect: Mood and affect normal.         Speech: Speech normal.         Behavior: Behavior normal.         Thought Content: Thought content normal.         Cognition and Memory: Cognition normal.         Vitals:    01/29/24 1125   BP: 130/68   Pulse: 85   Temp: 97.9 °F (36.6 °C)   SpO2: 96%      Body mass index is 28.1 kg/m².    Assessment & Plan   Problems Addressed this Visit       Herpes labialis    Relevant Medications    valACYclovir (Valtrex) 1000 MG tablet      Other Visit Diagnoses       Mouth sores    -  Primary    Relevant Medications    dexAMETHasone 0.5 MG/5ML solution    valACYclovir (Valtrex) 1000 MG tablet    Pharyngitis, unspecified etiology        Relevant Medications    dexAMETHasone 0.5 MG/5ML solution          Diagnoses         Codes Comments    Mouth sores    -  Primary ICD-10-CM: K13.79  ICD-9-CM: 528.9     Herpes labialis     ICD-10-CM: B00.1  ICD-9-CM: 054.9     Pharyngitis, unspecified etiology     ICD-10-CM: J02.9  ICD-9-CM: 462           1.  Mouth sores/pharyngitis-throat culture completed today in office.  Will change from acyclovir suspension to valacyclovir 1000 mg twice daily for 1 week.  Okay to continue with viscous lidocaine as needed.  Start dexamethasone oral solution 1 mg 3 times daily swish and spit.  Continue this for 3 days.  Recommend cool fluids and food.  Avoid hot foods, spicy foods and acidic foods.  Continue B12 supplement.  Will check with ENT to evaluate if she can be seen sooner.

## 2024-01-31 ENCOUNTER — OFFICE VISIT (OUTPATIENT)
Dept: INTERNAL MEDICINE | Facility: CLINIC | Age: 89
End: 2024-01-31
Payer: MEDICARE

## 2024-01-31 VITALS
OXYGEN SATURATION: 97 % | DIASTOLIC BLOOD PRESSURE: 88 MMHG | HEIGHT: 60 IN | WEIGHT: 145.7 LBS | TEMPERATURE: 98.3 F | HEART RATE: 88 BPM | SYSTOLIC BLOOD PRESSURE: 146 MMHG | BODY MASS INDEX: 28.61 KG/M2

## 2024-01-31 DIAGNOSIS — R79.89 ELEVATED SERUM CREATININE: ICD-10-CM

## 2024-01-31 DIAGNOSIS — F41.8 ANXIETY WITH DEPRESSION: Primary | ICD-10-CM

## 2024-01-31 DIAGNOSIS — R73.9 HYPERGLYCEMIA: ICD-10-CM

## 2024-01-31 DIAGNOSIS — K14.6 BURNING MOUTH SYNDROME: ICD-10-CM

## 2024-01-31 PROCEDURE — 99213 OFFICE O/P EST LOW 20 MIN: CPT | Performed by: NURSE PRACTITIONER

## 2024-01-31 PROCEDURE — 1160F RVW MEDS BY RX/DR IN RCRD: CPT | Performed by: NURSE PRACTITIONER

## 2024-01-31 PROCEDURE — 1159F MED LIST DOCD IN RCRD: CPT | Performed by: NURSE PRACTITIONER

## 2024-01-31 NOTE — PROGRESS NOTES
Subjective   Norma Trejo is a 89 y.o. female.     Chief Complaint   Patient presents with    Anxiety    Depression        History of Present Illness   She is here today for follow-up on anxiety and depression.  Her daughter is accompanying her today to translate.  Her sertraline was increased to 150 mg daily.  She notes improvement in anxiety and depression on the higher dose.    She has been experiencing burning sensation in the mouth and throat.  This has been causing more anxiety and depression.  She has tried acyclovir solution and viscous lidocaine without improvement. She was then seen in our office on Monday with worsening symptoms despite treatment.  I prescribed her dexamethasone and oral valacyclovir and recommended she be evaluated by ENT.  She was evaluated by ENT, Dr. 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  She has been using oralgel and a mouth moisturizer daily.  She is avoiding spicy and acidic foods.  She is trying to hydrate well with fluids.  She has held her olmesartan today and notes some improvement in the throat discomfort.  She still notes intermittent pain in the mouth, described as something jumping around in her mouth typically along the tongue and on the roof of the mouth.  This improves with eating and returns after she is finished.    The following portions of the patient's history were reviewed and updated as appropriate: allergies, current medications, past family history, past medical history, past social history, past surgical history, and problem list.    Review of Systems   Constitutional:  Negative for chills, fatigue and fever.   HENT:  Negative for mouth sores, sore throat and trouble swallowing.         Mouth pain   Respiratory: Negative.     Cardiovascular: Negative.    Psychiatric/Behavioral:  Positive for depressed mood. Negative for sleep disturbance and suicidal ideas. The  patient is nervous/anxious.        Objective   Physical Exam  Constitutional:       Appearance: She is well-developed.   HENT:      Mouth/Throat:      Lips: Pink.      Mouth: Mucous membranes are dry. No injury or oral lesions.      Dentition: Normal dentition. No dental abscesses or gum lesions.      Tongue: No lesions. Tongue does not deviate from midline.      Palate: No mass and lesions.      Pharynx: Oropharynx is clear. Uvula midline.      Comments: Tongue beefy red with fissures.  Neck:      Thyroid: No thyroid mass, thyromegaly or thyroid tenderness.      Vascular: No carotid bruit.      Trachea: Trachea normal.   Cardiovascular:      Rate and Rhythm: Normal rate and regular rhythm.      Chest Wall: PMI is not displaced.      Pulses:           Radial pulses are 2+ on the right side and 2+ on the left side.        Dorsalis pedis pulses are 2+ on the right side and 2+ on the left side.        Posterior tibial pulses are 2+ on the right side and 2+ on the left side.      Heart sounds: S1 normal and S2 normal.   Pulmonary:      Effort: Pulmonary effort is normal.      Breath sounds: Normal breath sounds.   Musculoskeletal:      Right lower leg: No edema.      Left lower leg: No edema.   Lymphadenopathy:      Head:      Right side of head: No submental, submandibular, tonsillar or occipital adenopathy.      Left side of head: No submental, submandibular, tonsillar or occipital adenopathy.      Cervical: No cervical adenopathy.   Skin:     General: Skin is warm and dry.      Capillary Refill: Capillary refill takes less than 2 seconds.      Nails: There is no clubbing.   Neurological:      Mental Status: She is alert and oriented to person, place, and time.   Psychiatric:         Attention and Perception: Attention normal.         Mood and Affect: Mood and affect normal.         Speech: Speech normal.         Behavior: Behavior normal.         Thought Content: Thought content normal.         Cognition and Memory:  Cognition normal.         Vitals:    01/31/24 1456   BP: 146/88   Pulse: 88   Temp: 98.3 °F (36.8 °C)   SpO2: 97%      Body mass index is 28.46 kg/m².    Assessment & Plan   Problems Addressed this Visit       Anxiety with depression - Primary     Other Visit Diagnoses       Burning mouth syndrome        Relevant Orders    Hemoglobin A1c    Vitamin B12    Folate    Elevated serum creatinine        Relevant Orders    Basic Metabolic Panel    Hyperglycemia        Relevant Orders    Hemoglobin A1c          Diagnoses         Codes Comments    Anxiety with depression    -  Primary ICD-10-CM: F41.8  ICD-9-CM: 300.4     Burning mouth syndrome     ICD-10-CM: K14.6  ICD-9-CM: 529.6     Elevated serum creatinine     ICD-10-CM: R79.89  ICD-9-CM: 790.99     Hyperglycemia     ICD-10-CM: R73.9  ICD-9-CM: 790.29           1.  Anxiety with depression-she is having potential side effects of burning mouth on the higher dose of sertraline.  Will reduce sertraline back to 100 mg daily and see how she responds.  May need to continue titrating down on the sertraline to 50 mg daily.  Her daughter will notify me in 4 weeks after adjusting the dose.  2.  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  Will reduce sertraline back down to 100 mg daily and stop olmesartan 40 mg daily.  Her daughter will monitor her blood pressure closely and if BP persistently greater than 140/90 we will increase amlodipine to 5 mg daily.  Continue oral hydration, mouth moisturizer.  Avoid spicy and acidic foods, hot beverages.  Okay for Tylenol as needed for analgesia.  Okay to try hard candies to keep the mouth moisturized.  Discussed with her and her daughter that it may take several weeks to months to fully resolve.  Recheck BMP, A1c and B12 and folate in 4 weeks.

## 2024-02-02 LAB
BACTERIA SPEC CULT: NORMAL
MICROORGANISM/AGENT SPEC: NORMAL

## 2024-03-18 DIAGNOSIS — F33.0 MILD RECURRENT MAJOR DEPRESSION: Primary | ICD-10-CM

## 2024-03-18 RX ORDER — HYDROCHLOROTHIAZIDE 12.5 MG/1
12.5 TABLET ORAL DAILY
Qty: 90 TABLET | Refills: 1 | Status: SHIPPED | OUTPATIENT
Start: 2024-03-18 | End: 2027-01-26

## 2024-03-18 RX ORDER — ATORVASTATIN CALCIUM 20 MG/1
20 TABLET, FILM COATED ORAL DAILY
Qty: 90 TABLET | Refills: 1 | Status: SHIPPED | OUTPATIENT
Start: 2024-03-18

## 2024-03-18 NOTE — TELEPHONE ENCOUNTER
Caller: MIMI LANDAVERDE    Relationship: Emergency Contact    Best call back number: [phone]     Requested Prescriptions:   Requested Prescriptions     Pending Prescriptions Disp Refills    atorvastatin (LIPITOR) 20 MG tablet 90 tablet      Sig: Take 1 tablet by mouth Daily.        Pharmacy where request should be sent: [facility] [id number] [id number] [address] [facility] [address] [phone] [facility] [phone] FX     Last office visit with prescribing clinician: 1/31/2024   Last telemedicine visit with prescribing clinician: Visit date not found   Next office visit with prescribing clinician: Visit date not found     Additional details provided by patient: PATIENT'S DAUGHTER STATES THAT SHE HAS A WEEK OF MEDICATION REMAINING    Does the patient have less than a 3 day supply:  [] Yes  [x] No    Would you like a call back once the refill request has been completed: [] Yes [x] No    If the office needs to give you a call back, can they leave a voicemail: [] Yes [x] No    Amy Blevins Rep   03/18/24 10:06 EDT

## 2024-03-19 RX ORDER — ALPRAZOLAM 0.25 MG/1
0.25 TABLET ORAL NIGHTLY PRN
Qty: 90 TABLET | Refills: 1 | Status: SHIPPED | OUTPATIENT
Start: 2024-03-19

## 2024-04-10 RX ORDER — APIXABAN 2.5 MG/1
2.5 TABLET, FILM COATED ORAL 2 TIMES DAILY
Qty: 180 TABLET | Refills: 1 | Status: SHIPPED | OUTPATIENT
Start: 2024-04-10

## 2024-04-10 RX ORDER — METOPROLOL TARTRATE 50 MG/1
50 TABLET, FILM COATED ORAL 2 TIMES DAILY
Qty: 180 TABLET | Refills: 1 | Status: SHIPPED | OUTPATIENT
Start: 2024-04-10

## 2024-05-09 ENCOUNTER — TELEPHONE (OUTPATIENT)
Dept: INTERNAL MEDICINE | Facility: CLINIC | Age: 89
End: 2024-05-09

## 2024-05-09 NOTE — TELEPHONE ENCOUNTER
Caller: MIMI LANDAVERDE    Relationship: Emergency Contact    Best call back number: [phone]    What orders are you requesting (i.e. lab or imaging): LABS    In what timeframe would the patient need to come in: PRIOR TO VISIT    Where will you receive your lab/imaging services: IN OFFICE    Additional notes: STATED THAT THEY WOULD LIKE TO SEE ABOUT GETTING THE LABS DONE LIKE THEY NORMALLY DO BEFORE THEIR FOLLOW UP SCHEDULED IN JULY. PLEASE CALL AND ADVISE

## 2024-05-10 ENCOUNTER — OFFICE VISIT (OUTPATIENT)
Dept: INTERNAL MEDICINE | Facility: CLINIC | Age: 89
End: 2024-05-10
Payer: MEDICARE

## 2024-05-10 VITALS
TEMPERATURE: 98.1 F | DIASTOLIC BLOOD PRESSURE: 62 MMHG | HEIGHT: 60 IN | HEART RATE: 78 BPM | SYSTOLIC BLOOD PRESSURE: 136 MMHG | OXYGEN SATURATION: 97 % | BODY MASS INDEX: 28.61 KG/M2 | WEIGHT: 145.7 LBS

## 2024-05-10 DIAGNOSIS — G47.19 EXCESSIVE DAYTIME SLEEPINESS: ICD-10-CM

## 2024-05-10 DIAGNOSIS — R06.83 SNORING: ICD-10-CM

## 2024-05-10 DIAGNOSIS — I10 BENIGN ESSENTIAL HYPERTENSION: Primary | ICD-10-CM

## 2024-05-10 PROCEDURE — 1159F MED LIST DOCD IN RCRD: CPT | Performed by: NURSE PRACTITIONER

## 2024-05-10 PROCEDURE — 99214 OFFICE O/P EST MOD 30 MIN: CPT | Performed by: NURSE PRACTITIONER

## 2024-05-10 PROCEDURE — 1160F RVW MEDS BY RX/DR IN RCRD: CPT | Performed by: NURSE PRACTITIONER

## 2024-05-10 RX ORDER — AMLODIPINE BESYLATE 2.5 MG/1
7.5 TABLET ORAL DAILY
Qty: 90 TABLET | Refills: 5 | Status: SHIPPED | OUTPATIENT
Start: 2024-05-10

## 2024-05-10 RX ORDER — BENZONATATE 100 MG/1
100 CAPSULE ORAL 3 TIMES DAILY PRN
Qty: 30 CAPSULE | Refills: 0 | Status: SHIPPED | OUTPATIENT
Start: 2024-05-10

## 2024-05-13 ENCOUNTER — TELEPHONE (OUTPATIENT)
Dept: INTERNAL MEDICINE | Facility: CLINIC | Age: 89
End: 2024-05-13
Payer: MEDICARE

## 2024-05-13 DIAGNOSIS — I10 BENIGN ESSENTIAL HYPERTENSION: ICD-10-CM

## 2024-05-13 RX ORDER — AMLODIPINE BESYLATE 5 MG/1
5 TABLET ORAL DAILY
Qty: 30 TABLET | Refills: 0 | Status: SHIPPED | OUTPATIENT
Start: 2024-05-13

## 2024-05-13 RX ORDER — METOPROLOL TARTRATE 50 MG/1
50 TABLET, FILM COATED ORAL 2 TIMES DAILY
Start: 2024-05-13

## 2024-05-13 RX ORDER — METOPROLOL TARTRATE 50 MG/1
50 TABLET, FILM COATED ORAL 2 TIMES DAILY
Qty: 60 TABLET | Refills: 0 | Status: SHIPPED | OUTPATIENT
Start: 2024-05-13

## 2024-06-03 DIAGNOSIS — I10 BENIGN ESSENTIAL HYPERTENSION: ICD-10-CM

## 2024-06-03 RX ORDER — AMLODIPINE BESYLATE 2.5 MG/1
7.5 TABLET ORAL DAILY
Qty: 90 TABLET | Refills: 5 | Status: SHIPPED | OUTPATIENT
Start: 2024-06-03

## 2024-06-03 RX ORDER — AMLODIPINE BESYLATE 5 MG/1
5 TABLET ORAL DAILY
Qty: 30 TABLET | Refills: 0 | OUTPATIENT
Start: 2024-06-03

## 2024-06-11 RX ORDER — AMLODIPINE BESYLATE 5 MG/1
5 TABLET ORAL DAILY
Qty: 30 TABLET | Refills: 0 | OUTPATIENT
Start: 2024-06-11

## 2024-06-21 DIAGNOSIS — I10 BENIGN ESSENTIAL HYPERTENSION: ICD-10-CM

## 2024-06-21 RX ORDER — METOPROLOL TARTRATE 50 MG/1
50 TABLET, FILM COATED ORAL 2 TIMES DAILY
Qty: 180 TABLET | Refills: 0 | Status: SHIPPED | OUTPATIENT
Start: 2024-06-21

## 2024-06-25 RX ORDER — ATORVASTATIN CALCIUM 20 MG/1
20 TABLET, FILM COATED ORAL DAILY
Qty: 90 TABLET | Refills: 1 | OUTPATIENT
Start: 2024-06-25

## 2024-07-01 ENCOUNTER — TELEPHONE (OUTPATIENT)
Dept: INTERNAL MEDICINE | Facility: CLINIC | Age: 89
End: 2024-07-01
Payer: MEDICARE

## 2024-07-01 NOTE — TELEPHONE ENCOUNTER
Daughter aware that she has seen Dr Cindy brandon and she will call them to get schedule for rt shoulder injections.

## 2024-07-01 NOTE — TELEPHONE ENCOUNTER
Caller: MIMI LANDAVERDE    Relationship: Emergency Contact    Best call back number: [phone]     What is the medical concern/diagnosis:      What specialty or service is being requested:      What is the provider, practice or medical service name:      What is the office location:      What is the office phone number:      Any additional details: PATIENT DAUGHTER MIMI CALLED STATED THAT THE PATIENT HAVING PAIN IN LEFT SHOULDER AGAIN AND WANTS TO KNOW IF ELSA ANDERSON CAN SEND HER AGAIN TO GET THE INJECTION IN HER LEFT SHOULDER.  PLEASE LET HER KNOW.

## 2024-07-08 RX ORDER — OLMESARTAN MEDOXOMIL 40 MG/1
40 TABLET ORAL DAILY
Qty: 90 TABLET | Refills: 1 | OUTPATIENT
Start: 2024-07-08

## 2024-07-11 ENCOUNTER — TELEPHONE (OUTPATIENT)
Dept: INTERNAL MEDICINE | Facility: CLINIC | Age: 89
End: 2024-07-11
Payer: MEDICARE

## 2024-07-11 ENCOUNTER — OFFICE VISIT (OUTPATIENT)
Dept: ORTHOPEDIC SURGERY | Facility: CLINIC | Age: 89
End: 2024-07-11
Payer: MEDICARE

## 2024-07-11 VITALS — TEMPERATURE: 98.4 F | BODY MASS INDEX: 28.53 KG/M2 | WEIGHT: 145.3 LBS | HEIGHT: 60 IN

## 2024-07-11 DIAGNOSIS — M25.511 RIGHT SHOULDER PAIN, UNSPECIFIED CHRONICITY: ICD-10-CM

## 2024-07-11 DIAGNOSIS — M75.41 IMPINGEMENT SYNDROME OF RIGHT SHOULDER: Primary | ICD-10-CM

## 2024-07-11 RX ADMIN — LIDOCAINE HYDROCHLORIDE 2 ML: 10 INJECTION, SOLUTION EPIDURAL; INFILTRATION; INTRACAUDAL; PERINEURAL at 14:02

## 2024-07-11 RX ADMIN — METHYLPREDNISOLONE ACETATE 80 MG: 80 INJECTION, SUSPENSION INTRA-ARTICULAR; INTRALESIONAL; INTRAMUSCULAR; SOFT TISSUE at 14:02

## 2024-07-11 NOTE — TELEPHONE ENCOUNTER
Caller: CATRACHITA BAEZ    Relationship: Other    What was the call regarding: PATIENTS DAUGHTER CALLING WANTING TO KNOW IF SHE CAN GET FMLA PAPERWORK FILLED OUT FOR HER MOTHER

## 2024-07-11 NOTE — PROGRESS NOTES
[facility]  New Problem      Patient Name: Norma Trejo  : 1930  Primary Care Physician: Sharon Parry APRN        Chief Complaint: Right shoulder pain    HPI:   Norma Trejo is a 89 y.o. year old who presents today for evaluation.  Patient is here today with reports of recurrent right shoulder pain.  She saw Dr. Daniels about a year ago who felt her symptoms were rotator cuff pathology and they did a subacromial injection and the patient states that this really helped her symptoms for quite some time.  She is noticed that her pain started to return recently and is here today with her family.  The patient is [place]-speaking and the family wishes to interpret for her.  We are able to communicate very well and I think this is more than reasonable for the patient's preference and comfort.    She stated that her pain really is in the shoulder and upper arm started coming back about few months ago.  Bothers her at night worse with overhead activities.      Past Medical/Surgical, Social and Family History:  I have reviewed and/or updated pertinent history as noted in the medical record including:  Past Medical History:   Diagnosis Date    Depression      History reviewed. No pertinent surgical history.  Social History     Occupational History    Not on file   Tobacco Use    Smoking status: Never    Smokeless tobacco: Never   Vaping Use    Vaping status: Never Used   Substance and Sexual Activity    Alcohol use: Not Currently    Drug use: Never    Sexual activity: Not Currently          Allergies: No Known Allergies    Medications:   Home Medications:  Current Outpatient Medications on File Prior to Visit   Medication Sig    albuterol sulfate  (90 Base) MCG/ACT inhaler Inhale 2 puffs Every 4 (Four) Hours As Needed for Wheezing.    ALPRAZolam (XANAX) 0.25 MG tablet TAKE ONE TABLET BY MOUTH ONCE NIGHTLY AS NEEDED FOR ANXIETY    amLODIPine (NORVASC) 2.5 MG tablet Take 3 tablets by mouth  Daily.    aspirin 81 MG EC tablet Daily.    atorvastatin (LIPITOR) 20 MG tablet Take 1 tablet by mouth Daily.    cholecalciferol (VITAMIN D3) 25 MCG (1000 UT) tablet Take 1 tablet by mouth Daily.    Eliquis 2.5 MG tablet tablet TAKE 1 TABLET BY MOUTH TWICE A DAY    hydroCHLOROthiazide 12.5 MG tablet TAKE 1 TABLET BY MOUTH DAILY    metoprolol tartrate (LOPRESSOR) 50 MG tablet Take 1 tablet by mouth 2 (Two) Times a Day.    pantoprazole (PROTONIX) 40 MG EC tablet Take 1 tablet by mouth Daily.    prednisoLONE acetate (PRED FORTE) 1 % ophthalmic suspension     sertraline (Zoloft) 100 MG tablet Take 1 tablet by mouth Daily. Take with 50 mg tablet for a total of 150 mg daily.    vitamin B-12 (CYANOCOBALAMIN) 100 MCG tablet Take 0.5 tablets by mouth Daily.    benzonatate (Tessalon Perles) 100 MG capsule Take 1 capsule by mouth 3 (Three) Times a Day As Needed for Cough. (Patient not taking: Reported on 7/11/2024)    guaiFENesin (MUCINEX) 600 MG 12 hr tablet Take 2 tablets by mouth 2 (Two) Times a Day. (Patient not taking: Reported on 7/11/2024)    [DISCONTINUED] metoprolol tartrate (LOPRESSOR) 50 MG tablet Take 1 tablet by mouth 2 (Two) Times a Day. (Patient not taking: Reported on 7/11/2024)     No current facility-administered medications on file prior to visit.         ROS:  14 point review of systems was negative except as listed in the HPI.    Physical Exam:   89 y.o. female  Body mass index is 28.38 kg/m²., 65.9 kg (145 lb 4.8 oz)  Vitals:    07/11/24 1330   Temp: 98.4 °F (36.9 °C)     General: Alert, cooperative, appears well and in no observable distress. Appears stated age and BMI as listed above.  HEENT: Normocephalic, atraumatic on external visual inspection.  CV: No significant peripheral edema.  Respiratory: Normal respiratory effort.  Skin: Warm & well perfused; appropriate skin turgor.  Psych: Appropriate mood & affect.  Neuro: Gross sensation and motor intact in affected extremity/extremities.  Vascular:  Peripheral pulses palpable in affected extremity/extremities.     MSK Exam:    Right Shoulder  No obvious deformities or wounds.   No redness or significant swelling.  +Definitive painful arc.  +Impingement signs  +Neer Test  +Hawkin's Sign  Flexion 170  ER 50  IR lumbar spine  Strength is 4/5 strength with isometric testing of the rotator cuff and painful    Left Shoulder  No deformity or wounds.  No redness or swelling.  No tenderness to palpation.  Full, painless AROM.  Cuff Strength 4+ to 5/5 with isometric testing of the rotator cuff.  Negative provocative testing.    Brief examination of the cervical spine did not reproduce any specific radicular pattern or symptoms.   Strength is reasonable and symmetric in the upper extremities.      Radiology:    The following X-rays were ordered/reviewed today to evaluate the patient's symptoms: Shoulder: AP, Scapular Y and Axillary Lateral of right shoulder(s) show some mild AC joint pathology otherwise no acute or chronic bonty pathology. Compared with prior films no significant changes since last year.    Procedure:   See Procedure Note: The potential risks and benefits of performing a diagnostic and therapeutic injection were discussed with the patient prior to procedure. Risks include, but are not limited to infection, swelling, transient increase in pain, bleeding, bruising. Patient was advised that injections are a diagnostic and therapeutic tool meaning they may not alleviate symptoms at all, or may only provide partial or temporary relief. Injection precautions and aftercare discussed. and Patient is currently on anticoagulation and/or a blood thinning agent which poses an increased risk of more significant bleeding or bruising following an injection. While joint injections are generally well tolerated even on AC, the patient was counseled on this increased risk and advised to monitor for signs of bleeding with proper follow up instructions. Injection precautions  and aftercare discussed.    [facility] [place] Data/Labs: N/A    Assessment & Plan:    ICD-10-CM ICD-9-CM   1. Impingement syndrome of right shoulder  M75.41 726.2   2. Right shoulder pain, unspecified chronicity  M25.511 719.41     No orders of the defined types were placed in this encounter.    Orders Placed This Encounter   Procedures    Large Joint Arthrocentesis: R subacromial bursa    XR Shoulder 2+ View Right     This is a 89-year-old female with recurrent right shoulder pain.  I agree with Dr. Daniels that this is rotator cuff pathology and I do not appreciate any new symptoms or findings on x-ray or exam.  Obviously at 89 she is not interested in major elective surgery and she got such great relief with an injection I think another 1 is more than reasonable.  I talked with her and her family about possibilities going forward we could inject this again as soon as 3 months or when her pain returns she certainly does not have to wait an entire year if she finds the injections helpful.    Return in about 3 months (around 10/11/2024), or if symptoms worsen or fail to improve.    Patient encouraged to call with questions or concerns prior to follow up.  Recommend ICE and/or HEAT PRN as discussed.  Will discuss with attending physician as needed.  Consider additional referrals, work up and/or advanced imaging as indicated or if patient fails to respond to conservative care.        Large Joint Arthrocentesis: R subacromial bursa  Date/Time: 7/11/2024 2:02 PM  Consent given by: patient  Site marked: site marked  Timeout: Immediately prior to procedure a time out was called to verify the correct patient, procedure, equipment, support staff and site/side marked as required   Supporting Documentation  Indications: pain   Procedure Details  Location: shoulder - R subacromial bursa  Preparation: Patient was prepped and draped in the usual sterile fashion  Needle gauge: 21G.  Approach: posterior  Medications administered: 80 mg  methylPREDNISolone acetate 80 MG/ML; 2 mL lidocaine PF 1% 1 %  Patient tolerance: patient tolerated the procedure well with no immediate complications           David Loyola, APRN

## 2024-07-12 RX ORDER — LIDOCAINE HYDROCHLORIDE 10 MG/ML
2 INJECTION, SOLUTION EPIDURAL; INFILTRATION; INTRACAUDAL; PERINEURAL
Status: COMPLETED | OUTPATIENT
Start: 2024-07-11 | End: 2024-07-11

## 2024-07-12 RX ORDER — METHYLPREDNISOLONE ACETATE 80 MG/ML
80 INJECTION, SUSPENSION INTRA-ARTICULAR; INTRALESIONAL; INTRAMUSCULAR; SOFT TISSUE
Status: COMPLETED | OUTPATIENT
Start: 2024-07-11 | End: 2024-07-11

## 2024-07-16 DIAGNOSIS — I10 BENIGN ESSENTIAL HYPERTENSION: ICD-10-CM

## 2024-07-16 RX ORDER — AMLODIPINE BESYLATE 2.5 MG/1
2.5 TABLET ORAL DAILY
Qty: 90 TABLET | Refills: 5 | Status: SHIPPED | OUTPATIENT
Start: 2024-07-16

## 2024-07-24 DIAGNOSIS — I10 BENIGN ESSENTIAL HYPERTENSION: Primary | ICD-10-CM

## 2024-07-24 LAB
ALBUMIN SERPL-MCNC: 4.1 G/DL (ref 3.5–5.2)
ALBUMIN/GLOB SERPL: 1.7 G/DL
ALP SERPL-CCNC: 67 U/L (ref 39–117)
ALT SERPL-CCNC: 45 U/L (ref 1–33)
AST SERPL-CCNC: 29 U/L (ref 1–32)
BASOPHILS # BLD AUTO: 0.03 10*3/MM3 (ref 0–0.2)
BASOPHILS NFR BLD AUTO: 0.4 % (ref 0–1.5)
BILIRUB SERPL-MCNC: 0.9 MG/DL (ref 0–1.2)
BUN SERPL-MCNC: 22 MG/DL (ref 8–23)
BUN/CREAT SERPL: 17.9 (ref 7–25)
CALCIUM SERPL-MCNC: 9.4 MG/DL (ref 8.2–9.6)
CHLORIDE SERPL-SCNC: 103 MMOL/L (ref 98–107)
CO2 SERPL-SCNC: 27.9 MMOL/L (ref 22–29)
CREAT SERPL-MCNC: 1.23 MG/DL (ref 0.57–1)
EGFRCR SERPLBLD CKD-EPI 2021: 40.8 ML/MIN/1.73
EOSINOPHIL # BLD AUTO: 0.1 10*3/MM3 (ref 0–0.4)
EOSINOPHIL NFR BLD AUTO: 1.4 % (ref 0.3–6.2)
ERYTHROCYTE [DISTWIDTH] IN BLOOD BY AUTOMATED COUNT: 13 % (ref 12.3–15.4)
GLOBULIN SER CALC-MCNC: 2.4 GM/DL
GLUCOSE SERPL-MCNC: 97 MG/DL (ref 65–99)
HCT VFR BLD AUTO: 41 % (ref 34–46.6)
HGB BLD-MCNC: 13.6 G/DL (ref 12–15.9)
IMM GRANULOCYTES # BLD AUTO: 0.03 10*3/MM3 (ref 0–0.05)
IMM GRANULOCYTES NFR BLD AUTO: 0.4 % (ref 0–0.5)
LYMPHOCYTES # BLD AUTO: 2.34 10*3/MM3 (ref 0.7–3.1)
LYMPHOCYTES NFR BLD AUTO: 32.3 % (ref 19.6–45.3)
MCH RBC QN AUTO: 31 PG (ref 26.6–33)
MCHC RBC AUTO-ENTMCNC: 33.2 G/DL (ref 31.5–35.7)
MCV RBC AUTO: 93.4 FL (ref 79–97)
MONOCYTES # BLD AUTO: 0.69 10*3/MM3 (ref 0.1–0.9)
MONOCYTES NFR BLD AUTO: 9.5 % (ref 5–12)
NEUTROPHILS # BLD AUTO: 4.06 10*3/MM3 (ref 1.7–7)
NEUTROPHILS NFR BLD AUTO: 56 % (ref 42.7–76)
NRBC BLD AUTO-RTO: 0 /100 WBC (ref 0–0.2)
PLATELET # BLD AUTO: 215 10*3/MM3 (ref 140–450)
POTASSIUM SERPL-SCNC: 4.5 MMOL/L (ref 3.5–5.2)
PROT SERPL-MCNC: 6.5 G/DL (ref 6–8.5)
RBC # BLD AUTO: 4.39 10*6/MM3 (ref 3.77–5.28)
SODIUM SERPL-SCNC: 139 MMOL/L (ref 136–145)
WBC # BLD AUTO: 7.25 10*3/MM3 (ref 3.4–10.8)

## 2024-07-31 ENCOUNTER — OFFICE VISIT (OUTPATIENT)
Dept: INTERNAL MEDICINE | Facility: CLINIC | Age: 89
End: 2024-07-31
Payer: MEDICARE

## 2024-07-31 VITALS
HEIGHT: 60 IN | OXYGEN SATURATION: 96 % | HEART RATE: 70 BPM | TEMPERATURE: 97.9 F | BODY MASS INDEX: 28.35 KG/M2 | SYSTOLIC BLOOD PRESSURE: 136 MMHG | DIASTOLIC BLOOD PRESSURE: 68 MMHG | WEIGHT: 144.4 LBS

## 2024-07-31 DIAGNOSIS — I10 BENIGN ESSENTIAL HYPERTENSION: Primary | ICD-10-CM

## 2024-07-31 DIAGNOSIS — R74.8 ELEVATED LIVER ENZYMES: ICD-10-CM

## 2024-07-31 DIAGNOSIS — F41.8 ANXIETY WITH DEPRESSION: ICD-10-CM

## 2024-07-31 DIAGNOSIS — G44.52 NEW DAILY PERSISTENT HEADACHE: ICD-10-CM

## 2024-07-31 PROCEDURE — 1125F AMNT PAIN NOTED PAIN PRSNT: CPT | Performed by: NURSE PRACTITIONER

## 2024-07-31 PROCEDURE — 1159F MED LIST DOCD IN RCRD: CPT | Performed by: NURSE PRACTITIONER

## 2024-07-31 PROCEDURE — 1160F RVW MEDS BY RX/DR IN RCRD: CPT | Performed by: NURSE PRACTITIONER

## 2024-07-31 PROCEDURE — 99214 OFFICE O/P EST MOD 30 MIN: CPT | Performed by: NURSE PRACTITIONER

## 2024-07-31 NOTE — PROGRESS NOTES
Subjective   Norma Trejo is a 89 y.o. female.     Chief Complaint   Patient presents with    Hypertension    Anxiety    Depression        History of Present Illness   She is here today for follow-up and lab work.  She is accompanied by her daughter today to translate.  She notes some difficulty with balance recently.  She denies any falls at home, but notes she has occasional feeling of off balance.  She denies any upper or lower extremity weakness.  She also notes frequent headaches. She notes headaches, described and pressure and throbbing across the head. This typically occurs after being active. She uses tylenol and will rest with improvement. She denies any vision changes, aura, photophobia, phonophobia with this.  She denies any upper respiratory or allergy symptoms.  She notes blood pressure is stable when the headaches occur.  She does note frequent fatigue.  At last office visit a referral was placed to sleep medicine for evaluation of sleep apnea.  She has not yet scheduled this appointment.  She has a history of migraine headaches but feels these headaches are different from her migraines.    HTN- her BP has been stable, running less than 135/85. She is trying to hydrate well with fluids.  She is currently taking metoprolol 50 mg twice daily, amlodipine 2.5 mg daily and hydrochlorothiazide 12.5 mg daily.  She denies any chest pain, palpitations, shortness of breath or dizziness.    Anxiety with depression- she is currently taking sertraline 100 mg daily. She uses alprazolam nightly with improvement in anxiety and sleep.     The following portions of the patient's history were reviewed and updated as appropriate: allergies, current medications, past family history, past medical history, past social history, past surgical history, and problem list.    Review of Systems   Constitutional:  Positive for fatigue. Negative for activity change, appetite change, chills and fever.   Respiratory: Negative.      Cardiovascular: Negative.    Musculoskeletal: Negative.    Neurological:  Positive for headache. Negative for dizziness, tremors, seizures, syncope, facial asymmetry, speech difficulty, weakness, light-headedness, numbness, memory problem and confusion.        Balance difficulty   Psychiatric/Behavioral: Negative.         Objective   Physical Exam  Constitutional:       Appearance: She is well-developed.   Neck:      Thyroid: No thyroid mass, thyromegaly or thyroid tenderness.      Vascular: No carotid bruit.      Trachea: Trachea normal.   Cardiovascular:      Rate and Rhythm: Normal rate and regular rhythm.      Chest Wall: PMI is not displaced.      Pulses:           Radial pulses are 2+ on the right side and 2+ on the left side.        Dorsalis pedis pulses are 2+ on the right side and 2+ on the left side.        Posterior tibial pulses are 2+ on the right side and 2+ on the left side.      Heart sounds: S1 normal and S2 normal.   Pulmonary:      Effort: Pulmonary effort is normal.      Breath sounds: Normal breath sounds.   Musculoskeletal:      Right lower leg: No edema.      Left lower leg: No edema.   Lymphadenopathy:      Head:      Right side of head: No submental, submandibular, tonsillar or occipital adenopathy.      Left side of head: No submental, submandibular, tonsillar or occipital adenopathy.      Cervical: No cervical adenopathy.   Skin:     General: Skin is warm and dry.      Capillary Refill: Capillary refill takes less than 2 seconds.      Nails: There is no clubbing.   Neurological:      Mental Status: She is alert and oriented to person, place, and time.      Cranial Nerves: Cranial nerves 2-12 are intact.      Sensory: Sensation is intact.      Motor: Motor function is intact.      Coordination: Coordination is intact.      Gait: Gait abnormal (needs assistance with ambulation).   Psychiatric:         Attention and Perception: Attention normal.         Mood and Affect: Mood and affect  normal.         Speech: Speech normal.         Behavior: Behavior normal.         Thought Content: Thought content normal.         Cognition and Memory: Cognition normal.         Vitals:    07/31/24 1506   BP: 136/68   Pulse: 70   Temp: 97.9 °F (36.6 °C)   SpO2: 96%      Body mass index is 28.2 kg/m².    Assessment & Plan   Problems Addressed this Visit       Benign essential hypertension - Primary    Anxiety with depression     Other Visit Diagnoses       New daily persistent headache        Elevated liver enzymes              Diagnoses         Codes Comments    Benign essential hypertension    -  Primary ICD-10-CM: I10  ICD-9-CM: 401.1     Anxiety with depression     ICD-10-CM: F41.8  ICD-9-CM: 300.4     New daily persistent headache     ICD-10-CM: G44.52  ICD-9-CM: 339.42     Elevated liver enzymes     ICD-10-CM: R74.8  ICD-9-CM: 790.5           Lab results discussed with patient and family today in the office.    1.  HTN-stable.  Recommend continue medications at current doses.  Continue to monitor BP at home with goal BP less than 135/85.  Kidney function mildly decreased.  Emphasized the importance of her hydrating well with fluids during the day and avoiding NSAIDs.  2.  Anxiety with depression-stable.  Discussed with her and her family to be careful with use of alprazolam secondary to risk of falls.  3.  New daily headache-this is different than her migraine headaches.  Recommend that she discuss this further with Dr. Garcia next week.  Discussed with patient and family recommendations to see sleep medicine for further evaluation for possible VANCE as this could be contributing to symptoms.  Okay to continue using Tylenol and rest when headaches occur.  To ER with any acute neurologic changes.  4.  Elevated liver enzymes-mildly elevated.  Recommend rechecking liver function in 6 months.

## 2024-08-07 ENCOUNTER — OFFICE VISIT (OUTPATIENT)
Dept: NEUROLOGY | Facility: CLINIC | Age: 89
End: 2024-08-07
Payer: MEDICARE

## 2024-08-07 VITALS
HEART RATE: 63 BPM | DIASTOLIC BLOOD PRESSURE: 72 MMHG | OXYGEN SATURATION: 96 % | HEIGHT: 60 IN | WEIGHT: 143 LBS | SYSTOLIC BLOOD PRESSURE: 122 MMHG | BODY MASS INDEX: 28.07 KG/M2

## 2024-08-07 DIAGNOSIS — M54.81 BILATERAL OCCIPITAL NEURALGIA: ICD-10-CM

## 2024-08-07 DIAGNOSIS — F33.0 MILD RECURRENT MAJOR DEPRESSION: Primary | ICD-10-CM

## 2024-08-07 DIAGNOSIS — R51.9 CHRONIC DAILY HEADACHE: ICD-10-CM

## 2024-08-07 DIAGNOSIS — G47.00 INSOMNIA, UNSPECIFIED TYPE: ICD-10-CM

## 2024-08-07 PROCEDURE — 1160F RVW MEDS BY RX/DR IN RCRD: CPT | Performed by: PSYCHIATRY & NEUROLOGY

## 2024-08-07 PROCEDURE — 99213 OFFICE O/P EST LOW 20 MIN: CPT | Performed by: PSYCHIATRY & NEUROLOGY

## 2024-08-07 PROCEDURE — 1159F MED LIST DOCD IN RCRD: CPT | Performed by: PSYCHIATRY & NEUROLOGY

## 2024-08-07 RX ORDER — AMITRIPTYLINE HYDROCHLORIDE 25 MG/1
25 TABLET, FILM COATED ORAL NIGHTLY
Qty: 30 TABLET | Refills: 5 | Status: SHIPPED | OUTPATIENT
Start: 2024-08-07 | End: 2024-09-06

## 2024-08-07 NOTE — PROGRESS NOTES
Chief Complaint   Patient presents with    Migraine    Headache       Patient ID: Norma Trejo is a 89 y.o. female.    HPI: I have had the pleasure of seeing your patient today.  As you may know she is a 89-year-old female here for the management of chronic headaches.  She also has issues with insomnia.  She has a lot of trouble sleeping at night.  Falling asleep is the main issue.  Typically once she is asleep she can stay asleep.  She has not had any new symptoms neurologically.  Her headaches do not appear to be any better.  She continues to have headache daily.  This typically consists of head pain that radiates from the back of her head into both posterior head regions symmetrically.  She typically does not have much sensitivity to light or sound with her headache.  She may have some nausea.  No vomiting.  Currently she uses over-the-counter medication abortively.  She does use Xanax for sleep.  She also takes sertraline.    The following portions of the patient's history were reviewed and updated as appropriate: allergies, current medications, past family history, past medical history, past social history, past surgical history and problem list.    Review of Systems   Constitutional:  Positive for activity change and fatigue.   Musculoskeletal:  Positive for gait problem.   Neurological:  Positive for headaches. Negative for dizziness, tremors, seizures, syncope, facial asymmetry, speech difficulty, weakness, light-headedness and numbness.   Psychiatric/Behavioral:  Negative for agitation, behavioral problems, confusion, decreased concentration, dysphoric mood, hallucinations, self-injury, sleep disturbance and suicidal ideas. The patient is not nervous/anxious and is not hyperactive.       I have reviewed the review of systems above performed by my medical assistant.      Vitals:    08/07/24 1351   BP: 122/72   Pulse: 63   SpO2: 96%       Neurologic Exam     Mental Status   Oriented to person.    Oriented to place. Oriented to country.   Concentration: normal.   Level of consciousness: alert  Knowledge: good.     Cranial Nerves     CN II   Visual fields full to confrontation.     CN III, IV, VI   Pupils are equal, round, and reactive to light.  Extraocular motions are normal.   CN III: no CN III palsy  CN VI: no CN VI palsy    CN V   Facial sensation intact.     CN VII   Facial expression full, symmetric.     CN VIII   CN VIII normal.     CN IX, X   CN IX normal.   CN X normal.     CN XI   CN XI normal.     CN XII   CN XII normal.     Motor Exam     Strength   Right neck flexion: 5/5  Left neck flexion: 5/5  Right neck extension: 5/5  Left neck extension: 5/5  Right deltoid: 5/5  Left deltoid: 5/5  Right biceps: 5/5  Left biceps: 5/5  Right triceps: 5/5  Left triceps: 5/5  Right wrist flexion: 5/5  Left wrist flexion: 5/5  Right wrist extension: 5/5  Left wrist extension: 5/5  Right interossei: 5/5  Left interossei: 5/5  Right abdominals: 5/5  Left abdominals: 5/5  Right iliopsoas: 5/5  Left iliopsoas: 5/5  Right quadriceps: 5/5  Left quadriceps: 5/5  Right hamstrin/5  Left hamstrin/5  Right glutei: 5/5  Left glutei: 5/5  Right anterior tibial: 5/5  Left anterior tibial: 5/5  Right posterior tibial: 5/5  Left posterior tibial: 5/5  Right peroneal: 5/5  Left peroneal: 5/5  Right gastroc: 5/5  Left gastroc: 5/5    Sensory Exam   Light touch normal.   Vibration normal.     Gait, Coordination, and Reflexes     Gait  Gait: normal    Reflexes   Right brachioradialis: 2+  Left brachioradialis: 2+  Right biceps: 2+  Left biceps: 2+  Right triceps: 2+  Left triceps: 2+  Right patellar: 2+  Left patellar: 2+  Right achilles: 2+  Left achilles: 2+  Right : 2+  Left : 2+Station is normal.       Physical Exam  Vitals reviewed.   Constitutional:       Appearance: She is well-developed.   HENT:      Head: Normocephalic and atraumatic.   Eyes:      Extraocular Movements: EOM normal.      Pupils: Pupils are  equal, round, and reactive to light.   Cardiovascular:      Rate and Rhythm: Normal rate and regular rhythm.   Pulmonary:      Breath sounds: Normal breath sounds.   Musculoskeletal:         General: Normal range of motion.   Skin:     General: Skin is warm.   Neurological:      Gait: Gait is intact.      Deep Tendon Reflexes:      Reflex Scores:       Tricep reflexes are 2+ on the right side and 2+ on the left side.       Bicep reflexes are 2+ on the right side and 2+ on the left side.       Brachioradialis reflexes are 2+ on the right side and 2+ on the left side.       Patellar reflexes are 2+ on the right side and 2+ on the left side.       Achilles reflexes are 2+ on the right side and 2+ on the left side.        Procedures    Assessment/Plan: Given the symptomatology that sounds as though she is experiencing symptoms of occipital neuralgia.  Therefore would like to schedule her for an occipital nerve block to see if this will help.  This will be an occipital nerve block bilaterally.  We are also going to try amitriptyline to assist with the sleeping issue/insomnia.  I have given them weaning instructions with regards to sertraline.  A total of 30 minutes was spent face-to-face with the patient today.  Of that greater than 50% of this time was spent discussing signs and symptoms of chronic daily headache, occipital neuralgia, insomnia, depression, patient education, plan of care and prognosis.         Diagnoses and all orders for this visit:    1. Mild recurrent major depression (Primary)    2. Bilateral occipital neuralgia  -     amitriptyline (ELAVIL) 25 MG tablet; Take 1 tablet by mouth Every Night for 30 days.  Dispense: 30 tablet; Refill: 5    3. Chronic daily headache  -     amitriptyline (ELAVIL) 25 MG tablet; Take 1 tablet by mouth Every Night for 30 days.  Dispense: 30 tablet; Refill: 5    4. Insomnia, unspecified type  -     amitriptyline (ELAVIL) 25 MG tablet; Take 1 tablet by mouth Every Night for  30 days.  Dispense: 30 tablet; Refill: 5           Jeremiah Garcia II, MD

## 2024-08-29 ENCOUNTER — PROCEDURE VISIT (OUTPATIENT)
Dept: NEUROLOGY | Facility: CLINIC | Age: 89
End: 2024-08-29
Payer: MEDICARE

## 2024-08-29 DIAGNOSIS — M54.81 BILATERAL OCCIPITAL NEURALGIA: Primary | ICD-10-CM

## 2024-08-29 RX ORDER — METHYLPREDNISOLONE ACETATE 40 MG/ML
40 INJECTION, SUSPENSION INTRA-ARTICULAR; INTRALESIONAL; INTRAMUSCULAR; SOFT TISSUE ONCE
Status: COMPLETED | OUTPATIENT
Start: 2024-08-29 | End: 2024-08-29

## 2024-08-29 RX ORDER — LIDOCAINE HYDROCHLORIDE 20 MG/ML
1 INJECTION, SOLUTION INFILTRATION; PERINEURAL ONCE
Status: COMPLETED | OUTPATIENT
Start: 2024-08-29 | End: 2024-08-29

## 2024-08-29 RX ADMIN — METHYLPREDNISOLONE ACETATE 40 MG: 40 INJECTION, SUSPENSION INTRA-ARTICULAR; INTRALESIONAL; INTRAMUSCULAR; SOFT TISSUE at 09:45

## 2024-08-29 RX ADMIN — LIDOCAINE HYDROCHLORIDE 1 ML: 20 INJECTION, SOLUTION INFILTRATION; PERINEURAL at 09:45

## 2024-09-06 RX ORDER — MECLIZINE HCL 12.5 MG 12.5 MG/1
12.5 TABLET ORAL 3 TIMES DAILY PRN
Qty: 30 TABLET | Refills: 0 | Status: SHIPPED | OUTPATIENT
Start: 2024-09-06 | End: 2024-09-16

## 2024-09-06 NOTE — TELEPHONE ENCOUNTER
aller: MIMI LANDAVERDE    Relationship: Emergency Contact    Best call back number:     [phone] (Home)       Requested Prescriptions:   Requested Prescriptions     Pending Prescriptions Disp Refills    meclizine (ANTIVERT) 12.5 MG tablet       Sig: Take 1 tablet by mouth.        Pharmacy where request should be sent: [facility] [id number] [id number] [facility] AT [facility] & [facility] [facility] [phone] [facility] [phone]      Last office visit with prescribing clinician: 7/31/2024   Last telemedicine visit with prescribing clinician: Visit date not found   Next office visit with prescribing clinician: Visit date not found     Additional details provided by patient: COMPLETELY OUT  - TAKES AS NEEDED     Does the patient have less than a 3 day supply:  [x] Yes  [] No    Would you like a call back once the refill request has been completed: [] Yes [x] No    If the office needs to give you a call back, can they leave a voicemail: [] Yes [x] No    Amy Das Rep   09/06/24 10:21 EDT

## 2024-09-11 DIAGNOSIS — R05.3 CHRONIC COUGH: ICD-10-CM

## 2024-09-11 RX ORDER — PANTOPRAZOLE SODIUM 40 MG/1
40 TABLET, DELAYED RELEASE ORAL DAILY
Qty: 90 TABLET | Refills: 3 | OUTPATIENT
Start: 2024-09-11

## 2024-09-17 RX ORDER — ATORVASTATIN CALCIUM 20 MG/1
20 TABLET, FILM COATED ORAL DAILY
Qty: 90 TABLET | Refills: 1 | Status: SHIPPED | OUTPATIENT
Start: 2024-09-17

## 2024-09-18 RX ORDER — ATORVASTATIN CALCIUM 20 MG/1
20 TABLET, FILM COATED ORAL DAILY
Qty: 90 TABLET | Refills: 1 | OUTPATIENT
Start: 2024-09-18

## 2024-09-24 ENCOUNTER — TELEPHONE (OUTPATIENT)
Dept: NEUROLOGY | Facility: CLINIC | Age: 89
End: 2024-09-24
Payer: MEDICARE

## 2024-10-04 RX ORDER — HYDROCHLOROTHIAZIDE 12.5 MG/1
12.5 TABLET ORAL DAILY
Qty: 90 TABLET | Refills: 1 | Status: SHIPPED | OUTPATIENT
Start: 2024-10-04 | End: 2027-08-14

## 2024-10-08 RX ORDER — APIXABAN 2.5 MG/1
2.5 TABLET, FILM COATED ORAL 2 TIMES DAILY
Qty: 180 TABLET | Refills: 1 | Status: ON HOLD | OUTPATIENT
Start: 2024-10-08

## 2024-10-09 ENCOUNTER — OFFICE VISIT (OUTPATIENT)
Dept: INTERNAL MEDICINE | Facility: CLINIC | Age: 89
End: 2024-10-09
Payer: MEDICARE

## 2024-10-09 VITALS
HEIGHT: 60 IN | DIASTOLIC BLOOD PRESSURE: 72 MMHG | SYSTOLIC BLOOD PRESSURE: 122 MMHG | BODY MASS INDEX: 28.66 KG/M2 | WEIGHT: 146 LBS | TEMPERATURE: 98.4 F | HEART RATE: 74 BPM | OXYGEN SATURATION: 95 %

## 2024-10-09 DIAGNOSIS — R13.10 DYSPHAGIA, UNSPECIFIED TYPE: ICD-10-CM

## 2024-10-09 DIAGNOSIS — K21.9 GASTROESOPHAGEAL REFLUX DISEASE, UNSPECIFIED WHETHER ESOPHAGITIS PRESENT: Primary | ICD-10-CM

## 2024-10-09 DIAGNOSIS — R05.3 CHRONIC COUGH: ICD-10-CM

## 2024-10-09 PROCEDURE — 99214 OFFICE O/P EST MOD 30 MIN: CPT | Performed by: NURSE PRACTITIONER

## 2024-10-09 PROCEDURE — 1125F AMNT PAIN NOTED PAIN PRSNT: CPT | Performed by: NURSE PRACTITIONER

## 2024-10-09 PROCEDURE — 1160F RVW MEDS BY RX/DR IN RCRD: CPT | Performed by: NURSE PRACTITIONER

## 2024-10-09 PROCEDURE — 1159F MED LIST DOCD IN RCRD: CPT | Performed by: NURSE PRACTITIONER

## 2024-10-09 RX ORDER — VONOPRAZAN FUMARATE 13.36 MG/1
10 TABLET ORAL DAILY
Qty: 25 TABLET | Refills: 0 | Status: ON HOLD | COMMUNITY
Start: 2024-10-09

## 2024-10-09 NOTE — PROGRESS NOTES
Subjective   Norma Trejo is a 89 y.o. female.     Chief Complaint   Patient presents with    Cough     Pt is here as a UC/ER follow up after having strep and choked on medicine. Pt is feeling better but still have cough and dry mouth.        History of Present Illness   She is here today for urgent care and ER follow-up.  She initially presented to [facility] urgent care on 9/28 with complaints of sore throat.  She was diagnosed with strep pharyngitis and started on amoxicillin 500 mg twice a day for 10 days and was also prescribed Tessalon 200 mg 3 times daily as needed for cough.  She then presented to [place] ER on 10/3 after choking on a cough drop at home causing asphyxiation.  She was able to clear this but still noted discomfort in the throat.  A chest x-ray was performed showing right lateral lower lung 4 cm pleural density.  He was discharged from the ER to follow-up with PCP.  She presents today for follow up.  She is accompanied by her daughter Theresa today to translate.  She still notes chronic dry cough, worse at night. She notes worsening dry mouth. She notes improvement in sore throat. She has completed the amoxicillin and tessalon perles. She denies any choking, but does note some dysphagia when eating large bites of food. She denies any dysphagia to liquids.  She does note frequent pharyngitis and globus sensation.  She does note the dry cough has become more prevalent since transitioning to Elavil 25 mg nightly.   She has been trying to hydrate well with fluids and electrolyte water. She is using a mouth moisturizer. She denies any post nasal drainage, rhinorrhea, nasal congestion, SOB or wheezing. She is taking protonix 40 mg daily. She does note an increase in acid reflux recently, requiring additional omeprazole with mild improvement.  She notes occasional epigastric and right upper quadrant pain sometimes related to eating.  She denies any radiation in pain.  She has previously seen GI and  underwent an EGD [place].    The following portions of the patient's history were reviewed and updated as appropriate: allergies, current medications, past family history, past medical history, past social history, past surgical history, and problem list.    Review of Systems   Constitutional: Negative.    HENT:  Positive for sore throat and trouble swallowing. Negative for congestion, ear pain, postnasal drip and rhinorrhea.    Respiratory:  Positive for cough. Negative for chest tightness, shortness of breath and wheezing.    Cardiovascular: Negative.    Gastrointestinal:  Positive for abdominal pain and GERD. Negative for blood in stool, constipation, diarrhea, nausea and vomiting.   Psychiatric/Behavioral:  Positive for sleep disturbance (from coughing). Negative for suicidal ideas and depressed mood. The patient is not nervous/anxious.        Objective   Physical Exam  Constitutional:       Appearance: She is well-developed.   HENT:      Head: Normocephalic and atraumatic.      Nose: Nose normal.      Mouth/Throat:      Lips: Pink.      Mouth: Mucous membranes are moist. No injury or oral lesions.      Dentition: Normal dentition.      Tongue: No lesions. Tongue does not deviate from midline.      Palate: No mass and lesions.      Pharynx: Uvula midline. Posterior oropharyngeal erythema present. No pharyngeal swelling, oropharyngeal exudate or uvula swelling.   Neck:      Thyroid: No thyroid mass, thyromegaly or thyroid tenderness.      Vascular: No carotid bruit.      Trachea: Trachea normal.   Cardiovascular:      Rate and Rhythm: Normal rate and regular rhythm.      Chest Wall: PMI is not displaced.      Pulses:           Radial pulses are 2+ on the right side and 2+ on the left side.        Dorsalis pedis pulses are 2+ on the right side and 2+ on the left side.        Posterior tibial pulses are 2+ on the right side and 2+ on the left side.      Heart sounds: S1 normal and S2 normal.    Pulmonary:      Effort: Pulmonary effort is normal.      Breath sounds: Normal breath sounds.   Abdominal:      General: Bowel sounds are normal. There is no distension or abdominal bruit. There are no signs of injury.      Palpations: Abdomen is soft. There is no hepatomegaly or splenomegaly.      Tenderness: There is abdominal tenderness in the right upper quadrant and epigastric area. There is no guarding or rebound. Negative signs include Tristan's sign.      Hernia: No hernia is present.   Musculoskeletal:      Right lower leg: No edema.      Left lower leg: No edema.   Lymphadenopathy:      Head:      Right side of head: No submental, submandibular, tonsillar or occipital adenopathy.      Left side of head: No submental, submandibular, tonsillar or occipital adenopathy.      Cervical: No cervical adenopathy.   Skin:     General: Skin is warm and dry.      Capillary Refill: Capillary refill takes less than 2 seconds.      Nails: There is no clubbing.   Neurological:      Mental Status: She is alert and oriented to person, place, and time.   Psychiatric:         Attention and Perception: Attention normal.         Mood and Affect: Mood and affect normal.         Speech: Speech normal.         Behavior: Behavior normal.         Thought Content: Thought content normal.         Cognition and Memory: Cognition normal.         Vitals:    10/09/24 0824   BP: 122/72   Pulse: 74   Temp: 98.4 °F (36.9 °C)   SpO2: 95%      Body mass index is 28.51 kg/m².    Assessment & Plan   Problems Addressed this Visit       GERD (gastroesophageal reflux disease) - Primary    Relevant Medications    Vonoprazan Fumarate (Voquezna) 10 MG tablet    Other Relevant Orders    Ambulatory Referral to Gastroenterology     Other Visit Diagnoses       Chronic cough        Relevant Orders    Ambulatory Referral to Gastroenterology    Dysphagia, unspecified type        Relevant Orders    Ambulatory Referral to Gastroenterology          Diagnoses          Codes Comments    Gastroesophageal reflux disease, unspecified whether esophagitis present    -  Primary ICD-10-CM: K21.9  ICD-9-CM: 530.81     Chronic cough     ICD-10-CM: R05.3  ICD-9-CM: 786.2     Dysphagia, unspecified type     ICD-10-CM: R13.10  ICD-9-CM: 787.20           1.  Chronic cough/GERD/dysphagia-discussed with her and her daughter today that I feel chronic cough and frequent pharyngitis are related to reflux.  Will change from pantoprazole to Voquenza 10 mg daily for 4 weeks to see if symptoms improve.  Discussed reflux precautions.  She has mild right upper quadrant epigastric tenderness on exam today without any guarding or rebound.  This may be related to reflux.  Discussed today if abdominal pain does not improve after starting the Voquenza recommend ordering a gallbladder ultrasound.  Referral placed to GI for further evaluation. Discussed with her if the Voquenza does not help can try a low-dose of montelukast 4 to 8 mg nightly to see if the cough is related to allergies.  She will update me with symptoms in 2 weeks.

## 2024-10-13 ENCOUNTER — APPOINTMENT (OUTPATIENT)
Dept: GENERAL RADIOLOGY | Facility: HOSPITAL | Age: 89
End: 2024-10-13
Payer: MEDICARE

## 2024-10-13 ENCOUNTER — APPOINTMENT (OUTPATIENT)
Dept: CT IMAGING | Facility: HOSPITAL | Age: 89
End: 2024-10-13
Payer: MEDICARE

## 2024-10-13 ENCOUNTER — HOSPITAL ENCOUNTER (OUTPATIENT)
Facility: HOSPITAL | Age: 89
Setting detail: OBSERVATION
Discharge: HOME | End: 2024-10-15
Attending: EMERGENCY MEDICINE | Admitting: STUDENT IN AN ORGANIZED HEALTH CARE EDUCATION/TRAINING PROGRAM
Payer: MEDICARE

## 2024-10-13 DIAGNOSIS — S50.12XA CONTUSION OF LEFT FOREARM, INITIAL ENCOUNTER: ICD-10-CM

## 2024-10-13 DIAGNOSIS — S06.5XAA ACUTE SUBDURAL HEMATOMA: Primary | ICD-10-CM

## 2024-10-13 LAB
ALBUMIN SERPL-MCNC: 3.9 G/DL (ref 3.5–5.2)
ALBUMIN/GLOB SERPL: 1.5 G/DL
ALP SERPL-CCNC: 70 U/L (ref 39–117)
ALT SERPL W P-5'-P-CCNC: 36 U/L (ref 1–33)
ANION GAP SERPL CALCULATED.3IONS-SCNC: 11 MMOL/L (ref 5–15)
APTT PPP: 26.7 SECONDS (ref 22.7–35.4)
AST SERPL-CCNC: 39 U/L (ref 1–32)
BASOPHILS # BLD AUTO: 0.03 10*3/MM3 (ref 0–0.2)
BASOPHILS NFR BLD AUTO: 0.3 % (ref 0–1.5)
BILIRUB SERPL-MCNC: 0.7 MG/DL (ref 0–1.2)
BUN SERPL-MCNC: 17 MG/DL (ref 8–23)
BUN/CREAT SERPL: 16 (ref 7–25)
CALCIUM SPEC-SCNC: 9.2 MG/DL (ref 8.2–9.6)
CHLORIDE SERPL-SCNC: 103 MMOL/L (ref 98–107)
CO2 SERPL-SCNC: 25 MMOL/L (ref 22–29)
CREAT SERPL-MCNC: 1.06 MG/DL (ref 0.57–1)
DEPRECATED RDW RBC AUTO: 43.9 FL (ref 37–54)
EGFRCR SERPLBLD CKD-EPI 2021: 48.8 ML/MIN/1.73
EOSINOPHIL # BLD AUTO: 0.09 10*3/MM3 (ref 0–0.4)
EOSINOPHIL NFR BLD AUTO: 0.9 % (ref 0.3–6.2)
ERYTHROCYTE [DISTWIDTH] IN BLOOD BY AUTOMATED COUNT: 12.5 % (ref 12.3–15.4)
GLOBULIN UR ELPH-MCNC: 2.6 GM/DL
GLUCOSE SERPL-MCNC: 113 MG/DL (ref 65–99)
HCT VFR BLD AUTO: 38.2 % (ref 34–46.6)
HGB BLD-MCNC: 12.8 G/DL (ref 12–15.9)
IMM GRANULOCYTES # BLD AUTO: 0.03 10*3/MM3 (ref 0–0.05)
IMM GRANULOCYTES NFR BLD AUTO: 0.3 % (ref 0–0.5)
INR PPP: 1.17 (ref 0.9–1.1)
LYMPHOCYTES # BLD AUTO: 1.83 10*3/MM3 (ref 0.7–3.1)
LYMPHOCYTES NFR BLD AUTO: 17.7 % (ref 19.6–45.3)
MCH RBC QN AUTO: 31.6 PG (ref 26.6–33)
MCHC RBC AUTO-ENTMCNC: 33.5 G/DL (ref 31.5–35.7)
MCV RBC AUTO: 94.3 FL (ref 79–97)
MONOCYTES # BLD AUTO: 0.8 10*3/MM3 (ref 0.1–0.9)
MONOCYTES NFR BLD AUTO: 7.8 % (ref 5–12)
NEUTROPHILS NFR BLD AUTO: 7.53 10*3/MM3 (ref 1.7–7)
NEUTROPHILS NFR BLD AUTO: 73 % (ref 42.7–76)
NRBC BLD AUTO-RTO: 0 /100 WBC (ref 0–0.2)
PLATELET # BLD AUTO: 188 10*3/MM3 (ref 140–450)
PMV BLD AUTO: 8.8 FL (ref 6–12)
POTASSIUM SERPL-SCNC: 4.6 MMOL/L (ref 3.5–5.2)
PROT SERPL-MCNC: 6.5 G/DL (ref 6–8.5)
PROTHROMBIN TIME: 15.2 SECONDS (ref 11.7–14.2)
QT INTERVAL: 439 MS
QTC INTERVAL: 453 MS
RBC # BLD AUTO: 4.05 10*6/MM3 (ref 3.77–5.28)
SODIUM SERPL-SCNC: 139 MMOL/L (ref 136–145)
WBC NRBC COR # BLD AUTO: 10.31 10*3/MM3 (ref 3.4–10.8)

## 2024-10-13 PROCEDURE — 80053 COMPREHEN METABOLIC PANEL: CPT | Performed by: PHYSICIAN ASSISTANT

## 2024-10-13 PROCEDURE — G0378 HOSPITAL OBSERVATION PER HR: HCPCS

## 2024-10-13 PROCEDURE — 71045 X-RAY EXAM CHEST 1 VIEW: CPT

## 2024-10-13 PROCEDURE — 93005 ELECTROCARDIOGRAM TRACING: CPT | Performed by: PHYSICIAN ASSISTANT

## 2024-10-13 PROCEDURE — 93010 ELECTROCARDIOGRAM REPORT: CPT | Performed by: INTERNAL MEDICINE

## 2024-10-13 PROCEDURE — 72125 CT NECK SPINE W/O DYE: CPT

## 2024-10-13 PROCEDURE — 85610 PROTHROMBIN TIME: CPT | Performed by: PHYSICIAN ASSISTANT

## 2024-10-13 PROCEDURE — 70450 CT HEAD/BRAIN W/O DYE: CPT

## 2024-10-13 PROCEDURE — 99285 EMERGENCY DEPT VISIT HI MDM: CPT

## 2024-10-13 PROCEDURE — 73110 X-RAY EXAM OF WRIST: CPT

## 2024-10-13 PROCEDURE — 85025 COMPLETE CBC W/AUTO DIFF WBC: CPT | Performed by: PHYSICIAN ASSISTANT

## 2024-10-13 PROCEDURE — 36415 COLL VENOUS BLD VENIPUNCTURE: CPT

## 2024-10-13 PROCEDURE — 85730 THROMBOPLASTIN TIME PARTIAL: CPT | Performed by: PHYSICIAN ASSISTANT

## 2024-10-13 PROCEDURE — 73070 X-RAY EXAM OF ELBOW: CPT

## 2024-10-13 RX ORDER — HYDROCHLOROTHIAZIDE 12.5 MG/1
12.5 TABLET ORAL DAILY
Status: DISCONTINUED | OUTPATIENT
Start: 2024-10-14 | End: 2024-10-15 | Stop reason: HOSPADM

## 2024-10-13 RX ORDER — CHOLECALCIFEROL (VITAMIN D3) 25 MCG
1000 TABLET ORAL DAILY
Status: DISCONTINUED | OUTPATIENT
Start: 2024-10-14 | End: 2024-10-15 | Stop reason: HOSPADM

## 2024-10-13 RX ORDER — UBIDECARENONE 75 MG
50 CAPSULE ORAL DAILY
Status: DISCONTINUED | OUTPATIENT
Start: 2024-10-14 | End: 2024-10-15 | Stop reason: HOSPADM

## 2024-10-13 RX ORDER — ATORVASTATIN CALCIUM 20 MG/1
20 TABLET, FILM COATED ORAL DAILY
Status: DISCONTINUED | OUTPATIENT
Start: 2024-10-14 | End: 2024-10-15 | Stop reason: HOSPADM

## 2024-10-13 RX ORDER — BENZONATATE 100 MG/1
100 CAPSULE ORAL 3 TIMES DAILY PRN
Status: DISCONTINUED | OUTPATIENT
Start: 2024-10-13 | End: 2024-10-15 | Stop reason: HOSPADM

## 2024-10-13 RX ORDER — ALBUTEROL SULFATE 0.83 MG/ML
2.5 SOLUTION RESPIRATORY (INHALATION) EVERY 6 HOURS PRN
Status: DISCONTINUED | OUTPATIENT
Start: 2024-10-13 | End: 2024-10-15 | Stop reason: HOSPADM

## 2024-10-13 RX ORDER — ONDANSETRON 2 MG/ML
4 INJECTION INTRAMUSCULAR; INTRAVENOUS EVERY 6 HOURS PRN
Status: DISCONTINUED | OUTPATIENT
Start: 2024-10-13 | End: 2024-10-15 | Stop reason: HOSPADM

## 2024-10-13 RX ORDER — GUAIFENESIN 600 MG/1
1200 TABLET, EXTENDED RELEASE ORAL 2 TIMES DAILY
Status: DISCONTINUED | OUTPATIENT
Start: 2024-10-13 | End: 2024-10-15 | Stop reason: HOSPADM

## 2024-10-13 RX ORDER — NITROGLYCERIN 0.4 MG/1
0.4 TABLET SUBLINGUAL
Status: DISCONTINUED | OUTPATIENT
Start: 2024-10-13 | End: 2024-10-15 | Stop reason: HOSPADM

## 2024-10-13 RX ORDER — SODIUM CHLORIDE 0.9 % (FLUSH) 0.9 %
10 SYRINGE (ML) INJECTION EVERY 12 HOURS SCHEDULED
Status: DISCONTINUED | OUTPATIENT
Start: 2024-10-13 | End: 2024-10-15 | Stop reason: HOSPADM

## 2024-10-13 RX ORDER — SODIUM CHLORIDE 0.9 % (FLUSH) 0.9 %
10 SYRINGE (ML) INJECTION AS NEEDED
Status: DISCONTINUED | OUTPATIENT
Start: 2024-10-13 | End: 2024-10-15 | Stop reason: HOSPADM

## 2024-10-13 RX ORDER — ACETAMINOPHEN 325 MG/1
650 TABLET ORAL EVERY 6 HOURS PRN
Status: DISCONTINUED | OUTPATIENT
Start: 2024-10-13 | End: 2024-10-15 | Stop reason: HOSPADM

## 2024-10-13 RX ORDER — AMLODIPINE BESYLATE 10 MG/1
5 TABLET ORAL
Status: DISCONTINUED | OUTPATIENT
Start: 2024-10-14 | End: 2024-10-15 | Stop reason: HOSPADM

## 2024-10-13 RX ORDER — ASPIRIN 81 MG/1
81 TABLET ORAL DAILY
Status: DISCONTINUED | OUTPATIENT
Start: 2024-10-14 | End: 2024-10-13

## 2024-10-13 RX ORDER — PREDNISOLONE ACETATE 10 MG/ML
1 SUSPENSION/ DROPS OPHTHALMIC EVERY 8 HOURS SCHEDULED
Status: DISCONTINUED | OUTPATIENT
Start: 2024-10-13 | End: 2024-10-15 | Stop reason: HOSPADM

## 2024-10-13 RX ORDER — METOPROLOL TARTRATE 50 MG
50 TABLET ORAL 2 TIMES DAILY
Status: DISCONTINUED | OUTPATIENT
Start: 2024-10-13 | End: 2024-10-15 | Stop reason: HOSPADM

## 2024-10-13 RX ADMIN — AMITRIPTYLINE HYDROCHLORIDE 37.5 MG: 25 TABLET, FILM COATED ORAL at 21:16

## 2024-10-13 RX ADMIN — ACETAMINOPHEN 325MG 650 MG: 325 TABLET ORAL at 21:17

## 2024-10-13 RX ADMIN — METOPROLOL TARTRATE 50 MG: 50 TABLET, FILM COATED ORAL at 21:20

## 2024-10-13 RX ADMIN — Medication 10 ML: at 21:19

## 2024-10-13 RX ADMIN — PREDNISOLONE ACETATE 1 DROP: 10 SUSPENSION/ DROPS OPHTHALMIC at 21:24

## 2024-10-13 RX ADMIN — ACETAMINOPHEN 325MG 650 MG: 325 TABLET ORAL at 15:22

## 2024-10-13 RX ADMIN — GUAIFENESIN 1200 MG: 600 TABLET, EXTENDED RELEASE ORAL at 21:17

## 2024-10-13 NOTE — ED PROVIDER NOTES
MD ATTESTATION NOTE     SHARED VISIT: This visit was performed by BOTH a physician and an APC. The substantive portion of the medical decision making was performed by this attesting physician who made or approved the management plan and takes responsibility for patient management. All studies in the APC note (if performed) were independently interpreted by me.  The PAULA and I have discussed this patient's history, physical exam, and treatment plan. I have reviewed the documentation and personally had a face to face interaction with the patient. I affirm the documentation and agree with the treatment and plan. I provided a substantive portion of the care of the patient.  I personally performed the physical exam in its entirety, and below are my findings.      Brief HPI: Patient presents to the ED after an acute fall occurred this morning.  Patient became dizzy and fell.  She hit her head on a door frame.  She also injured her left arm.  Denies loss consciousness, nausea, vomiting, or altered mental status.  She is on Eliquis.    PHYSICAL EXAM    GENERAL: Awake and alert.  Nontoxic-appearing elderly female.  Resting comfortably in no acute distress  HENT: nares patent, NCAT  EYES: no scleral icterus  CV: regular rhythm, normal rate  RESPIRATORY: normal effort, CTAB  ABDOMEN: soft, nondistended  MUSCULOSKELETAL: There is tenderness of the left elbow and wrist.  Remainder of her extremities are nontender.  No obvious deformity.  C/T/L-spine and chest are nontender  NEURO: alert, moves all extremities, follows commands  PSYCH:  calm, cooperative  SKIN: warm, dry    Vital signs and nursing notes reviewed.        Plan: Obtain imaging studies.    Left wrist/elbow x-rays personally interpreted by me.  My personal interpretation is: No fracture.  No dislocation    EKG personally interpreted by me at 1320.  My personal interpretation is:         EKG time: 1316  Rhythm/Rate: Ventricular paced complexes, rate 64  P waves and OK:  First-degree AV block  QRS, axis: LAD, IVCD  ST and T waves: Inverted T waves in lateral leads    Interpreted Contemporaneously by me, independently viewed  No prior available for comparison       MDM: Patient presented to the ED after mechanical fall.  She complained of a head injury and left elbow/wrist pain.  She was found to have a small subdural hematoma.  She is on Eliquis.  She is neuro intact.  Case was discussed with neurosurgery.  Patient will be admitted to the hospitalist.    ED Course as of 10/13/24 1335   Sun Oct 13, 2024   1230 CT Head Without Contrast  Discussed case with Dr. Carranza, neuroradiology, who reports small localized subdural hematoma.   [DC]   1310 Discussed case with Dr. Alexandre, neurosurgery, who recommends admission to telemetry. He does not recommend reversing eliquis at this time. [DC]   1321 Discussed with patient's daughter the CT imaging results and plan for hospital admission. She is agreeable. [DC]   1328 Discussed case with Dr. Patel, [facility], who will admit the patient. [DC]      ED Course User Index  [DC] Katia Rojas PA Holland, William D, MD  10/13/24 1336

## 2024-10-13 NOTE — ED PROVIDER NOTES
EMERGENCY DEPARTMENT ENCOUNTER      PCP: Sharon Parry APRN  Patient Care Team:  Sharon Parry APRN as PCP - General (Nurse Practitioner)  Angelina Hebert APRN as Nurse Practitioner (Gastroenterology)  Cindy Daniels MD as Surgeon (Orthopedic Surgery)   Independent Historians: Patient, family at bedside    HPI:  Chief Complaint: Fall, head injury, arm pain   A complete HPI/ROS/PMH/PSH/SH/FH are unobtainable due to: None    Chronic or social conditions impacting patient care (social determinants of health): None    Context: Norma Trejo is a 89 y.o. female who presents to the ED From home with family c/o acute fall that occurred early this morning.  Patient has chronic dizziness and had gotten up to use the restroom.  She was on her way back using her walker when she became somewhat dizzy and fell to her left side.  She reports striking the left side of her head on a door frame and hitting her left arm.  She denies loss of consciousness.  She denies any chest pain or shortness of breath.  Reports mild headache but reports mostly that her left arm hurts.  No prior surgical history to the arm.  She does take Eliquis 2.5 mg twice daily.    Review of prior external notes and/or external test results outside of this encounter: CMP on 7/24/24 showed creatinine of 1.23. Hemoglobin A1c on 2/29/24 was 5.8.      PAST MEDICAL HISTORY  Active Ambulatory Problems     Diagnosis Date Noted    History of CVA (cerebrovascular accident) 10/12/2020    Benign essential hypertension 02/11/2017    Cardiac pacemaker in situ 04/28/2021    Chronic anticoagulation 04/28/2021    Hypertensive encephalopathy 12/01/2016    PAF (paroxysmal atrial fibrillation) 04/28/2021    Sinus bradycardia 02/11/2017    Syncope and collapse 10/12/2020    Vitamin D deficiency 04/03/2023    Herpes labialis 04/03/2023    Migraine 04/03/2023    Anxiety with depression 05/12/2023    Primary insomnia 05/12/2023    GERD (gastroesophageal reflux  disease) 10/09/2024     Resolved Ambulatory Problems     Diagnosis Date Noted    HTN (hypertension) 12/02/2016    Stroke-like symptoms 10/12/2020     Past Medical History:   Diagnosis Date    Depression        The patient has started, but not completed, their COVID-19 vaccination series.    PAST SURGICAL HISTORY  History reviewed. No pertinent surgical history.      FAMILY HISTORY  Family History   Problem Relation Age of Onset    Kidney disease Mother     Heart attack Father     Heart failure Father     Diabetes Sister     Cancer Sister     No Known Problems Brother     Colon cancer Neg Hx     Colon polyps Neg Hx     Crohn's disease Neg Hx     Irritable bowel syndrome Neg Hx     Ulcerative colitis Neg Hx          SOCIAL HISTORY  Social History     Socioeconomic History    Marital status:    Tobacco Use    Smoking status: Never    Smokeless tobacco: Never   Vaping Use    Vaping status: Never Used   Substance and Sexual Activity    Alcohol use: Not Currently    Drug use: Never    Sexual activity: Not Currently         ALLERGIES  Patient has no known allergies.        REVIEW OF SYSTEMS  Review of Systems   Respiratory:  Negative for shortness of breath.    Musculoskeletal:  Negative for neck pain.        Left arm pain   Neurological:  Positive for headaches. Negative for syncope.        All systems reviewed and negative except for those discussed in HPI.       PHYSICAL EXAM    I have reviewed the triage vital signs and nursing notes.    ED Triage Vitals   Temp Heart Rate Resp BP SpO2   10/13/24 1111 10/13/24 1111 10/13/24 1111 10/13/24 1118 10/13/24 1111   97.7 °F (36.5 °C) 68 16 159/69 97 %      Temp src Heart Rate Source Patient Position BP Location FiO2 (%)   10/13/24 1111 10/13/24 1111 10/13/24 1118 10/13/24 1118 --   Tympanic Monitor Lying Right arm        Physical Exam  GENERAL: alert, no acute distress  SKIN: Warm, dry  HENT: Normocephalic, atraumatic  EYES: no scleral icterus  CV: regular rhythm,  regular rate  RESPIRATORY: normal effort, lungs clear  ABDOMEN: soft, nontender, nondistended  MUSCULOSKELETAL: no deformity, increased pain with supination and extension at the left elbow, NVI, no midline C/T/L spine tenderness  NEURO: alert, moves all extremities, follows commands          LAB RESULTS  Labs Reviewed   COMPREHENSIVE METABOLIC PANEL - Abnormal; Notable for the following components:       Result Value    Glucose 113 (*)     Creatinine 1.06 (*)     ALT (SGPT) 36 (*)     AST (SGOT) 39 (*)     eGFR 48.8 (*)     All other components within normal limits    Narrative:     GFR Normal >60  Chronic Kidney Disease <60  Kidney Failure <15    The GFR formula is only valid for adults with stable renal function between ages 18 and 70.   PROTIME-INR - Abnormal; Notable for the following components:    Protime 15.2 (*)     INR 1.17 (*)     All other components within normal limits   CBC WITH AUTO DIFFERENTIAL - Abnormal; Notable for the following components:    Lymphocyte % 17.7 (*)     Neutrophils, Absolute 7.53 (*)     All other components within normal limits   COMPREHENSIVE METABOLIC PANEL - Abnormal; Notable for the following components:    Glucose 104 (*)     Creatinine 1.09 (*)     Total Protein 5.9 (*)     eGFR 47.2 (*)     All other components within normal limits    Narrative:     GFR Normal >60  Chronic Kidney Disease <60  Kidney Failure <15    The GFR formula is only valid for adults with stable renal function between ages 18 and 70.   APTT - Normal   CBC WITH AUTO DIFFERENTIAL - Normal   CBC AND DIFFERENTIAL    Narrative:     The following orders were created for panel order CBC & Differential.  Procedure                               Abnormality         Status                     ---------                               -----------         ------                     CBC Auto Differential[[id number]]        Abnormal            Final result                 Please view results for these tests on the  individual orders.       Ordered the above labs and independently reviewed and interpreted the results.        RADIOLOGY  XR Chest 1 View   Final Result   No acute cardiopulmonary process       This report was finalized on 10/13/2024 1:31 PM by Dr. Phani Bustillos M.D on Workstation: [id number]          CT Head Without Contrast   Final Result       Small localized acute subdural hematoma measuring up to approximately 4   mm in width and 8 mm in greatest craniocaudad dimensions that is seen   along the right aspect of the falx cerebri and medial to the right   occipital lobe.           TECHNIQUE: CT scan of the cervical spine was obtained with 1 mm axial   bone algorithm and 2 mm axial soft tissue algorithm images. Sagittal and   coronal reconstructed images were obtained.       FINDINGS:       There is no evidence for acute fracture or bony malalignment involving   the cervical spine. Mild chronic appearing vertebral body height loss is   seen at C3, C4, C5, C6, and C7.       A disc osteophyte complex at C3-4 and C6-7 result in relatively mild   degrees of canal narrowing. Mild foraminal narrowing is seen at C3-4 and   C4-5 secondary to uncovertebral joint hypertrophy.       Incidental note is made of a partially visualized groundglass   abnormality within the visualized right lung apex which measures up to   approximately 2.3 x 1.8 cm in greatest axial dimensions within its   visualized portions. Similar findings were seen on a prior chest CT   dated 9/25/2023. The etiology of this abnormality is unclear and this   could be inflammatory or neoplastic. This could be further evaluated   with a CT scan of the chest for more comprehensive assessment.       IMPRESSION:       No evidence for acute fracture or bony malalignment involving the   cervical spine.       Incidental degenerative phenomena as discussed above.       Incidental note is made of a partially visualized groundglass   abnormality within the  visualized right lung apex which measures up to   approximately 2.3 x 1.8 cm in greatest axial dimensions within its   visualized portions. Similar findings were seen on a prior chest CT   dated 9/25/2023. The etiology of this abnormality is unclear and this   could be inflammatory or neoplastic. This could be further evaluated   with a CT scan of the chest for more comprehensive assessment.       The findings of this report were discussed with Katia Rojas on   10/13/2024 at approximately 12:30 p.m.               Radiation dose reduction techniques were utilized, including automated   exposure control and exposure modulation based on body size.       This report was finalized on 10/13/2024 12:34 PM by Dr. Donato Carranza M.D on Workstation: [id number]          CT Cervical Spine Without Contrast   Final Result       Small localized acute subdural hematoma measuring up to approximately 4   mm in width and 8 mm in greatest craniocaudad dimensions that is seen   along the right aspect of the falx cerebri and medial to the right   occipital lobe.           TECHNIQUE: CT scan of the cervical spine was obtained with 1 mm axial   bone algorithm and 2 mm axial soft tissue algorithm images. Sagittal and   coronal reconstructed images were obtained.       FINDINGS:       There is no evidence for acute fracture or bony malalignment involving   the cervical spine. Mild chronic appearing vertebral body height loss is   seen at C3, C4, C5, C6, and C7.       A disc osteophyte complex at C3-4 and C6-7 result in relatively mild   degrees of canal narrowing. Mild foraminal narrowing is seen at C3-4 and   C4-5 secondary to uncovertebral joint hypertrophy.       Incidental note is made of a partially visualized groundglass   abnormality within the visualized right lung apex which measures up to   approximately 2.3 x 1.8 cm in greatest axial dimensions within its   visualized portions. Similar findings were seen on a prior chest CT    dated 9/25/2023. The etiology of this abnormality is unclear and this   could be inflammatory or neoplastic. This could be further evaluated   with a CT scan of the chest for more comprehensive assessment.       IMPRESSION:       No evidence for acute fracture or bony malalignment involving the   cervical spine.       Incidental degenerative phenomena as discussed above.       Incidental note is made of a partially visualized groundglass   abnormality within the visualized right lung apex which measures up to   approximately 2.3 x 1.8 cm in greatest axial dimensions within its   visualized portions. Similar findings were seen on a prior chest CT   dated 9/25/2023. The etiology of this abnormality is unclear and this   could be inflammatory or neoplastic. This could be further evaluated   with a CT scan of the chest for more comprehensive assessment.       The findings of this report were discussed with Katia Rojas on   10/13/2024 at approximately 12:30 p.m.               Radiation dose reduction techniques were utilized, including automated   exposure control and exposure modulation based on body size.       This report was finalized on 10/13/2024 12:34 PM by Dr. Donato Carranza M.D on Workstation: [id number]          XR ELBOW 2 VIEW LEFT   Final Result   No fracture or dislocation       This report was finalized on 10/13/2024 12:09 PM by Dr. Phani Bustillos M.D on Workstation: [id number]          XR Wrist 3+ View Left   Final Result   No fracture or dislocation.       This report was finalized on 10/13/2024 12:13 PM by Dr. Phani Bustillos M.D on Workstation: [id number]              I ordered the above noted radiological studies. Independently reviewed and interpreted by me.  See dictation for official radiology interpretation.      PROCEDURES    Procedures      MEDICATIONS GIVEN IN ER    Medications - No data to display        PROGRESS, DATA ANALYSIS, CONSULTS, AND MEDICAL DECISION MAKING    All  labs have been independently reviewed and interpreted by me.  All radiology studies have been independently reviewed and interpreted by me and discussed with radiologist dictating the report.   EKG's independently reviewed and interpreted by me.  Discussion below represents my analysis of pertinent findings related to patient's condition, differential diagnosis, treatment plan and final disposition.    Differential diagnosis: concussion, intracranial hemorrhage, fracture, contusion    ED Course as of 10/18/24 1140   Sun Oct 13, 2024   1230 CT Head Without Contrast  Discussed case with Dr. Carranza, neuroradiology, who reports small localized subdural hematoma.   [DC]   1310 Discussed case with Dr. Alexandre, neurosurgery, who recommends admission to telemetry. He does not recommend reversing eliquis at this time. [DC]   1321 Discussed with patient's daughter the CT imaging results and plan for hospital admission. She is agreeable. [DC]   1328 Discussed case with Dr. Patel, [facility], who will admit the patient. [DC]      ED Course User Index  [DC] Katia Rojas PA             AS OF 11:40 EDT VITALS:    BP - 124/63  HR - 69  TEMP - 97.3 °F (36.3 °C) (Oral)  O2 SATS - 94%        DIAGNOSIS  Final diagnoses:   Acute subdural hematoma   Contusion of left forearm, initial encounter         DISPOSITION  ED Disposition       ED Disposition   Decision to Admit    Condition   --    Comment   Level of Care: Telemetry [5]   Diagnosis: Acute subdural hematoma [[id number]]   Admitting Physician: GILBERT PATEL [[id number]]   Attending Physician: GILBERT PATEL [[id number]]   Is patient appropriate for Inpatient Observation Unit?: No [0]                    Note Disclaimer: At [facility], we believe that sharing information builds trust and better relationships. You are receiving this note because you recently visited [facility]. It is possible you will see health information before a provider has talked with you about it. This kind of information can  be easy to misunderstand. To help you fully understand what it means for your health, we urge you to discuss this note with your provider.         Katia Rojas PA  10/18/24 [id number]

## 2024-10-13 NOTE — PLAN OF CARE
Goal Outcome Evaluation:     89 yo female admitted 10/13 with subdural hematoma. Complains of L arm pain r/t contusion suffered in fall, PRN tylenol administered. Neurosurgery consult. A&OX4, neurologically intact. [place] speaking, family to remain at bedside. VS stable, room air.

## 2024-10-13 NOTE — H&P
Internal medicine history and physical  INTERNAL MEDICINE   [facility]       Patient Identification:  Name: Norma Trejo  Age: 89 y.o.  Sex: female  :  1930  MRN: [id number]                   Primary Care Physician: Sharon Parry APRN                               Date of admission:10/13/2024    Chief Complaint: Brought to the emergency room for evaluation of left forearm and wrist pain and evaluation of head injury after a fall hitting her head to the door frame earlier today.    History of Present Illness:   Source of information patient's daughter at the bedside as patient herself does not speak English very well.  Extensive review of records as some of the information is not clearly available.  Patient is a 89-year-old fairly active female with past medical history remarkable for hypertension dyslipidemia depression, hospitalization in 2022 and was found to have acute CVA with new onset atrial fibrillation, sick sinus syndrome status post permanent pacemaker placement in 2020 and was placed on Eliquis was in her usual state of her health until this morning when as she was going to the bathroom she lost her balance hit her head and left wrist on the door frame.  Patient did not lose consciousness.  Patient's daughter was more concerned about her left hand and wrist pain resulting in her being brought to the emergency room where workup included CT scan of the head because of the reported head injury which showed evidence of small localized subdural hematoma measuring 4 mm x 8 mm along the right aspect of fourth cerebri medial to the right occipital lobe.  X-ray of the left wrist and hand did not show any fracture.  Patient case was discussed with neurosurgery on-call and plan is to admit the patient for monitoring and further evaluation by neurosurgery service.  Patient left wrist pain and discomfort is much improved and she is spontaneously able to move her left  hand and wrist which is quite better compared to how she was when she come into the hospital as per family members were at the bedside.  Patient at present denies any specific complaints.    Past Medical History:  Past Medical History:   Diagnosis Date    Depression      Past Surgical History:  No past surgical history on file.   Home Meds:  Medications Prior to Admission   Medication Sig Dispense Refill Last Dose/Taking    amitriptyline (ELAVIL) 25 MG tablet 1 tablet. 1.5 tablet a night   10/12/2024    amLODIPine (NORVASC) 2.5 MG tablet TAKE 1 TABLET BY MOUTH DAILY (Patient taking differently: Take 1 tablet by mouth 3 times a day. Pt taking 2 tablet AM and 1 tab at night) 90 tablet 5 10/13/2024    aspirin 81 MG EC tablet Daily.   10/13/2024    atorvastatin (LIPITOR) 20 MG tablet TAKE 1 TABLET BY MOUTH DAILY 90 tablet 1 10/13/2024    cholecalciferol (VITAMIN D3) 25 MCG (1000 UT) tablet Take 1 tablet by mouth Daily.   10/13/2024    Eliquis 2.5 MG tablet tablet TAKE 1 TABLET BY MOUTH 2 TIMES A  tablet 1 10/13/2024    guaiFENesin (MUCINEX) 600 MG 12 hr tablet Take 2 tablets by mouth 2 (Two) Times a Day.   10/13/2024    hydroCHLOROthiazide 12.5 MG tablet TAKE 1 TABLET BY MOUTH DAILY 90 tablet 1 10/13/2024    metoprolol tartrate (LOPRESSOR) 50 MG tablet Take 1 tablet by mouth 2 (Two) Times a Day. 180 tablet 0 10/13/2024    vitamin B-12 (CYANOCOBALAMIN) 100 MCG tablet Take 0.5 tablets by mouth Daily.   10/13/2024    Vonoprazan Fumarate (Voquezna) 10 MG tablet Take 1 tablet by mouth Daily. 25 tablet 0 10/12/2024    albuterol sulfate  (90 Base) MCG/ACT inhaler Inhale 2 puffs Every 4 (Four) Hours As Needed for Wheezing. 8 g 1     benzonatate (Tessalon Perles) 100 MG capsule Take 1 capsule by mouth 3 (Three) Times a Day As Needed for Cough. (Patient not taking: Reported on 10/9/2024) 30 capsule 0     prednisoLONE acetate (PRED FORTE) 1 % ophthalmic suspension         Current Meds:     Current  Facility-Administered Medications:     acetaminophen (TYLENOL) tablet 650 mg, 650 mg, Oral, Q6H PRN, Lashaun Patel MD, 650 mg at 10/13/24 1522    [CANCELED] Insert Peripheral IV, , , Once **AND** sodium chloride 0.9 % flush 10 mL, 10 mL, Intravenous, PRN, Katia Rojas PA  Allergies:  No Known Allergies  Social History:   Social History     Tobacco Use    Smoking status: Never    Smokeless tobacco: Never   Substance Use Topics    Alcohol use: Not Currently      Family History:  Family History   Problem Relation Age of Onset    Kidney disease Mother     Heart attack Father     Heart failure Father     Diabetes Sister     Cancer Sister     No Known Problems Brother     Colon cancer Neg Hx     Colon polyps Neg Hx     Crohn's disease Neg Hx     Irritable bowel syndrome Neg Hx     Ulcerative colitis Neg Hx           Review of Systems  See history of present illness and past medical history.        Vitals:   /60 (BP Location: Right arm, Patient Position: Lying)   Pulse 65   Temp 98.2 °F (36.8 °C) (Oral)   Resp 16   SpO2 95%   I/O: No intake or output data in the 24 hours ending 10/13/24 1709  Exam:  Patient is examined using the personal protective equipment as per guidelines from infection control for this particular patient as enacted.  Hand washing was performed before and after patient interaction.  General Appearance:  Alert interactive and appropriate engages with her daughter and does not speak English.   Head:    Normocephalic, without obvious abnormality, atraumatic no obvious bruising or contusion noted a tender spot noted.   Eyes:    PERRL, conjunctiva/corneas clear, EOM's intact, both eyes   Ears:    Normal external ear canals, both ears   Nose:   Nares normal, septum midline, mucosa normal, no drainage    or sinus tenderness   Throat:   Lips, tongue, gums normal; oral mucosa pink and moist   Neck: Supple   Back:     Symmetric, no curvature, ROM normal, no CVA tenderness   Lungs:     Clear to  auscultation bilaterally, respirations unlabored   Chest Wall:    No tenderness or deformity    Heart:  Paced rhythm   Abdomen:   Soft nontender   Extremities: Swelling of the left hand and wrist but range of motion intact and patient spontaneously able to move her left hand.   Pulses:   Pulses palpable in all extremities; symmetric all extremities   Skin:   Skin color normal, Skin is warm and dry,  no rashes or palpable lesions   Neurologic: Grossly nonfocal follows command.       Data Review:      I reviewed the patient's new clinical results.  Results from last 7 days   Lab Units 10/13/24  1314   WBC 10*3/mm3 10.31   HEMOGLOBIN g/dL 12.8   PLATELETS 10*3/mm3 188     Results from last 7 days   Lab Units 10/13/24  1408   SODIUM mmol/L 139   POTASSIUM mmol/L 4.6   CHLORIDE mmol/L 103   CO2 mmol/L 25.0   BUN mg/dL 17   CREATININE mg/dL 1.06*   CALCIUM mg/dL 9.2   GLUCOSE mg/dL 113*     ECG 12 Lead Other; dizziness   Final Result   HEART RATE=64  bpm   RR Eydoedbc=026  ms   AZ Sxwgihul=161  ms   P Horizontal Axis=228  deg   P Front Axis=-67  deg   QRSD Cctvmsry=010  ms   QT Mtpunnlj=270  ms   ALnA=405  ms   QRS Axis=-53  deg   T Wave Axis=93  deg   - ABNORMAL ECG -   Ventricular-paced complexes   No further analysis attempted due to paced rhythm   No Prior Tracing for Comparison   Electronically Signed By: [facility] ([facility] 2024-10-13 19:55:29   Date and Time of Study:2024-10-13 13:16:26      Telemetry Scan   Final Result      Telemetry Scan   Final Result        XR Chest 1 View    Result Date: 10/13/2024  No acute cardiopulmonary process  This report was finalized on 10/13/2024 1:31 PM by Dr. Phani Bustillos M.D on Workstation: [id number]      CT Head Without Contrast    Result Date: 10/13/2024   Small localized acute subdural hematoma measuring up to approximately 4 mm in width and 8 mm in greatest craniocaudad dimensions that is seen along the right aspect of the falx cerebri and medial to the right  occipital lobe.   TECHNIQUE: CT scan of the cervical spine was obtained with 1 mm axial bone algorithm and 2 mm axial soft tissue algorithm images. Sagittal and coronal reconstructed images were obtained.  FINDINGS:  There is no evidence for acute fracture or bony malalignment involving the cervical spine. Mild chronic appearing vertebral body height loss is seen at C3, C4, C5, C6, and C7.  A disc osteophyte complex at C3-4 and C6-7 result in relatively mild degrees of canal narrowing. Mild foraminal narrowing is seen at C3-4 and C4-5 secondary to uncovertebral joint hypertrophy.  Incidental note is made of a partially visualized groundglass abnormality within the visualized right lung apex which measures up to approximately 2.3 x 1.8 cm in greatest axial dimensions within its visualized portions. Similar findings were seen on a prior chest CT dated 9/25/2023. The etiology of this abnormality is unclear and this could be inflammatory or neoplastic. This could be further evaluated with a CT scan of the chest for more comprehensive assessment.  IMPRESSION:  No evidence for acute fracture or bony malalignment involving the cervical spine.  Incidental degenerative phenomena as discussed above.  Incidental note is made of a partially visualized groundglass abnormality within the visualized right lung apex which measures up to approximately 2.3 x 1.8 cm in greatest axial dimensions within its visualized portions. Similar findings were seen on a prior chest CT dated 9/25/2023. The etiology of this abnormality is unclear and this could be inflammatory or neoplastic. This could be further evaluated with a CT scan of the chest for more comprehensive assessment.  The findings of this report were discussed with Katia Rojas on 10/13/2024 at approximately 12:30 p.m.    Radiation dose reduction techniques were utilized, including automated exposure control and exposure modulation based on body size.  This report was finalized on  10/13/2024 12:34 PM by Dr. Donato Carranza M.D on Workstation: [id number]      CT Cervical Spine Without Contrast    Result Date: 10/13/2024   Small localized acute subdural hematoma measuring up to approximately 4 mm in width and 8 mm in greatest craniocaudad dimensions that is seen along the right aspect of the falx cerebri and medial to the right occipital lobe.   TECHNIQUE: CT scan of the cervical spine was obtained with 1 mm axial bone algorithm and 2 mm axial soft tissue algorithm images. Sagittal and coronal reconstructed images were obtained.  FINDINGS:  There is no evidence for acute fracture or bony malalignment involving the cervical spine. Mild chronic appearing vertebral body height loss is seen at C3, C4, C5, C6, and C7.  A disc osteophyte complex at C3-4 and C6-7 result in relatively mild degrees of canal narrowing. Mild foraminal narrowing is seen at C3-4 and C4-5 secondary to uncovertebral joint hypertrophy.  Incidental note is made of a partially visualized groundglass abnormality within the visualized right lung apex which measures up to approximately 2.3 x 1.8 cm in greatest axial dimensions within its visualized portions. Similar findings were seen on a prior chest CT dated 9/25/2023. The etiology of this abnormality is unclear and this could be inflammatory or neoplastic. This could be further evaluated with a CT scan of the chest for more comprehensive assessment.  IMPRESSION:  No evidence for acute fracture or bony malalignment involving the cervical spine.  Incidental degenerative phenomena as discussed above.  Incidental note is made of a partially visualized groundglass abnormality within the visualized right lung apex which measures up to approximately 2.3 x 1.8 cm in greatest axial dimensions within its visualized portions. Similar findings were seen on a prior chest CT dated 9/25/2023. The etiology of this abnormality is unclear and this could be inflammatory or neoplastic. This could  be further evaluated with a CT scan of the chest for more comprehensive assessment.  The findings of this report were discussed with Katia Rojas on 10/13/2024 at approximately 12:30 p.m.    Radiation dose reduction techniques were utilized, including automated exposure control and exposure modulation based on body size.  This report was finalized on 10/13/2024 12:34 PM by Dr. Donato Carranza M.D on Workstation: [id number]      XR Wrist 3+ View Left    Result Date: 10/13/2024  No fracture or dislocation.  This report was finalized on 10/13/2024 12:13 PM by Dr. Phani Bustillos M.D on Workstation: [id number]      XR ELBOW 2 VIEW LEFT    Result Date: 10/13/2024  No fracture or dislocation  This report was finalized on 10/13/2024 12:09 PM by Dr. Phani Bustillos M.D on Workstation: [id number]     Microbiology Results (last 10 days)       ** No results found for the last 240 hours. **            Assessment:  Active Hospital Problems    Diagnosis  POA    **Acute subdural hematoma [S06.5XAA]  Yes    Cardiac pacemaker in situ [Z95.0]  Yes    PAF (paroxysmal atrial fibrillation) [I48.0]  Yes    History of CVA (cerebrovascular accident) [Z86.73]  Not Applicable    Benign essential hypertension [I10]  Yes       Medical decision making/care plan: See admitting orders  Acute traumatic subdural hematoma with intact neurological function while patient on anticoagulation therapy-plan is to hold Eliquis, neurochecks and follow-up neurosurgical evaluation is advised.  Paroxysmal atrial fibrillation with sick sinus syndrome status post permanent pacemaker placement in October 2020-hold anticoagulation therapy while her traumatic subdural hematoma is being addressed.  Continue other regimen and provide with telemetry monitoring.  Hypertension-continue antihypertensive regimen and avoid hypotensive episode.  Contusion of the left wrist and hand with x-ray showing no fracture-continue with pain control and physical  therapy.    Lashaun Patel MD   10/13/2024  17:09 EDT    Parts of this note may be an electronic transcription/translation of spoken language to printed text using the Dragon dictation system.

## 2024-10-13 NOTE — ED NOTES
Pt goe out of bed to use the bathroom.  She was dizzy and fell.  She hit her head on the goor frame and injured her left hand.  No loc.  She is not on blood thinners

## 2024-10-13 NOTE — ED NOTES
Nursing report ED to floor  Norma Trejo  89 y.o.  female    HPI :  HPI  Stated Reason for Visit: fall  History Obtained From: patient    Chief Complaint  Chief Complaint   Patient presents with    Fall    Head Injury    Arm Injury       Admitting doctor:   Lashaun Patel MD    Admitting diagnosis:   The primary encounter diagnosis was Acute subdural hematoma. A diagnosis of Contusion of left forearm, initial encounter was also pertinent to this visit.    Code status:   Current Code Status       Date Active Code Status Order ID Comments User Context       Not on file            Allergies:   Patient has no known allergies.    Isolation:   No active isolations    Intake and Output  No intake or output data in the 24 hours ending 10/13/24 1351    Weight:   There were no vitals filed for this visit.    Most recent vitals:   Vitals:    10/13/24 1133 10/13/24 1230 10/13/24 1300 10/13/24 1330   BP: 139/61 144/71 137/66 159/74   BP Location:       Patient Position:       Pulse: 60 60 60 60   Resp: 16  16 16   Temp:       TempSrc:       SpO2:           Active LDAs/IV Access:   Lines, Drains & Airways       Active LDAs       Name Placement date Placement time Site Days    Peripheral IV 10/13/24 1314 Distal;Left;Posterior Forearm 10/13/24  1314  Forearm  less than 1                    Labs (abnormal labs have a star):   Labs Reviewed   PROTIME-INR - Abnormal; Notable for the following components:       Result Value    Protime 15.2 (*)     INR 1.17 (*)     All other components within normal limits   CBC WITH AUTO DIFFERENTIAL - Abnormal; Notable for the following components:    Lymphocyte % 17.7 (*)     Neutrophils, Absolute 7.53 (*)     All other components within normal limits   APTT - Normal   COMPREHENSIVE METABOLIC PANEL   CBC AND DIFFERENTIAL    Narrative:     The following orders were created for panel order CBC & Differential.  Procedure                               Abnormality         Status                      ---------                               -----------         ------                     CBC Auto Differential[[id number]]        Abnormal            Final result                 Please view results for these tests on the individual orders.       EKG:   ECG 12 Lead Other; dizziness   Preliminary Result   HEART RATE=64  bpm   RR Ptkmpaql=104  ms   LA Vgwbfkte=741  ms   P Horizontal Axis=228  deg   P Front Axis=-67  deg   QRSD Cvimeajz=021  ms   QT Grmrstlf=225  ms   GLyP=004  ms   QRS Axis=-53  deg   T Wave Axis=93  deg   - ABNORMAL ECG -   Ventricular-paced complexes   No further analysis attempted due to paced rhythm   Date and Time of Study:2024-10-13 13:16:26          Meds given in ED:   Medications   sodium chloride 0.9 % flush 10 mL (has no administration in time range)       Imaging results:  XR Chest 1 View    Result Date: 10/13/2024  No acute cardiopulmonary process  This report was finalized on 10/13/2024 1:31 PM by Dr. Phani Bustillos M.D on Workstation: [id number]      CT Head Without Contrast    Result Date: 10/13/2024   Small localized acute subdural hematoma measuring up to approximately 4 mm in width and 8 mm in greatest craniocaudad dimensions that is seen along the right aspect of the falx cerebri and medial to the right occipital lobe.   TECHNIQUE: CT scan of the cervical spine was obtained with 1 mm axial bone algorithm and 2 mm axial soft tissue algorithm images. Sagittal and coronal reconstructed images were obtained.  FINDINGS:  There is no evidence for acute fracture or bony malalignment involving the cervical spine. Mild chronic appearing vertebral body height loss is seen at C3, C4, C5, C6, and C7.  A disc osteophyte complex at C3-4 and C6-7 result in relatively mild degrees of canal narrowing. Mild foraminal narrowing is seen at C3-4 and C4-5 secondary to uncovertebral joint hypertrophy.  Incidental note is made of a partially visualized groundglass abnormality within the visualized right  lung apex which measures up to approximately 2.3 x 1.8 cm in greatest axial dimensions within its visualized portions. Similar findings were seen on a prior chest CT dated 9/25/2023. The etiology of this abnormality is unclear and this could be inflammatory or neoplastic. This could be further evaluated with a CT scan of the chest for more comprehensive assessment.  IMPRESSION:  No evidence for acute fracture or bony malalignment involving the cervical spine.  Incidental degenerative phenomena as discussed above.  Incidental note is made of a partially visualized groundglass abnormality within the visualized right lung apex which measures up to approximately 2.3 x 1.8 cm in greatest axial dimensions within its visualized portions. Similar findings were seen on a prior chest CT dated 9/25/2023. The etiology of this abnormality is unclear and this could be inflammatory or neoplastic. This could be further evaluated with a CT scan of the chest for more comprehensive assessment.  The findings of this report were discussed with Katia Rojas on 10/13/2024 at approximately 12:30 p.m.    Radiation dose reduction techniques were utilized, including automated exposure control and exposure modulation based on body size.  This report was finalized on 10/13/2024 12:34 PM by Dr. Donato Carranza M.D on Workstation: [id number]      CT Cervical Spine Without Contrast    Result Date: 10/13/2024   Small localized acute subdural hematoma measuring up to approximately 4 mm in width and 8 mm in greatest craniocaudad dimensions that is seen along the right aspect of the falx cerebri and medial to the right occipital lobe.   TECHNIQUE: CT scan of the cervical spine was obtained with 1 mm axial bone algorithm and 2 mm axial soft tissue algorithm images. Sagittal and coronal reconstructed images were obtained.  FINDINGS:  There is no evidence for acute fracture or bony malalignment involving the cervical spine. Mild chronic appearing  vertebral body height loss is seen at C3, C4, C5, C6, and C7.  A disc osteophyte complex at C3-4 and C6-7 result in relatively mild degrees of canal narrowing. Mild foraminal narrowing is seen at C3-4 and C4-5 secondary to uncovertebral joint hypertrophy.  Incidental note is made of a partially visualized groundglass abnormality within the visualized right lung apex which measures up to approximately 2.3 x 1.8 cm in greatest axial dimensions within its visualized portions. Similar findings were seen on a prior chest CT dated 9/25/2023. The etiology of this abnormality is unclear and this could be inflammatory or neoplastic. This could be further evaluated with a CT scan of the chest for more comprehensive assessment.  IMPRESSION:  No evidence for acute fracture or bony malalignment involving the cervical spine.  Incidental degenerative phenomena as discussed above.  Incidental note is made of a partially visualized groundglass abnormality within the visualized right lung apex which measures up to approximately 2.3 x 1.8 cm in greatest axial dimensions within its visualized portions. Similar findings were seen on a prior chest CT dated 9/25/2023. The etiology of this abnormality is unclear and this could be inflammatory or neoplastic. This could be further evaluated with a CT scan of the chest for more comprehensive assessment.  The findings of this report were discussed with Katia Rojas on 10/13/2024 at approximately 12:30 p.m.    Radiation dose reduction techniques were utilized, including automated exposure control and exposure modulation based on body size.  This report was finalized on 10/13/2024 12:34 PM by Dr. Donato Carranza M.D on Workstation: [id number]      XR Wrist 3+ View Left    Result Date: 10/13/2024  No fracture or dislocation.  This report was finalized on 10/13/2024 12:13 PM by Dr. Phani Bustillos M.D on Workstation: [id number]      XR ELBOW 2 VIEW LEFT    Result Date: 10/13/2024  No fracture  or dislocation  This report was finalized on 10/13/2024 12:09 PM by Dr. Phani Bustillos M.D on Workstation: [id number]       Ambulatory status:   - Stand-by assist    Social issues:   Social History     Socioeconomic History    Marital status:    Tobacco Use    Smoking status: Never    Smokeless tobacco: Never   Vaping Use    Vaping status: Never Used   Substance and Sexual Activity    Alcohol use: Not Currently    Drug use: Never    Sexual activity: Not Currently       Peripheral Neurovascular  Peripheral Neurovascular (Adult)  Peripheral Neurovascular WDL: WDL    Neuro Cognitive  Neuro Cognitive (Adult)  Cognitive/Neuro/Behavioral WDL: WDL    Learning  Learning Assessment  Learning Readiness and Ability: cultural barrier identified, language barrier identified    Respiratory  Respiratory WDL  Respiratory WDL: WDL    Abdominal Pain       Pain Assessments  Pain (Adult)  (0-10) Pain Rating: Rest: 10    NIH Stroke Scale       Katia Gomez RN  10/13/24 13:51 EDT

## 2024-10-14 ENCOUNTER — TELEPHONE (OUTPATIENT)
Dept: NEUROSURGERY | Facility: CLINIC | Age: 89
End: 2024-10-14
Payer: MEDICARE

## 2024-10-14 ENCOUNTER — APPOINTMENT (OUTPATIENT)
Dept: CT IMAGING | Facility: HOSPITAL | Age: 89
End: 2024-10-14
Payer: MEDICARE

## 2024-10-14 DIAGNOSIS — S06.5XAA ACUTE SUBDURAL HEMATOMA: Primary | ICD-10-CM
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
## 2024-10-14 PROBLEM — R73.03 PREDIABETES: Status: ACTIVE | Noted: 2024-10-14

## 2024-10-14 LAB
ALBUMIN SERPL-MCNC: 3.5 G/DL (ref 3.5–5.2)
ALBUMIN/GLOB SERPL: 1.5 G/DL
ALP SERPL-CCNC: 65 U/L (ref 39–117)
ALT SERPL W P-5'-P-CCNC: 29 U/L (ref 1–33)
ANION GAP SERPL CALCULATED.3IONS-SCNC: 10.6 MMOL/L (ref 5–15)
AST SERPL-CCNC: 30 U/L (ref 1–32)
BASOPHILS # BLD AUTO: 0.03 10*3/MM3 (ref 0–0.2)
BASOPHILS NFR BLD AUTO: 0.5 % (ref 0–1.5)
BILIRUB SERPL-MCNC: 0.8 MG/DL (ref 0–1.2)
BUN SERPL-MCNC: 16 MG/DL (ref 8–23)
BUN/CREAT SERPL: 14.7 (ref 7–25)
CALCIUM SPEC-SCNC: 8.9 MG/DL (ref 8.2–9.6)
CHLORIDE SERPL-SCNC: 104 MMOL/L (ref 98–107)
CO2 SERPL-SCNC: 23.4 MMOL/L (ref 22–29)
CREAT SERPL-MCNC: 1.09 MG/DL (ref 0.57–1)
DEPRECATED RDW RBC AUTO: 42.9 FL (ref 37–54)
EGFRCR SERPLBLD CKD-EPI 2021: 47.2 ML/MIN/1.73
EOSINOPHIL # BLD AUTO: 0.26 10*3/MM3 (ref 0–0.4)
EOSINOPHIL NFR BLD AUTO: 4.1 % (ref 0.3–6.2)
ERYTHROCYTE [DISTWIDTH] IN BLOOD BY AUTOMATED COUNT: 12.4 % (ref 12.3–15.4)
GLOBULIN UR ELPH-MCNC: 2.4 GM/DL
GLUCOSE SERPL-MCNC: 104 MG/DL (ref 65–99)
HCT VFR BLD AUTO: 38.6 % (ref 34–46.6)
HGB BLD-MCNC: 12.7 G/DL (ref 12–15.9)
IMM GRANULOCYTES # BLD AUTO: 0.01 10*3/MM3 (ref 0–0.05)
IMM GRANULOCYTES NFR BLD AUTO: 0.2 % (ref 0–0.5)
LYMPHOCYTES # BLD AUTO: 1.9 10*3/MM3 (ref 0.7–3.1)
LYMPHOCYTES NFR BLD AUTO: 29.7 % (ref 19.6–45.3)
MCH RBC QN AUTO: 31.1 PG (ref 26.6–33)
MCHC RBC AUTO-ENTMCNC: 32.9 G/DL (ref 31.5–35.7)
MCV RBC AUTO: 94.4 FL (ref 79–97)
MONOCYTES # BLD AUTO: 0.59 10*3/MM3 (ref 0.1–0.9)
MONOCYTES NFR BLD AUTO: 9.2 % (ref 5–12)
NEUTROPHILS NFR BLD AUTO: 3.6 10*3/MM3 (ref 1.7–7)
NEUTROPHILS NFR BLD AUTO: 56.3 % (ref 42.7–76)
NRBC BLD AUTO-RTO: 0 /100 WBC (ref 0–0.2)
PLATELET # BLD AUTO: 175 10*3/MM3 (ref 140–450)
PMV BLD AUTO: 8.8 FL (ref 6–12)
POTASSIUM SERPL-SCNC: 4.1 MMOL/L (ref 3.5–5.2)
PROT SERPL-MCNC: 5.9 G/DL (ref 6–8.5)
RBC # BLD AUTO: 4.09 10*6/MM3 (ref 3.77–5.28)
SODIUM SERPL-SCNC: 138 MMOL/L (ref 136–145)
WBC NRBC COR # BLD AUTO: 6.39 10*3/MM3 (ref 3.4–10.8)

## 2024-10-14 PROCEDURE — G0378 HOSPITAL OBSERVATION PER HR: HCPCS

## 2024-10-14 PROCEDURE — 80053 COMPREHEN METABOLIC PANEL: CPT | Performed by: INTERNAL MEDICINE

## 2024-10-14 PROCEDURE — 97162 PT EVAL MOD COMPLEX 30 MIN: CPT

## 2024-10-14 PROCEDURE — 97530 THERAPEUTIC ACTIVITIES: CPT

## 2024-10-14 PROCEDURE — 85025 COMPLETE CBC W/AUTO DIFF WBC: CPT | Performed by: INTERNAL MEDICINE

## 2024-10-14 PROCEDURE — 70450 CT HEAD/BRAIN W/O DYE: CPT

## 2024-10-14 RX ADMIN — Medication 10 ML: at 08:26

## 2024-10-14 RX ADMIN — GUAIFENESIN 1200 MG: 600 TABLET, EXTENDED RELEASE ORAL at 21:23

## 2024-10-14 RX ADMIN — Medication 10 ML: at 21:23

## 2024-10-14 RX ADMIN — METOPROLOL TARTRATE 50 MG: 50 TABLET, FILM COATED ORAL at 21:23

## 2024-10-14 RX ADMIN — ACETAMINOPHEN 325MG 650 MG: 325 TABLET ORAL at 08:22

## 2024-10-14 RX ADMIN — PREDNISOLONE ACETATE 1 DROP: 10 SUSPENSION/ DROPS OPHTHALMIC at 14:19

## 2024-10-14 RX ADMIN — HYDROCHLOROTHIAZIDE 12.5 MG: 12.5 TABLET ORAL at 08:23

## 2024-10-14 RX ADMIN — AMLODIPINE BESYLATE 5 MG: 10 TABLET ORAL at 08:23

## 2024-10-14 RX ADMIN — Medication 50 MCG: at 08:23

## 2024-10-14 RX ADMIN — METOPROLOL TARTRATE 50 MG: 50 TABLET, FILM COATED ORAL at 08:23

## 2024-10-14 RX ADMIN — AMITRIPTYLINE HYDROCHLORIDE 37.5 MG: 25 TABLET, FILM COATED ORAL at 21:24

## 2024-10-14 RX ADMIN — Medication 1000 UNITS: at 08:23

## 2024-10-14 RX ADMIN — PREDNISOLONE ACETATE 1 DROP: 10 SUSPENSION/ DROPS OPHTHALMIC at 21:31

## 2024-10-14 RX ADMIN — Medication 5 MG: at 21:23

## 2024-10-14 RX ADMIN — ACETAMINOPHEN 325MG 650 MG: 325 TABLET ORAL at 15:05

## 2024-10-14 RX ADMIN — ACETAMINOPHEN 325MG 650 MG: 325 TABLET ORAL at 21:23

## 2024-10-14 RX ADMIN — ATORVASTATIN CALCIUM 20 MG: 20 TABLET, FILM COATED ORAL at 08:23

## 2024-10-14 NOTE — TELEPHONE ENCOUNTER
----- Message from Manda Casper sent at 10/14/2024 10:23 AM EDT -----  Regarding: needs appointments  Please arrange f/u head CT without contrast for this patient for acute subdural hematoma in approximately 10 days and follow-up thereafter with Dr. Alexandre.  Please contact patient once these appointments are arranged.    Thank you!

## 2024-10-14 NOTE — CONSULTS
[facility] NEUROSURGERY CONSULT NOTE    Patient name: Norma Trejo  Referring Provider: Lashaun Patel MD  Reason for Consultation: fall with traumatic acute subdural hematoma    Patient Care Team:  Sharon Parry APRN as PCP - General (Nurse Practitioner)  Angelina Hebert APRN as Nurse Practitioner (Gastroenterology)  Cindy Daniels MD as Surgeon (Orthopedic Surgery)    Chief complaint: fall with head trauma     Subjective .     History of present illness:    Patient is a 89 y.o. female with a history of CVA, atrial fibrillation on Eliquis at home, cardiac pacemaker, pre-diabetes, chronic cough, frequent pharyngitis, GERD, dysphagia only with eating large bites of food; no dysphagia with liquids, insomnia, headaches/occipital neuralgia for which she sees Dr. Jeremiah Garcia and receives occipital nerve blocks, hypertension, elevated liver enzymes, right shoulder pain treated with a right subacromial bursa injection by orthopedics last July.  The patient presented to [facility] yesterday after sustaining a fall due to an episode of dizziness.  The patient states that she collapsed and fell striking her head on a door frame.  She denied loss of consciousness, seizure, mental status change, nausea, vomiting, fever or chills. She sustained an injury to her left arm as well.  She underwent CT imaging of the head revealing a tiny area of hyperdensity versus acute subdural hematoma. Eliquis is on hold.  Repeat head CT has been performed this morning and will be discussed further below. Neurosurgery has been asked to evaluate and give further recommendations.      Review of Systems  Review of Systems   Constitutional: Negative.  Negative for chills and fever.        Review of systems was completed with the assistance of the patient's daughter as the .   HENT:  Negative for congestion and nosebleeds.    Eyes: Negative.  Negative for photophobia and visual disturbance.   Respiratory:  Negative.  Negative for cough and shortness of breath.    Cardiovascular: Negative.  Negative for chest pain.   Gastrointestinal: Negative.  Negative for nausea and vomiting.   Endocrine: Negative.    Genitourinary: Negative.  Negative for difficulty urinating.   Musculoskeletal: Negative.  Negative for back pain and neck pain.   Allergic/Immunologic: Negative.    Neurological:  Positive for headaches. Negative for seizures and weakness.        The patient has a mild left sided frontal headache as a result of the fall yesterday.  Her occipital headaches have been well-controlled since the last occipital nerve block that she had received outpatient by Dr. Garcia, her neurologist.   Hematological: Negative.    Psychiatric/Behavioral: Negative.     All other systems reviewed and are negative.      History  PAST MEDICAL HISTORY  Past Medical History:   Diagnosis Date    Depression      -  See above for more complete list of medical history.     PAST SURGICAL HISTORY  History reviewed. No pertinent surgical history.    FAMILY HISTORY  Family History   Problem Relation Age of Onset    Kidney disease Mother     Heart attack Father     Heart failure Father     Diabetes Sister     Cancer Sister     No Known Problems Brother     Colon cancer Neg Hx     Colon polyps Neg Hx     Crohn's disease Neg Hx     Irritable bowel syndrome Neg Hx     Ulcerative colitis Neg Hx        SOCIAL HISTORY  Social History     Tobacco Use    Smoking status: Never    Smokeless tobacco: Never   Vaping Use    Vaping status: Never Used   Substance Use Topics    Alcohol use: Not Currently    Drug use: Never     Allergies:  Patient has no known allergies.    MEDICATIONS:  Medications Prior to Admission   Medication Sig Dispense Refill Last Dose/Taking    amitriptyline (ELAVIL) 25 MG tablet 1 tablet. 1.5 tablet a night   10/12/2024    amLODIPine (NORVASC) 2.5 MG tablet TAKE 1 TABLET BY MOUTH DAILY (Patient taking differently: Take 1 tablet by mouth 3 times  a day. Pt taking 2 tablet AM and 1 tab at night) 90 tablet 5 10/13/2024    aspirin 81 MG EC tablet Daily.   10/13/2024    atorvastatin (LIPITOR) 20 MG tablet TAKE 1 TABLET BY MOUTH DAILY 90 tablet 1 10/13/2024    cholecalciferol (VITAMIN D3) 25 MCG (1000 UT) tablet Take 1 tablet by mouth Daily.   10/13/2024    Eliquis 2.5 MG tablet tablet TAKE 1 TABLET BY MOUTH 2 TIMES A  tablet 1 10/13/2024    guaiFENesin (MUCINEX) 600 MG 12 hr tablet Take 2 tablets by mouth 2 (Two) Times a Day.   10/13/2024    hydroCHLOROthiazide 12.5 MG tablet TAKE 1 TABLET BY MOUTH DAILY 90 tablet 1 10/13/2024    metoprolol tartrate (LOPRESSOR) 50 MG tablet Take 1 tablet by mouth 2 (Two) Times a Day. 180 tablet 0 10/13/2024    vitamin B-12 (CYANOCOBALAMIN) 100 MCG tablet Take 0.5 tablets by mouth Daily.   10/13/2024    Vonoprazan Fumarate (Voquezna) 10 MG tablet Take 1 tablet by mouth Daily. 25 tablet 0 10/12/2024    albuterol sulfate  (90 Base) MCG/ACT inhaler Inhale 2 puffs Every 4 (Four) Hours As Needed for Wheezing. 8 g 1     benzonatate (Tessalon Perles) 100 MG capsule Take 1 capsule by mouth 3 (Three) Times a Day As Needed for Cough. (Patient not taking: Reported on 10/9/2024) 30 capsule 0     prednisoLONE acetate (PRED FORTE) 1 % ophthalmic suspension           Current Facility-Administered Medications:     acetaminophen (TYLENOL) tablet 650 mg, 650 mg, Oral, Q6H PRN, Lashaun Patel MD, 650 mg at 10/14/24 0822    albuterol (PROVENTIL) nebulizer solution 0.083% 2.5 mg/3mL, 2.5 mg, Nebulization, Q6H PRN, Lashaun Patel MD    amitriptyline (ELAVIL) tablet 37.5 mg, 37.5 mg, Oral, Nightly, Lashaun Patel MD, 37.5 mg at 10/13/24 7006    amLODIPine (NORVASC) tablet 5 mg, 5 mg, Oral, Q24H, Lashaun Patel MD, 5 mg at 10/14/24 0823    atorvastatin (LIPITOR) tablet 20 mg, 20 mg, Oral, Daily, Lashaun Patel MD, 20 mg at 10/14/24 0823    benzonatate (TESSALON) capsule 100 mg, 100 mg, Oral, TID PRN, Lashaun Patel MD    Calcium Replacement -  Follow Nurse / BPA Driven Protocol, , Does not apply, PRNJessica Jimmy, DO    cholecalciferol (VITAMIN D3) tablet 1,000 Units, 1,000 Units, Oral, Daily, Lashaun Patel MD, 1,000 Units at 10/14/24 0823    guaiFENesin (MUCINEX) 12 hr tablet 1,200 mg, 1,200 mg, Oral, BID, Lashaun Patel MD, 1,200 mg at 10/13/24 2117    hydroCHLOROthiazide tablet 12.5 mg, 12.5 mg, Oral, Daily, Lashaun Patel MD, 12.5 mg at 10/14/24 0823    Magnesium Standard Dose Replacement - Follow Nurse / BPA Driven Protocol, , Does not apply, PRN, Georges Lopez DO    metoprolol tartrate (LOPRESSOR) tablet 50 mg, 50 mg, Oral, BID, Lashaun Patel MD, 50 mg at 10/14/24 0823    nitroglycerin (NITROSTAT) SL tablet 0.4 mg, 0.4 mg, Sublingual, Q5 Min PRN, Lashaun Patel MD    ondansetron (ZOFRAN) injection 4 mg, 4 mg, Intravenous, Q6H PRN, Lashaun Patel MD    Phosphorus Replacement - Follow Nurse / BPA Driven Protocol, , Does not apply, PRJessica FAUST Jimmy, DO    Potassium Replacement - Follow Nurse / BPA Driven Protocol, , Does not apply, PRJessica FAUST Jimmy, DO    prednisoLONE acetate (PRED FORTE) 1 % ophthalmic suspension 1 drop, 1 drop, Both Eyes, Q8H, Lashaun Patel MD, 1 drop at 10/13/24 2124    [CANCELED] Insert Peripheral IV, , , Once **AND** sodium chloride 0.9 % flush 10 mL, 10 mL, Intravenous, PRN, Lashaun Patel MD    sodium chloride 0.9 % flush 10 mL, 10 mL, Intravenous, Q12H, Lashaun Patel MD, 10 mL at 10/14/24 0826    sodium chloride 0.9 % flush 10 mL, 10 mL, Intravenous, PRN, Lashaun Patel MD    vitamin B-12 (CYANOCOBALAMIN) tablet 50 mcg, 50 mcg, Oral, Daily, Lashaun Patel MD, 50 mcg at 10/14/24 0823    Vonoprazan Fumarate (VOQUEZNA) tablet 10 mg, 10 mg, Oral, Daily, Lashaun Patel MD    Objective     Results Review:  LABS:  Results from last 7 days   Lab Units 10/14/24  0627 10/13/24  1314   WBC 10*3/mm3 6.39 10.31   HEMOGLOBIN g/dL 12.7 12.8   HEMATOCRIT % 38.6 38.2   PLATELETS 10*3/mm3 175 188     Results from last 7 days   Lab Units  10/14/24  0627 10/13/24  1408   SODIUM mmol/L 138 139   POTASSIUM mmol/L 4.1 4.6   CHLORIDE mmol/L 104 103   CO2 mmol/L 23.4 25.0   BUN mg/dL 16 17   CREATININE mg/dL 1.09* 1.06*   CALCIUM mg/dL 8.9 9.2   BILIRUBIN mg/dL 0.8 0.7   ALK PHOS U/L 65 70   ALT (SGPT) U/L 29 36*   AST (SGOT) U/L 30 39*   GLUCOSE mg/dL 104* 113*       DIAGNOSTICS:    CT HEAD WO CONTRAST 10/14/2024    Today's head CT again shows a tiny focus of hyperdensity/hemorrhage along the right posterior falx.  This measures approximately 6 mm in size and is unchanged from head CT performed yesterday, 10/13/2024.  No new areas of hemorrhage appreciated.    Results Review:   I reviewed the patient's new clinical results.  I personally viewed and interpreted the patient's head CT from 10/14/2024 and compared it to the previous head CT 1/13/2024. Results discussed with patient and Dr. Alexandre.     Vital Signs   Temp:  [97.7 °F (36.5 °C)-98.6 °F (37 °C)] 98.6 °F (37 °C)  Heart Rate:  [60-76] 67  Resp:  [14-16] 16  BP: (114-159)/(54-74) 139/62    Physical Exam:  Physical Exam  Vitals reviewed.   Constitutional:       General: She is not in acute distress.     Appearance: She is well-developed. She is not ill-appearing, toxic-appearing or diaphoretic.   HENT:      Head: Normocephalic and atraumatic.      Right Ear: External ear normal.      Left Ear: External ear normal.      Nose: Nose normal.      Mouth/Throat:      Mouth: Mucous membranes are moist.   Eyes:      General:         Right eye: No discharge.         Left eye: No discharge.      Extraocular Movements: Extraocular movements intact.      Conjunctiva/sclera: Conjunctivae normal.      Pupils: Pupils are equal, round, and reactive to light.   Neck:      Trachea: No tracheal deviation.   Cardiovascular:      Rate and Rhythm: Normal rate.   Pulmonary:      Effort: Pulmonary effort is normal. No respiratory distress.   Abdominal:      General: There is no distension.      Palpations: Abdomen is soft.       Tenderness: There is no abdominal tenderness.   Musculoskeletal:         General: No tenderness. Normal range of motion.      Cervical back: Normal range of motion and neck supple. No tenderness.   Skin:     General: Skin is warm and dry.      Findings: No erythema.   Neurological:      General: No focal deficit present.      Mental Status: She is alert and oriented to person, place, and time.      GCS: GCS eye subscore is 4. GCS verbal subscore is 5. GCS motor subscore is 6.      Cranial Nerves: No cranial nerve deficit.      Sensory: No sensory deficit.      Motor: No weakness or abnormal muscle tone.      Coordination: Coordination normal.      Deep Tendon Reflexes: Reflexes are normal and symmetric. Reflexes normal.      Comments: AA&O x 3.  PERRL. EOM's intact. Face symmetric. Tongue midline without fasiculations or atrophy. Sensation equal and intact throughout the face.  Hearing is intact bilaterally to finger rustle. Negative pronator drift.  No dysmetria.  No motor or sensory deficits. DTR's normal. Negative Rosario's; negative clonus.  Gait not tested due to fall risk.   Psychiatric:         Mood and Affect: Mood normal.         Behavior: Behavior is cooperative.       Neurological Exam  Mental Status  Alert. Oriented to person, place, and time.    Cranial Nerves  CN III, IV, VI: Extraocular movements intact bilaterally. Pupils equal round and reactive to light bilaterally.    Reflexes  Deep tendon reflexes are 2+ and symmetric in all four extremities.      Assessment & Plan       Acute subdural hematoma    History of CVA (cerebrovascular accident)    Cardiac pacemaker in situ    PAF (paroxysmal atrial fibrillation)    Fall      Problem List Items Addressed This Visit          Unprioritized    * (Principal) Acute subdural hematoma - Primary     Other Visit Diagnoses       Contusion of left forearm, initial encounter                 COMORBID CONDITIONS:  History of CVA, Hypertension, and Atrial  "Fibrillation on Eliquis at home, implanted pacemaker.     PLAN:     I have discussed the repeat head CT results with Dr. Alexandre today.  Given that there is no change in this very tiny hyperdensity vs acute subdural hematoma along the right posterior falx, the patient is cleared from neurosurgical standpoint for discharge at any time when okay with the admitting provider.      The patient should remain off Eliquis until seen in neurosurgery office in 10 - 14 days with a repeat head CT.  Dr. Alexandre's assistant will arrange the head CT and the follow-up appointment.  The patient will be contacted once these appointments are scheduled.    For any further questions or concerns moving forward, please do not hesitate to contact neurosurgery.    I discussed the patient's findings and my recommendations with patient, family, and the patient's daughter who was also interpreting.    During patient visit, I utilized appropriate personal protective equipment including gloves and mask.  Mask used was standard procedure mask. Appropriate PPE was worn during the entire visit.  Hand hygiene was completed before and after.     Manda Casper, APRN  10/14/24  12:48 EDT    \"Dictated utilizing Dragon dictation\".    "

## 2024-10-14 NOTE — PLAN OF CARE
Goal Outcome Evaluation:      89 yo female admitted 10/13 with subdural hematoma. CT head this morning showed hematoma stable. Probable discharge tomorrow. A&OX4, neurologically intact. Complains of some pain to L arm and occasional headache related to fall. [place] speaking, family to remain at bedside. VS stable, room air.

## 2024-10-14 NOTE — PLAN OF CARE
Goal Outcome Evaluation:  Plan of Care Reviewed With: patient, family           Outcome Evaluation: Pt seen for PT genieal this AM. She was admitted to [facility] yesterday after a fall at home hitting her head. Pt w SDH. SHe has hx of A fib, HTN, HLD, depression, and CVA. Pt doing well today, but is c/o L wrist pain. THere is no fx, just bruising. At baseline, pt lives at home w family. She is usually mostly independent w mobility and ADLs. She uses walker for ambulation. Today, pt is up in chair upon entry to room. She is able to stand w SBA/CGA and rwx. Pt ambulated approx 160 ft . No unsteadiness noted. She does veer slightly to the right at times. Pt back in chair at end of session. She plans home at [place] w assist of family. Pt currently near her baseline and has apparently been getting up in room w family assistance. No further acute PT needs at this time. Will sign off. Discussed w RN.    Anticipated Discharge Disposition (PT): home with assist

## 2024-10-14 NOTE — THERAPY EVALUATION
Patient Name: Norma Trejo  : 1930    MRN: [id number]                              Today's Date: 10/14/2024       Admit Date: 10/13/2024    Visit Dx:     ICD-10-CM ICD-9-CM   1. Acute subdural hematoma  S06.5XAA 432.1   2. Contusion of left forearm, initial encounter  S50.12XA 923.10     Patient Active Problem List   Diagnosis    History of CVA (cerebrovascular accident)    Benign essential hypertension    Cardiac pacemaker in situ    Chronic anticoagulation    Hypertensive encephalopathy    PAF (paroxysmal atrial fibrillation)    Sinus bradycardia    Syncope and collapse    Vitamin D deficiency    Herpes labialis    Migraine    Anxiety with depression    Primary insomnia    GERD (gastroesophageal reflux disease)    Acute subdural hematoma    Prediabetes    Fall     Past Medical History:   Diagnosis Date    Depression      History reviewed. No pertinent surgical history.   General Information       Row Name 10/14/24 1352          Physical Therapy Time and Intention    Document Type evaluation;discharge evaluation/summary  -EJ     Mode of Treatment physical therapy  -EJ       Row Name 10/14/24 1353          General Information    Patient Profile Reviewed yes  -EJ     Prior Level of Function independent:;all household mobility;ADL's  family is always home w patient  -EJ     Existing Precautions/Restrictions fall  -EJ     Barriers to Rehab language barrier  -EJ       Row Name 10/14/24 1352          Living Environment    People in Home child(nawaf), adult  -EJ       Row Name 10/14/24 1352          Cognition    Orientation Status (Cognition) oriented x 3  -EJ               User Key  (r) = Recorded By, (t) = Taken By, (c) = Cosigned By      Initials Name Provider Type    EJ Margaret Callahan, PT Physical Therapist                   Mobility       Row Name 10/14/24 1351          Bed Mobility    Comment, (Bed Mobility) up in chair  -EJ       Row Name 10/14/24 1351          Sit-Stand Transfer    Sit-Stand  [place] (Transfers) standby assist  -EJ     Assistive Device (Sit-Stand Transfers) walker, front-wheeled  -EJ       Row Name 10/14/24 1353          Gait/Stairs (Locomotion)    [place] Level (Gait) verbal cues;contact guard  -EJ     Assistive Device (Gait) walker, front-wheeled  -EJ     Distance in Feet (Gait) 160  -EJ     Deviations/Abnormal Patterns (Gait) gely decreased;stride length decreased  -EJ     Comment, (Gait/Stairs) no unsteadiness noted, pt does veer to right at times  -EJ               User Key  (r) = Recorded By, (t) = Taken By, (c) = Cosigned By      Initials Name Provider Type    Margaret Aburto, PT Physical Therapist                   Obj/Interventions       [facility] Name 10/14/24 1355          Range of Motion Comprehensive    General Range of Motion no range of motion deficits identified  -[facility] Name 10/14/24 1355          Strength Comprehensive (MMT)    General Manual Muscle Testing (MMT) Assessment no strength deficits identified  -EJ       Row Name 10/14/24 [facility]5          Balance    Balance Assessment sitting static balance;standing static balance;standing dynamic balance  -EJ     Static Sitting Balance independent  -EJ     Position, Sitting Balance sitting in chair  -EJ     Static Standing Balance supervision  -EJ     Dynamic Standing Balance standby assist;contact guard  -EJ     Position/Device Used, Standing Balance walker, front-wheeled  -EJ               User Key  (r) = Recorded By, (t) = Taken By, (c) = Cosigned By      Initials Name Provider Type    Margaret Aburto, PT Physical Therapist                   Goals/Plan    No documentation.                  Clinical Impression       Row Name 10/14/24 1356          Pain    Pretreatment Pain Rating 5/10  -EJ     Pain Location wrist  -EJ     Pain Side/Orientation left  -       Row Name 10/14/24 1356          Plan of Care Review    Plan of Care Reviewed With patient;family  -     Outcome Evaluation Pt seen for PT  ge this AM. She was admitted to [facility] yesterday after a fall at home hitting her head. Pt w SDH. SHe has hx of A fib, HTN, HLD, depression, and CVA. Pt doing well today, but is c/o L wrist pain. THere is no fx, just bruising. At baseline, pt lives at home w family. She is usually mostly independent w mobility and ADLs. She uses walker for ambulation. Today, pt is up in chair upon entry to room. She is able to stand w SBA/CGA and rwx. Pt ambulated approx 160 ft . No unsteadiness noted. She does veer slightly to the right at times. Pt back in chair at end of session. [place] home at [place] w assist of family. Pt currently near her baseline and has apparently been getting up in room w family assistance. No further acute PT needs at this time. Will sign off. Discussed w RN.  -       Row Name 10/14/24 1359          Therapy Assessment/Plan (PT)    Criteria for Skilled Interventions Met (PT) no problems identified which require skilled intervention  -EJ     Therapy Frequency (PT) evaluation only  -       Row Name 10/14/24 1356          Positioning and Restraints    Pre-Treatment Position sitting in chair/recliner  -EJ     Post Treatment Position chair  -EJ     In Chair notified nsg;sitting;call light within reach;encouraged to call for assist;exit alarm on;with family/caregiver  -               User Key  (r) = Recorded By, (t) = Taken By, (c) = Cosigned By      Initials Name Provider Type    Margaret Aburto, PT Physical Therapist                   Outcome Measures       Row Name 10/14/24 1400          How much help from another person do you currently need...    Turning from your back to your side while in flat bed without using bedrails? 4  -EJ     Moving from lying on back to sitting on the side of a flat bed without bedrails? 4  -EJ     Moving to and from a bed to a chair (including a wheelchair)? 3  -EJ     Standing up from a chair using your arms (e.g., wheelchair, bedside chair)? 3  -EJ     Climbing 3-5  steps with a railing? 3  -EJ     To walk in hospital room? 3  -EJ     AM-PAC 6 Clicks Score (PT) 20  -EJ               User Key  (r) = Recorded By, (t) = Taken By, (c) = Cosigned By      Initials Name Provider Type    Margaret Aburto, PT Physical Therapist                                 Physical Therapy Education        No education to display                  PT Recommendation and Plan     Outcome Evaluation: Pt seen for PT eval this AM. She was admitted to [facility] yesterday after a fall at home hitting her head. Pt w SDH. SHe has hx of A fib, HTN, HLD, depression, and CVA. Pt doing well today, but is c/o L wrist pain. THere is no fx, just bruising. At baseline, pt lives at home w family. She is usually mostly independent w mobility and ADLs. She uses walker for ambulation. Today, pt is up in chair upon entry to room. She is able to stand w SBA/CGA and rwx. Pt ambulated approx 160 ft . No unsteadiness noted. She does veer slightly to the right at times. Pt back in chair at end of session. She plans home at [place] w assist of family. Pt currently near her baseline and has apparently been getting up in room w family assistance. No further acute PT needs at this time. Will sign off. Discussed w RN.     Time Calculation:         PT Charges       Row Name 10/14/24 1400             Time Calculation    Start Time 0915  -EJ      Stop Time 0935  -EJ      Time Calculation (min) 20 min  -EJ      PT Received On 10/14/24  -EJ         Time Calculation- PT    Total Timed Code Minutes- PT 15 minute(s)  -EJ                User Key  (r) = Recorded By, (t) = Taken By, (c) = Cosigned By      Initials Name Provider Type    Margaret Aburto, PT Physical Therapist                  Therapy Charges for Today       Code Description Service Date Service Provider Modifiers Qty    01599625188 HC PT EVAL MOD COMPLEXITY 3 10/14/2024 Margaret Callahan, PT GP 1    [id number] HC PT THERAPEUTIC ACT EA 15 MIN 10/14/2024 Margaret Callahan, PT  GP 1            PT G-Codes  AM-PAC 6 Clicks Score (PT): 20  PT Discharge Summary  Anticipated Discharge Disposition (PT): home with assist    Margaret Callahan, PT  10/14/2024

## 2024-10-14 NOTE — PLAN OF CARE
Problem: Fall Injury Risk  Goal: Absence of Fall and Fall-Related Injury  Intervention: Promote Injury-Free Environment  Recent Flowsheet Documentation  Taken 10/14/2024 0000 by Jazmine Temple RN  Safety Promotion/Fall Prevention:   activity supervised   assistive device/personal items within reach   clutter free environment maintained   safety round/check completed   nonskid shoes/slippers when out of bed  Taken 10/13/2024 2200 by Jazmine Temple, RN  Safety Promotion/Fall Prevention:   activity supervised   assistive device/personal items within reach   clutter free environment maintained   safety round/check completed   nonskid shoes/slippers when out of bed  Taken 10/13/2024 2000 by Jazmine Temple RN  Safety Promotion/Fall Prevention:   activity supervised   assistive device/personal items within reach   clutter free environment maintained   safety round/check completed   nonskid shoes/slippers when out of bed     Problem: Pain Acute  Goal: Optimal Pain Control and Function  Outcome: Progressing   Goal Outcome Evaluation: Patient alert, oriented x 4, laying down in bed. Daughter at the bedside to assist with translation. Patient reporting 3/10 pain in head/left arm, PRN Tylenol given. VSS.V-paced on monitor. Discussed plan for repeat CT head in the AM.

## 2024-10-15 ENCOUNTER — READMISSION MANAGEMENT (OUTPATIENT)
Dept: CALL CENTER | Facility: HOSPITAL | Age: 89
End: 2024-10-15
Payer: MEDICARE

## 2024-10-15 VITALS
DIASTOLIC BLOOD PRESSURE: 63 MMHG | SYSTOLIC BLOOD PRESSURE: 124 MMHG | RESPIRATION RATE: 16 BRPM | OXYGEN SATURATION: 94 % | TEMPERATURE: 97.3 F | HEART RATE: 69 BPM

## 2024-10-15 PROCEDURE — G0378 HOSPITAL OBSERVATION PER HR: HCPCS

## 2024-10-15 RX ADMIN — ATORVASTATIN CALCIUM 20 MG: 20 TABLET, FILM COATED ORAL at 11:23

## 2024-10-15 RX ADMIN — Medication 50 MCG: at 11:23

## 2024-10-15 RX ADMIN — GUAIFENESIN 1200 MG: 600 TABLET, EXTENDED RELEASE ORAL at 11:23

## 2024-10-15 RX ADMIN — METOPROLOL TARTRATE 50 MG: 50 TABLET, FILM COATED ORAL at 11:23

## 2024-10-15 RX ADMIN — HYDROCHLOROTHIAZIDE 12.5 MG: 12.5 TABLET ORAL at 11:23

## 2024-10-15 RX ADMIN — Medication 1000 UNITS: at 11:23

## 2024-10-15 NOTE — OUTREACH NOTE
Prep Survey      Flowsheet Row Responses   [facility] facility patient discharged from? [place]   Is LACE score < 7 ? Yes   Eligibility [facility]   Date of Admission 10/13/24   Date of Discharge 10/15/24   Discharge Disposition Home or Self Care   Discharge diagnosis Subdural hematoma   Does the patient have one of the following disease processes/diagnoses(primary or secondary)? Stroke   Does the patient have Home health ordered? No   Is there a DME ordered? No   Prep survey completed? Yes            REFUGIO A - Registered Nurse

## 2024-10-15 NOTE — CASE MANAGEMENT/SOCIAL WORK
Case Management Discharge Note      Final Note: Home with family---no needs per private auto         Selected Continued Care - Discharged on 10/15/2024 Admission date: 10/13/2024 - Discharge disposition: Home or Self Care      Destination    No services have been selected for the patient.                Durable Medical Equipment    No services have been selected for the patient.                Dialysis/Infusion    No services have been selected for the patient.                Home Medical Care    No services have been selected for the patient.                Therapy    No services have been selected for the patient.                Community Resources    No services have been selected for the patient.                Community & DME    No services have been selected for the patient.                    Transportation Services  Private: Car    Final Discharge Disposition Code: 01 - home or self-care

## 2024-10-15 NOTE — DISCHARGE SUMMARY
Patient Name: Norma Trejo  : 1930  MRN: [id number]    Date of Admission: 10/13/2024  Date of Discharge:  10/15/2024  Primary Care Physician: Sharon Parry APRN      Chief Complaint:   Fall, Head Injury, and Arm Injury      Discharge Diagnoses     Active Hospital Problems    Diagnosis  POA    **Acute subdural hematoma [S06.5XAA]  Yes    Fall [W19.XXXA]  Yes    Prediabetes [R73.03]  Yes    Cardiac pacemaker in situ [Z95.0]  Yes    PAF (paroxysmal atrial fibrillation) [I48.0]  Yes    History of CVA (cerebrovascular accident) [Z86.73]  Not Applicable    Benign essential hypertension [I10]  Yes      Resolved Hospital Problems   No resolved problems to display.        Hospital Course     Ms. Trejo is a 89 y.o. female with a history of HTN, CVA, atrial fibrillation, prediabetes who presented to [facility] initially after suffering a fall prior to arrival.  Please see the admitting history and physical for further details.  She was found to have subdural hematoma and was admitted to the hospital for further evaluation and treatment.     Mechanical fall  Subdural hematoma  - Fall prior to arrival, imaging obtained and reviewed, showing subdural hematoma. Neurosurgery consulted and evaluated, repeat imaging was obtained and appeared to be relatively unchanged, and because of stability, neurosurgery cleared patient for discharge, with plans for close outpatient follow up in 10-14 days for repeat imaging and reassessment, they noted that their office will call to schedule appointment. Per neurosurgery recommendations, continue to hold Eliquis and aspirin at discharge, until neurosurgery follow up. Defer decisions regarding resuming eliquis and aspirin to neurosurgery and PCP. Physical therapy evaluated and patient is to return home with family at time of discharge.    Pain in left arm  - Imaging negative for fracture or dislocation. Physical therapy evaluated, pain improved on  repeat examination prior to discharge. Recommend assistance with changing in position, ambulation and close monitoring, implementation of fall precautions at home to decrease risk of subsequent falls.     Atrial fibrillation on long term anticoagulation  History of pacemaker  - [id number] score: 4. Rate appears controlled, continue Metoprolol as previously prescribed, holding Eliquis at discharge per neurosurgery recommendations until they have outpatient follow up for reassessment and repeat imaging to determine if this is safe to resume, defer decisions regarding resuming eliquis to neurosurgery and PCP.    Hypertension  - Blood pressure appears stable and acceptable acutely at this time. No indications to warrant acute changes/intervention at this time. Continue current medications as prescribed. Recommend that patient check blood pressure 2-3 times daily and record those values in log book and take it to next PCP visit to allow for greater insight into treatment efficacy to guide further management decisions. Recommend heart healthy/low sodium diet. Recommend close follow up with PCP within 1 week after discharge for reassessment to guide ongoing management decisions.     Prediabetes with Hyperglycemia  - Glucose elevated on most recent labs. Patient without known history of T2DM.  Most recent hemoglobin A1c 5.80% (02/29/24). Follow up with PCP for reassessment of glucose values.     Chronic kidney disease stage 3A  - On most recent labs, patient's creatinine found to be stable and near apparent baseline (around ~1.1 on average), per review of previous lab values and outside records. Recommend repeat BMP with PCP within 1 week for reassessment.     History of CVA  - Holding aspirin until follow up with neurosurgery, continue statin.    Day of Discharge     Subjective:  Patient seen and examined this morning.  Hospital day 2.  She is resting in bed, family present at bedside.  She is doing well at present, no  acute complaints.  She has been cleared for discharge by neurosurgery with plans for close outpatient follow-up    Physical Exam:  Temp:  [97.9 °F (36.6 °C)-98.6 °F (37 °C)] 97.9 °F (36.6 °C)  Heart Rate:  [60-68] 60  Resp:  [14-16] 16  BP: (109-150)/(50-67) 109/50  There is no height or weight on file to calculate BMI.  Physical Exam  Vitals and nursing note reviewed.   Constitutional:       General: She is not in acute distress.     Comments: Elderly, chronically ill appearing   HENT:      Head: Atraumatic.   Eyes:      Extraocular Movements: Extraocular movements intact.      Conjunctiva/sclera: Conjunctivae normal.   Cardiovascular:      Rate and Rhythm: Normal rate and regular rhythm.      Pulses: Normal pulses.      Heart sounds: Normal heart sounds.   Pulmonary:      Effort: Pulmonary effort is normal. No respiratory distress.      Breath sounds: Normal breath sounds.   Abdominal:      General: Bowel sounds are normal.      Palpations: Abdomen is soft.      Tenderness: There is no abdominal tenderness.   Musculoskeletal:      Right lower leg: No edema.      Left lower leg: No edema.   Skin:     General: Skin is warm and dry.   Neurological:      Mental Status: She is alert.         Consultants     Consult Orders (all) (From admission, onward)       Start     Ordered    10/13/24 1321  [facility] (on-call MD unless specified) Details  Once        Specialty:  Hospitalist  Provider:  Lashaun Patel MD    10/13/24 1320    10/13/24 1301  Neurosurgery (on-call MD unless specified)  Once        Specialty:  Neurosurgery  Provider:  Mayco Alexandre MD    10/13/24 1300                  Procedures     * Surgery not found *    Imaging Results (All)       Procedure Component Value Units Date/Time    CT Head Without Contrast [[id number]] Collected: 10/14/24 0858     Updated: 10/14/24 0858    Narrative:      CT OF THE BRAIN WITHOUT CONTRAST 10/14/2024     HISTORY: Subdural hematoma.     Axial images were obtained through the  brain without intravenous  contrast.     The previous CT of the brain dated 10/13/2024 is compared.     Again seen is a tiny focus of hyperdense hemorrhage along the rightward  aspect of the posterior falx best seen on axial image 36. It measures up  to approximately 6 mm in size and appears relatively stable when  compared to yesterday's study.     No new areas of hemorrhage are seen. No midline shift is seen. There is  mild diffuse atrophy. There is decreased attenuation of the  periventricular white matter bilaterally consistent with small vessel  white matter ischemic disease. There is no evidence of acute infarction.     No bony abnormalities are seen.       Impression:      1. Stable small subdural hematoma along the posterior falx, unchanged  from the 10/13/2024 study.  2. No new areas of hemorrhage are seen.     Radiation dose reduction techniques were utilized, including automated  exposure control and exposure modulation based on body size.          XR Chest 1 View [[id number]] Collected: 10/13/24 1328     Updated: 10/13/24 1334    Narrative:      XR CHEST 1 VW-        INDICATION: Subdural hematoma, fall     COMPARISON: Chest radiograph August 4, 2023     TECHNIQUE: 1 view chest     FINDINGS:      No focal opacity. No effusions. Normal mediastinal contour. Heart is  normal in size. Left-sided pacemaker with a right atrial and right  ventricular lead.       Impression:      No acute cardiopulmonary process     This report was finalized on 10/13/2024 1:31 PM by Dr. Phani Bustillos M.D on Workstation: [id number]       CT Head Without Contrast [[id number]] Collected: 10/13/24 1220     Updated: 10/13/24 1237    Narrative:      CT HEAD AND CERVICAL SPINE WITHOUT CONTRAST     CLINICAL HISTORY: fall, head injury.     TECHNIQUE: CT scan of the head was obtained with 3 mm axial soft tissue  and 2 mm bone algorithm images. No intravenous contrast was  administered. Sagittal and coronal reconstructions were  obtained.     COMPARISON: None.     FINDINGS:       There is no evidence for a calvarial fracture. However, there is a small  amount of acute subdural hematoma along the right aspect of the falx  cerebri medial to the right occipital lobe. This relatively localized  subdural hematoma measures up to approximately 4 mm in width and  approximately 8 mm in greatest craniocaudad dimensions.     Mild to moderate changes of chronic small vessel ischemic phenomena are  appreciated. Otherwise, the ventricles, sulci, and cisterns are  age-appropriate. The gray-white matter differentiation is within normal  limits. The basal ganglia and thalami are unremarkable in appearance.  The posterior fossa structures are within normal limits.     There is a small left lateral parietal scalp hematoma.     Incidental note is made of a chronic appearing deformity within the  medial wall the left orbit. Mild mucosal thickening is identified within  the ethmoid air cells.       Impression:         Small localized acute subdural hematoma measuring up to approximately 4  mm in width and 8 mm in greatest craniocaudad dimensions that is seen  along the right aspect of the falx cerebri and medial to the right  occipital lobe.        TECHNIQUE: CT scan of the cervical spine was obtained with 1 mm axial  bone algorithm and 2 mm axial soft tissue algorithm images. Sagittal and  coronal reconstructed images were obtained.     FINDINGS:     There is no evidence for acute fracture or bony malalignment involving  the cervical spine. Mild chronic appearing vertebral body height loss is  seen at C3, C4, C5, C6, and C7.     A disc osteophyte complex at C3-4 and C6-7 result in relatively mild  degrees of canal narrowing. Mild foraminal narrowing is seen at C3-4 and  C4-5 secondary to uncovertebral joint hypertrophy.     Incidental note is made of a partially visualized groundglass  abnormality within the visualized right lung apex which measures up  to  approximately 2.3 x 1.8 cm in greatest axial dimensions within its  visualized portions. Similar findings were seen on a prior chest CT  dated 9/25/2023. The etiology of this abnormality is unclear and this  could be inflammatory or neoplastic. This could be further evaluated  with a CT scan of the chest for more comprehensive assessment.     IMPRESSION:     No evidence for acute fracture or bony malalignment involving the  cervical spine.     Incidental degenerative phenomena as discussed above.     Incidental note is made of a partially visualized groundglass  abnormality within the visualized right lung apex which measures up to  approximately 2.3 x 1.8 cm in greatest axial dimensions within its  visualized portions. Similar findings were seen on a prior chest CT  dated 9/25/2023. The etiology of this abnormality is unclear and this  could be inflammatory or neoplastic. This could be further evaluated  with a CT scan of the chest for more comprehensive assessment.     The findings of this report were discussed with Katia Rojas on  10/13/2024 at approximately 12:30 p.m.           Radiation dose reduction techniques were utilized, including automated  exposure control and exposure modulation based on body size.     This report was finalized on 10/13/2024 12:34 PM by Dr. Donato Carranza M.D on Workstation: [id number]       CT Cervical Spine Without Contrast [[id number]] Collected: 10/13/24 1220     Updated: 10/13/24 1237    Narrative:      CT HEAD AND CERVICAL SPINE WITHOUT CONTRAST     CLINICAL HISTORY: fall, head injury.     TECHNIQUE: CT scan of the head was obtained with 3 mm axial soft tissue  and 2 mm bone algorithm images. No intravenous contrast was  administered. Sagittal and coronal reconstructions were obtained.     COMPARISON: None.     FINDINGS:       There is no evidence for a calvarial fracture. However, there is a small  amount of acute subdural hematoma along the right aspect of the  falx  cerebri medial to the right occipital lobe. This relatively localized  subdural hematoma measures up to approximately 4 mm in width and  approximately 8 mm in greatest craniocaudad dimensions.     Mild to moderate changes of chronic small vessel ischemic phenomena are  appreciated. Otherwise, the ventricles, sulci, and cisterns are  age-appropriate. The gray-white matter differentiation is within normal  limits. The basal ganglia and thalami are unremarkable in appearance.  The posterior fossa structures are within normal limits.     There is a small left lateral parietal scalp hematoma.     Incidental note is made of a chronic appearing deformity within the  medial wall the left orbit. Mild mucosal thickening is identified within  the ethmoid air cells.       Impression:         Small localized acute subdural hematoma measuring up to approximately 4  mm in width and 8 mm in greatest craniocaudad dimensions that is seen  along the right aspect of the falx cerebri and medial to the right  occipital lobe.        TECHNIQUE: CT scan of the cervical spine was obtained with 1 mm axial  bone algorithm and 2 mm axial soft tissue algorithm images. Sagittal and  coronal reconstructed images were obtained.     FINDINGS:     There is no evidence for acute fracture or bony malalignment involving  the cervical spine. Mild chronic appearing vertebral body height loss is  seen at C3, C4, C5, C6, and C7.     A disc osteophyte complex at C3-4 and C6-7 result in relatively mild  degrees of canal narrowing. Mild foraminal narrowing is seen at C3-4 and  C4-5 secondary to uncovertebral joint hypertrophy.     Incidental note is made of a partially visualized groundglass  abnormality within the visualized right lung apex which measures up to  approximately 2.3 x 1.8 cm in greatest axial dimensions within its  visualized portions. Similar findings were seen on a prior chest CT  dated 9/25/2023. The etiology of this abnormality is  unclear and this  could be inflammatory or neoplastic. This could be further evaluated  with a CT scan of the chest for more comprehensive assessment.     IMPRESSION:     No evidence for acute fracture or bony malalignment involving the  cervical spine.     Incidental degenerative phenomena as discussed above.     Incidental note is made of a partially visualized groundglass  abnormality within the visualized right lung apex which measures up to  approximately 2.3 x 1.8 cm in greatest axial dimensions within its  visualized portions. Similar findings were seen on a prior chest CT  dated 9/25/2023. The etiology of this abnormality is unclear and this  could be inflammatory or neoplastic. This could be further evaluated  with a CT scan of the chest for more comprehensive assessment.     The findings of this report were discussed with Katia Rojas on  10/13/2024 at approximately 12:30 p.m.           Radiation dose reduction techniques were utilized, including automated  exposure control and exposure modulation based on body size.     This report was finalized on 10/13/2024 12:34 PM by Dr. Donato Carranza M.D on Workstation: [id number]       XR Wrist 3+ View Left [[id number]] Collected: 10/13/24 1211     Updated: 10/13/24 1216    Narrative:      XR WRIST 3+ VW LEFT-        INDICATION: Fall, pain     COMPARISON: None     TECHNIQUE: 3 view left wrist     FINDINGS:      No fracture. No bone lesion. No dislocation. Preserved joint spaces.  Dorsal forearm and wrist soft tissue swelling.       Impression:      No fracture or dislocation.     This report was finalized on 10/13/2024 12:13 PM by Dr. Phani Bustillos M.D on Workstation: [id number]       XR ELBOW 2 VIEW LEFT [[id number]] Collected: 10/13/24 1208     Updated: 10/13/24 1212    Narrative:      XR ELBOW 2 VW LEFT-        INDICATION: Fall, pain     COMPARISON: None     TECHNIQUE: 2 view left elbow     FINDINGS:      No fracture. No bone lesion. No dislocation. No  "effusion seen.       Impression:      No fracture or dislocation     This report was finalized on 10/13/2024 12:09 PM by Dr. Phani Bustillos M.D on Workstation: [id number]                 Pertinent Labs     Results from last 7 days   Lab Units 10/14/24  0627 10/13/24  1314   WBC 10*3/mm3 6.39 10.31   HEMOGLOBIN g/dL 12.7 12.8   PLATELETS 10*3/mm3 175 188     Results from last 7 days   Lab Units 10/14/24  0627 10/13/24  1408   SODIUM mmol/L 138 139   POTASSIUM mmol/L 4.1 4.6   CHLORIDE mmol/L 104 103   CO2 mmol/L 23.4 25.0   BUN mg/dL 16 17   CREATININE mg/dL 1.09* 1.06*   GLUCOSE mg/dL 104* 113*   EGFR mL/min/1.73 47.2* 48.8*     Results from last 7 days   Lab Units 10/14/24  0627 10/13/24  1408   ALBUMIN g/dL 3.5 3.9   BILIRUBIN mg/dL 0.8 0.7   ALK PHOS U/L 65 70   AST (SGOT) U/L 30 39*   ALT (SGPT) U/L 29 36*     Results from last 7 days   Lab Units 10/14/24  0627 10/13/24  1408   CALCIUM mg/dL 8.9 9.2   ALBUMIN g/dL 3.5 3.9               Invalid input(s): \"LDLCALC\"          Test Results Pending at Discharge     Pending Results       None              Discharge Details        Discharge Medications        Continue These Medications        Instructions Start Date   albuterol sulfate  (90 Base) MCG/ACT inhaler  Commonly known as: PROVENTIL HFA;VENTOLIN HFA;PROAIR HFA   2 puffs, Inhalation, Every 4 Hours PRN      amitriptyline 25 MG tablet  Commonly known as: ELAVIL   1 tablet. 1.5 tablet a night      amLODIPine 2.5 MG tablet  Commonly known as: NORVASC   2.5 mg, Oral, Daily      atorvastatin 20 MG tablet  Commonly known as: LIPITOR   20 mg, Oral, Daily      cholecalciferol 25 MCG (1000 UT) tablet  Commonly known as: VITAMIN D3   1,000 Units, Daily      guaiFENesin 600 MG 12 hr tablet  Commonly known as: MUCINEX   1,200 mg, 2 Times Daily      hydroCHLOROthiazide 12.5 MG tablet   12.5 mg, Oral, Daily      metoprolol tartrate 50 MG tablet  Commonly known as: LOPRESSOR   50 mg, Oral, 2 Times Daily    "   prednisoLONE acetate 1 % ophthalmic suspension  Commonly known as: PRED FORTE       vitamin B-12 100 MCG tablet  Commonly known as: CYANOCOBALAMIN   50 mcg, Daily      Voquezna 10 MG tablet  Generic drug: Vonoprazan Fumarate   10 mg, Oral, Daily             Stop These Medications      aspirin 81 MG EC tablet     Eliquis 2.5 MG tablet tablet  Generic drug: apixaban            ASK your doctor about these medications        Instructions Start Date   benzonatate 100 MG capsule  Commonly known as: Tessalon Perles   100 mg, Oral, 3 Times Daily PRN               No Known Allergies    Discharge Disposition:  Home or Self Care      Discharge Diet:  Diet Order   Procedures    Diet: Regular/House; Fluid Consistency: Thin (IDDSI 0)       Discharge Activity:   Activity Instructions       Up WIth Assist              CODE STATUS:    Code Status and Medical Interventions: CPR (Attempt to Resuscitate); Full Support   Ordered at: 10/13/24 1712     Code Status (Patient has no pulse and is not breathing):    CPR (Attempt to Resuscitate)     Medical Interventions (Patient has pulse or is breathing):    Full Support       Future Appointments   Date Time Provider Department Center   10/21/2024 11:00 AM [facility] CT 2 [facility]   10/29/2024 11:45 AM Mayco Alexandre MD [facility]   10/31/2024  9:00 AM Jeremiah Garcia II, MD [facility]     Additional Instructions for the Follow-ups that You Need to Schedule       Discharge Follow-up with PCP   As directed       Currently Documented PCP:    Sharon Parry APRN    PCP Phone Number:    [phone]     Follow Up Details: within 1 week for reassessment, medication and symptom review, blood pressure and glucose check, BMP and CBC        Discharge Follow-up with Specialty: Neurosurgery   As directed      Specialty: Neurosurgery   Follow Up Details: 10-14 days per their notes, they stated they would call to schedule an appointment with you               Follow-up Information        Sharon Parry APRN .    Specialties: Nurse Practitioner, Internal Medicine  Why: within 1 week for reassessment, medication and symptom review, blood pressure and glucose check, BMP and CBC  Contact information:  [address]  [place] [id number] [phone]                             Additional Instructions for the Follow-ups that You Need to Schedule       Discharge Follow-up with PCP   As directed       Currently Documented PCP:    Sharon Parry APRN    PCP Phone Number:    [phone]     Follow Up Details: within 1 week for reassessment, medication and symptom review, blood pressure and glucose check, BMP and CBC        Discharge Follow-up with Specialty: Neurosurgery   As directed      Specialty: Neurosurgery   Follow Up Details: 10-14 days per their notes, they stated they would call to schedule an appointment with you            Time Spent on Discharge:  Greater than 30 minutes      Georges Lopez DO  [facility]  10/15/24  07:53 EDT

## 2024-10-15 NOTE — PLAN OF CARE
Problem: Adult Inpatient Plan of Care  Goal: Plan of Care Review  Outcome: Progressing     Problem: Adult Inpatient Plan of Care  Goal: Absence of Hospital-Acquired Illness or Injury  Intervention: Identify and Manage Fall Risk  Recent Flowsheet Documentation  Taken 10/15/2024 0000 by Jazmine Temple RN  Safety Promotion/Fall Prevention:   activity supervised   assistive device/personal items within reach   clutter free environment maintained   safety round/check completed   nonskid shoes/slippers when out of bed  Taken 10/14/2024 2200 by Jazmine Temple RN  Safety Promotion/Fall Prevention:   activity supervised   assistive device/personal items within reach   clutter free environment maintained   safety round/check completed   nonskid shoes/slippers when out of bed  Taken 10/14/2024 2000 by Jazmine Temple RN  Safety Promotion/Fall Prevention:   activity supervised   assistive device/personal items within reach   clutter free environment maintained   safety round/check completed   nonskid shoes/slippers when out of bed     Problem: Adult Inpatient Plan of Care  Goal: Absence of Hospital-Acquired Illness or Injury  Intervention: Prevent and Manage VTE (Venous Thromboembolism) Risk  Recent Flowsheet Documentation  Taken 10/14/2024 2000 by Jazmine Temple RN  VTE Prevention/Management:   SCDs (sequential compression devices) off   patient refused intervention     Problem: Adult Inpatient Plan of Care  Goal: Absence of Hospital-Acquired Illness or Injury  Intervention: Prevent Skin Injury  Recent Flowsheet Documentation  Taken 10/15/2024 0000 by Jazmine Temple RN  Body Position: position changed independently  Taken 10/14/2024 2200 by Jazmine Temple RN  Body Position: position changed independently  Taken 10/14/2024 2000 by Jazmine Temple RN  Body Position: position changed independently  Skin Protection: incontinence pads utilized     Problem: Pain Acute  Goal: Optimal Pain Control and Function  Outcome:  Progressing  Intervention: Optimize Psychosocial Wellbeing  Recent Flowsheet Documentation  Taken 10/14/2024 2000 by Jazmine Temple RN  Supportive Measures: active listening utilized  Diversional Activities: television  Intervention: Develop Pain Management Plan  Recent Flowsheet Documentation  Taken 10/14/2024 2000 by Jazmine Temple RN  Pain Management Interventions:   cold applied   pillow support provided  Intervention: Prevent or Manage Pain  Recent Flowsheet Documentation  Taken 10/14/2024 2000 by Jazmine Temple RN  Sensory Stimulation Regulation:   auditory stimulation minimized   lighting decreased   care clustered  Sleep/Rest Enhancement:   awakenings minimized   room darkened   noise level reduced  Medication Review/Management: medications reviewed   Goal Outcome Evaluation: Patient alert, sitting up in bed talking with daughter. Reports pain in left arm. Tylenol PRN and ice pack given. Discussed plans to work with PT and likely discharge home tomorrow. Resting well tonight. VSS

## 2024-10-16 ENCOUNTER — TRANSITIONAL CARE MANAGEMENT TELEPHONE ENCOUNTER (OUTPATIENT)
Dept: CALL CENTER | Facility: HOSPITAL | Age: 89
End: 2024-10-16
Payer: MEDICARE

## 2024-10-16 NOTE — OUTREACH NOTE
Call Center TCM Note      Flowsheet Row Responses   [facility] patient discharged from? [place]   Does the patient have one of the following disease processes/diagnoses(primary or secondary)? Stroke   TCM attempt successful? Yes   Call start time 1314   Call end time 1322   Discharge diagnosis Subdural hematoma   Is patient permission given to speak with other caregiver? Yes   Person spoke with today (if not patient) and relationship Dtr Theresa   Meds reviewed with patient/caregiver? Yes   Is the patient having any side effects they believe may be caused by any medication additions or changes? No   Does the patient have all medications ordered at discharge? Yes   Prescription comments No new rx's at this hospital discharge. AVS states pt to discontinue asa, Eliquis for now. Per dtr discharge RN told her MD said to discontinue ONLY Eliquis so pt is taking asa.   Is the patient taking all medications as directed (includes completed medication regime)? Yes   Comments TCM APPT WITH PCP ELSA CHRIS IS 10/25/2024   Does the patient have an appointment with their PCP within 7-14 days of discharge? Yes   Has home health visited the patient within 72 hours of discharge? N/A   Psychosocial issues? No   Does the patient require any assistance with activities of daily living such as eating, bathing, dressing, walking, etc.? No   Does the patient have any residual symptoms from stroke/TIA? No   Does the patient understand the diet ordered at discharge? Yes   Did the patient receive a copy of their discharge instructions? Yes   Nursing interventions Reviewed instructions with patient   What is the patient's perception of their health status since discharge? Improving   Nursing interventions Nurse provided patient education   Is the patient/caregiver able to teach back the risk factors for a stroke? High blood pressure-goal below 120/80, Carotid or other artery disease, Illegal drug use, Sleep apnea, History of  TIAs, Smoking, Diabetes, History of Afib, HIgh red blood cell count, High Cholesterol, Physical inactivity and obesity, Excessive alcohol intake   Is the patient/caregiver able to teach back signs and symptoms related to disease process for when to call PCP? Yes   Is the patient/caregiver able to teach back signs and symptoms related to disease process for when to call 911? Yes   If the patient is a current smoker, are they able to teach back resources for cessation? Not a smoker   Is the patient/caregiver able to teach back the hierarchy of who to call/visit for symptoms/problems? PCP, Specialist, Home health nurse, Urgent Care, ED, 911 Yes   Is the patient able to teach back FAST for Stroke? B alance: Watch for sudden loss of balance, E yes: Check for vision loss, F ace: Look for an uneven smile, A rm: Check if one arm is weak, S peech: Listen for slurred speech, T ronald: Call 9-1-1 right away   TCM call completed? Yes   Wrap up additional comments D/C DX: Subdural hematoma from fall - Per dtr Theresa pt doing pretty well, still sore from the fall. **Please see above notes in Medication section** No questions. Repeat CT 10/21, [facility] Follow up 10/29/2024. TCM APPT with PCP ELSA Parry is 10/25/2024. **Per AVS pt will need BP and glucose check, BMP, CBC. Per dtr pt will be fasting for this appt.   Call end time 1322            Bev Gudino MA    10/16/2024, 13:27 EDT

## 2024-10-21 ENCOUNTER — HOSPITAL ENCOUNTER (OUTPATIENT)
Dept: CT IMAGING | Facility: HOSPITAL | Age: 89
Discharge: HOME | End: 2024-10-21
Admitting: NURSE PRACTITIONER
Payer: MEDICARE

## 2024-10-21 DIAGNOSIS — S06.5XAA ACUTE SUBDURAL HEMATOMA: ICD-10-CM

## 2024-10-21 PROCEDURE — 70450 CT HEAD/BRAIN W/O DYE: CPT

## 2024-10-22 ENCOUNTER — TELEPHONE (OUTPATIENT)
Dept: INTERNAL MEDICINE | Facility: CLINIC | Age: 89
End: 2024-10-22
Payer: MEDICARE

## 2024-10-22 ENCOUNTER — TELEPHONE (OUTPATIENT)
Dept: NEUROLOGY | Facility: OTHER | Age: 89
End: 2024-10-22
Payer: MEDICARE

## 2024-10-22 NOTE — TELEPHONE ENCOUNTER
URGENT}    Medication requested (name and dose):      amitriptyline (ELAVIL) 25 MG tablet    1 tablet. 1.5 tablet a night, [facility]     Pharmacy where request should be sent:       [facility] [id number] - [facility] [id number] [address]  AT [facility] [address] [facility] [phone] [facility] [phone]      Additional details provided by patient:      Best call back number:  [phone]    Does the patient have less than a 3 day supply:  [x] Yes  [] No    Amy Ochoa Rep  10/22/24, 10:09 EDT

## 2024-10-22 NOTE — PROGRESS NOTES
This was called to me by reading radiologist. As we had suspected this was not an acute hemorrhage. Determined to be a chronic localized density confirmed on outside head CT from 2 years ago.

## 2024-10-22 NOTE — TELEPHONE ENCOUNTER
Caller: MIMI LANDAVERDE    Relationship: Emergency Contact    Best call back number:     [phone] (Home)       What is the best time to reach you: ANY     Who are you requesting to speak with (clinical staff, provider,  specific staff member): CLINICAL     Do you know the name of the person who called: N/A     What was the call regarding: PATIENTS DAUGHTER HAS QUESTIONS ABOUT HER ELLIQUIS AFTER A SCAN THE PATIENT HAD DONE AT [facility] [place] operated by [facility] YESTERDAY     Is it okay if the provider responds through MyChart: NO

## 2024-10-22 NOTE — TELEPHONE ENCOUNTER
I suggest pt's daughter to call the ordering physical to get the Ct result from yesterday as donal is off today. Daughter have their number.

## 2024-10-24 ENCOUNTER — READMISSION MANAGEMENT (OUTPATIENT)
Dept: CALL CENTER | Facility: HOSPITAL | Age: 89
End: 2024-10-24
Payer: MEDICARE

## 2024-10-24 NOTE — PROGRESS NOTES
"Subjective   History of Present Illness: Norma Trejo is a 89 y.o. female is here today for follow-up after E.D visit on 10/13/24.  She denies any persistent headache, decreased appetite or any other sequela of a possible postconcussive syndrome.  Her biggest complaint today is that her left wrist still hurts and she is having trouble sleeping because of her left wrist pain.  If it does not improve in 2 weeks she is supposed to follow-up with a hand specialist.  Her daughter artery resume the Eliquis after she completed the CT scan of the head last week.  Her daughter provided translation to Tongan today    History of Present Illness    Tobacco Use: Low Risk  (10/29/2024)    Patient History     Smoking Tobacco Use: Never     Smokeless Tobacco Use: Never     Passive Exposure: Not on file        The following portions of the patient's history were reviewed and updated as appropriate: allergies, current medications, past family history, past medical history, past social history, past surgical history and problem list.    Review of Systems   Constitutional:  Negative for fever.   Musculoskeletal:  Positive for gait problem.   Neurological:  Positive for dizziness. Negative for weakness.   All other systems reviewed and are negative.      Objective     Vitals:    10/29/24 1202   BP: 118/60   BP Location: Left arm   Patient Position: Sitting   Cuff Size: Adult   Resp: 20   Weight: 66.2 kg (146 lb)   Height: 152.4 cm (60\")   PainSc: 0-No pain     Body mass index is 28.51 kg/m².      Physical Exam  Neurological Exam    Physical Exam:    CONSTITUTIONAL:  appears well developed, well-nourished and in no acute distress.    NECK: the neck is supple and symmetric. The trachea is midline with no masses.      PULMONARY: Respiratory effort is normal with no increased work of breathing or signs of respiratory distress.    CARDIOVASCULAR: Examination of the extremities shows no edema or varicosities.    MUSCULOSKELETAL: Gait " not checked today she came in a wheelchair without her walker.  She is wearing a wrist splint on the left wrist    SKIN: The skin is warm, dry and intact.      NEUROLOGIC:   Cranial nerves II through XII are grossly intact  Normal motor strength noted without ulnar drift. Muscle bulk and tone are normal.  Sensory exam is normal to fine touch  Cortical function is intact and without deficits. Speech is normal.    PSYCHIATRIC: oriented to person, place and time. Patient's mood and affect are normal.    Assessment & Plan   Independent Review of Radiographic Studies:      I personally reviewed the images from the following studies.    CT scan of the head done on October 21, 2024 at [facility] reveals no acute intracranial abnormality with no evidence of any subdural hematoma.  There is an area of dural thickening when compared to an outside CT from November 2022 that shows mineralization suggesting benign dural thickening and no suggestion of SDH.    Medical Decision Making:      Follow-up CT documents that there is actually no intracranial hemorrhage.  The intracranial abnormality is considered dural thickening with calcification/mineralization suggesting a chronic change over time and no active bleeding or subdural hematoma is suspected.  This would be considered a variation of normal and does not require neurosurgical intervention or further radiographic follow-up.  Since there is no evidence of intracranial hemorrhage she can resume Eliquis from the neurosurgical standpoint if medically necessary.    Return for any new or recurrent neurosurgical problems.    Diagnoses and all orders for this visit:    1. Status post fall (Primary)    2. Current use of long term anticoagulation             Mayco Alexandre MD [facility]  [facility]rological [facility]

## 2024-10-24 NOTE — OUTREACH NOTE
Stroke Week 2 Survey      Flowsheet Row Responses   [facility] patient discharged from? [place]   Does the patient have one of the following disease processes/diagnoses(primary or secondary)? Stroke   Week 2 attempt successful? Yes   Call start time 1135   Call end time 1140   Discharge diagnosis Subdural hematoma   Person spoke with today (if not patient) and relationship Daughter--Theresa Will Chase reviewed with patient/caregiver? Yes   Is the patient having any side effects they believe may be caused by any medication additions or changes? No   Does the patient have all medications ordered at discharge? N/A  [no new rxs at time of discharge.]   Is the patient taking all medications as directed (includes completed medication regime)? Yes   Comments regarding appointments Neurosurgery hospital f/u appt on 10/29/24 at 11:45 AM with Dr. Mayco Alexandre.   Does the patient have a primary care provider?  Yes   Comments regarding PCP PCP--ELSA Brewer. ---appt tomorrow, 10/25/24 at 8:15 AM for hospital f/u.   Has the patient kept scheduled appointments due by today? N/A   Has home health visited the patient within 72 hours of discharge? N/A   Psychosocial issues? No   Does the patient require any assistance with activities of daily living such as eating, bathing, dressing, walking, etc.? No   Does the patient have any residual symptoms from stroke/TIA? No   Does the patient understand the diet ordered at discharge? Yes   Comments Daughter states her left arm is still sore and having pain. She states there was no fracture in arm. She plans to discuss with PCP tomorrow at hospital f/u appt.   Did the patient receive a copy of their discharge instructions? Yes   Nursing interventions Reviewed instructions with patient   What is the patient's perception of their health status since discharge? Improving   Nursing interventions Nurse provided patient education   Is the patient able to teach back FAST  for Stroke? B alance: Watch for sudden loss of balance, E yes: Check for vision loss, F ace: Look for an uneven smile, A rm: Check if one arm is weak, S peech: Listen for slurred speech, T ronald: Call 9-1-1 right away   Is the patient/caregiver able to teach back the risk factors for a stroke? High blood pressure-goal below 120/80   Is the patient/caregiver able to teach back signs and symptoms related to disease process for when to call PCP? Yes   Is the patient/caregiver able to teach back signs and symptoms related to disease process for when to call 911? Yes   If the patient is a current smoker, are they able to teach back resources for cessation? Not a smoker   Is the patient/caregiver able to teach back the hierarchy of who to call/visit for symptoms/problems? PCP, Specialist, Home health nurse, Urgent Care, ED, 911 Yes   Week 2 call completed? Yes   Is the patient interested in additional calls from an ambulatory ? No   Would this patient benefit from a Referral to Amb Social Work? No   Wrap up additional comments Daughter denies any needs or concerns at this time.   Call end time 1140            Shanice CARDENAS - Registered Nurse

## 2024-10-25 ENCOUNTER — OFFICE VISIT (OUTPATIENT)
Dept: INTERNAL MEDICINE | Facility: CLINIC | Age: 89
End: 2024-10-25
Payer: MEDICARE

## 2024-10-25 VITALS
HEART RATE: 76 BPM | DIASTOLIC BLOOD PRESSURE: 62 MMHG | HEIGHT: 60 IN | TEMPERATURE: 98 F | SYSTOLIC BLOOD PRESSURE: 110 MMHG | OXYGEN SATURATION: 97 % | WEIGHT: 146.6 LBS | BODY MASS INDEX: 28.78 KG/M2

## 2024-10-25 DIAGNOSIS — S06.5XAA SUBDURAL HEMATOMA: ICD-10-CM
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
## 2024-10-25 DIAGNOSIS — M25.532 LEFT WRIST PAIN: ICD-10-CM

## 2024-10-25 DIAGNOSIS — F51.01 PRIMARY INSOMNIA: ICD-10-CM

## 2024-10-25 DIAGNOSIS — Z09 HOSPITAL DISCHARGE FOLLOW-UP: Primary | ICD-10-CM

## 2024-10-25 PROCEDURE — 1111F DSCHRG MED/CURRENT MED MERGE: CPT | Performed by: NURSE PRACTITIONER

## 2024-10-25 PROCEDURE — 99495 TRANSJ CARE MGMT MOD F2F 14D: CPT | Performed by: NURSE PRACTITIONER

## 2024-10-25 PROCEDURE — 1160F RVW MEDS BY RX/DR IN RCRD: CPT | Performed by: NURSE PRACTITIONER

## 2024-10-25 PROCEDURE — 1125F AMNT PAIN NOTED PAIN PRSNT: CPT | Performed by: NURSE PRACTITIONER

## 2024-10-25 PROCEDURE — 1159F MED LIST DOCD IN RCRD: CPT | Performed by: NURSE PRACTITIONER

## 2024-10-25 RX ORDER — ASPIRIN 81 MG/1
81 TABLET ORAL DAILY
COMMUNITY

## 2024-10-25 NOTE — PROGRESS NOTES
Subjective   Norma Trejo is a 89 y.o. female.     Chief Complaint   Patient presents with    Fall     Pt is here [facility] hospital follow up from fall. Pt c/o left arm pain below elbow.        History of Present Illness   She is here today accompanied by her daughter for hospital follow-up.  TCM note reviewed by me today.  She presented to [facility] ER on 10/13 and was admitted.  She sustained a fall at home.  CT head obtained showing evidence of a subdural hematoma.  Neurosurgery was consulted and evaluated.  They recommended repeat imaging which showed relatively unchanged findings.  Per neurosurgery recommendations Eliquis and aspirin were held at discharge until neurosurgery follow-up.  She also complained of left arm pain.  X-ray imaging was negative.  During hospitalization she noted left arm pain.  Imaging was negative for fracture or dislocation. During hospitalization physical therapy evaluated the patient.  They recommended assistance with changing positions, ambulation with close monitoring and fall risk precautions at home.  She was discharged on 10/15 to follow-up with PCP and neurosurgery outpatient.  She presents today for follow up. She restarted the eliquis and aspirin 2 days ago. She is tolerating this well. She had repeat CT head on 10/21 showing resolution of subdural hematoma.  She is feeling well overall since discharge.  She denies any dizziness, confusion, unilateral weakness, difficulty with speech, syncope, chest pain, palpitations, headache or change in vision.  She does still note left wrist and forearm pain.  Pain is worse with flexion and extension of the wrist as well as with grabbing or carrying something in the left hand.  She denies any paresthesias or weakness.  She notes mild swelling proximal to the wrist.  She has been using topical diclofenac with mild improvement in pain.  She also still notes sleep disturbance with difficulty falling asleep and staying asleep.  She is  currently prescribed amitriptyline 25 mg nightly by neurology.  She has tried melatonin without improvement.  She is scheduled to see neurology, Dr. Garcia on 10/31 and follow-up with neurosurgery on 10/29.  Her daughter notes that when she sustained the fall she was at her other daughter's house and her cane got stuck on the carpet.  She has some difficulty with ambulation using the cane and requires assistance.  She would benefit from a rollator to use at home to prevent risk of falling.  She denies any additional falls outside of most recent incident.    The following portions of the patient's history were reviewed and updated as appropriate: allergies, current medications, past family history, past medical history, past social history, past surgical history, and problem list.    Review of Systems   Constitutional: Negative.    Respiratory: Negative.     Cardiovascular: Negative.    Musculoskeletal:  Positive for arthralgias.   Neurological: Negative.    Psychiatric/Behavioral:  Positive for sleep disturbance. Negative for suicidal ideas and depressed mood. The patient is not nervous/anxious.        Objective   Physical Exam  Constitutional:       General: She is awake.      Appearance: She is well-developed.      Comments: Frail-appearing.   Neck:      Thyroid: No thyroid mass, thyromegaly or thyroid tenderness.      Vascular: No carotid bruit.      Trachea: Trachea normal.   Cardiovascular:      Rate and Rhythm: Normal rate and regular rhythm.      Chest Wall: PMI is not displaced.      Pulses:           Radial pulses are 2+ on the right side and 2+ on the left side.        Dorsalis pedis pulses are 2+ on the right side and 2+ on the left side.        Posterior tibial pulses are 2+ on the right side and 2+ on the left side.      Heart sounds: S1 normal and S2 normal.   Pulmonary:      Effort: Pulmonary effort is normal.      Breath sounds: Normal breath sounds.   Musculoskeletal:      Left forearm: Swelling and  tenderness present. No edema, deformity, lacerations or bony tenderness.      Left wrist: Swelling and tenderness present. No deformity, effusion, lacerations, bony tenderness, snuff box tenderness or crepitus. Normal range of motion. Normal pulse.      Left hand: Normal.      Right lower leg: No edema.      Left lower leg: No edema.      Comments:  strength left hand 4 out of 5, right hand 5 out of 5.   Lymphadenopathy:      Head:      Right side of head: No submental, submandibular, tonsillar or occipital adenopathy.      Left side of head: No submental, submandibular, tonsillar or occipital adenopathy.      Cervical: No cervical adenopathy.   Skin:     General: Skin is warm and dry.      Capillary Refill: Capillary refill takes less than 2 seconds.      Nails: There is no clubbing.   Neurological:      Mental Status: She is alert and oriented to person, place, and time.      Cranial Nerves: Cranial nerves 2-12 are intact.      Sensory: Sensation is intact.      Motor: Motor function is intact.      Coordination: Coordination is intact.      Gait: Gait abnormal (needs assistance with position changes and ambulation).   Psychiatric:         Attention and Perception: Attention normal.         Mood and Affect: Mood and affect normal.         Speech: Speech normal.         Behavior: Behavior normal. Behavior is cooperative.         Thought Content: Thought content normal.         Cognition and Memory: Cognition normal.         Vitals:    10/25/24 0805   BP: 110/62   Pulse: 76   Temp: 98 °F (36.7 °C)   SpO2: 97%      Body mass index is 28.63 kg/m².    Assessment & Plan   Problems Addressed this Visit       Primary insomnia    Fall     Other Visit Diagnoses       Hospital discharge follow-up    -  Primary    Subdural hematoma        Left wrist pain              Diagnoses         Codes Comments    Hospital discharge follow-up    -  Primary ICD-10-CM: Z09  ICD-9-CM: V67.59     Subdural hematoma     ICD-10-CM:  S06.5XAA  ICD-9-CM: 432.1     Left wrist pain     ICD-10-CM: M25.532  ICD-9-CM: 719.43     Fall, subsequent encounter     ICD-10-CM: W19.XXXD  ICD-9-CM: V58.89, E888.9     Primary insomnia     ICD-10-CM: F51.01  ICD-9-CM: 307.42           1.  Hospital discharge follow-up for subdural hematoma-she is feeling better overall.  Encouraged her to follow-up with neurosurgery and neurology as scheduled.  Discussed with her and her daughter at length risks and benefits of continuing on Eliquis and aspirin.  Patient and daughter elect to continue the therapy.  Discussed the importance of fall risk reduction techniques at home.  Patient would benefit from a rollator to use with ambulation as well as close monitoring at home.  Her daughter and family friend care for the patient throughout the day and overnight.  She has not had any difficulties with frequent falls outside of this isolated incident in which she was outside of her normal environment.  Recommend that she use the rollator and have assistance when ambulating and when traveling outside of the home.  2.  Left wrist pain-likely a strain.  Recommend wearing a brace and using topical diclofenac gel along with ice.  If symptoms persist recommend referral to hand surgery.  3.  Primary insomnia-recommend continuing amitriptyline 25 mg nightly.  Can also try magnesium spray or cream on the feet to see if this assists with sleep.  Encouraged her to follow-up with neurology as scheduled next week to see if they recommend increasing the amitriptyline dose.

## 2024-10-29 ENCOUNTER — OFFICE VISIT (OUTPATIENT)
Dept: NEUROSURGERY | Facility: CLINIC | Age: 89
End: 2024-10-29
Payer: MEDICARE

## 2024-10-29 VITALS
HEIGHT: 60 IN | RESPIRATION RATE: 20 BRPM | WEIGHT: 146 LBS | BODY MASS INDEX: 28.66 KG/M2 | DIASTOLIC BLOOD PRESSURE: 60 MMHG | SYSTOLIC BLOOD PRESSURE: 118 MMHG

## 2024-10-29 DIAGNOSIS — K21.9 GASTROESOPHAGEAL REFLUX DISEASE, UNSPECIFIED WHETHER ESOPHAGITIS PRESENT: ICD-10-CM

## 2024-10-29 DIAGNOSIS — Z79.01 CURRENT USE OF LONG TERM ANTICOAGULATION: ICD-10-CM

## 2024-10-29 DIAGNOSIS — Z91.81 STATUS POST FALL: Primary | ICD-10-CM

## 2024-10-29 PROBLEM — S06.5XAA ACUTE SUBDURAL HEMATOMA: Status: RESOLVED | Noted: 2024-10-13 | Resolved: 2024-10-29

## 2024-10-29 RX ORDER — VONOPRAZAN FUMARATE 13.36 MG/1
10 TABLET ORAL DAILY
Qty: 30 TABLET | Refills: 5 | Status: SHIPPED | OUTPATIENT
Start: 2024-10-29

## 2024-10-29 RX ORDER — VONOPRAZAN FUMARATE 13.36 MG/1
10 TABLET ORAL DAILY
Qty: 30 TABLET | Refills: 0 | Status: SHIPPED | OUTPATIENT
Start: 2024-10-29 | End: 2024-10-29 | Stop reason: SDUPTHER

## 2024-10-31 ENCOUNTER — PROCEDURE VISIT (OUTPATIENT)
Dept: NEUROLOGY | Facility: CLINIC | Age: 89
End: 2024-10-31
Payer: MEDICARE

## 2024-10-31 DIAGNOSIS — M54.81 BILATERAL OCCIPITAL NEURALGIA: Primary | ICD-10-CM

## 2024-10-31 RX ORDER — LIDOCAINE HYDROCHLORIDE 20 MG/ML
1 INJECTION, SOLUTION INFILTRATION; PERINEURAL ONCE
Status: COMPLETED | OUTPATIENT
Start: 2024-10-31 | End: 2024-10-31

## 2024-10-31 RX ORDER — METHYLPREDNISOLONE ACETATE 40 MG/ML
40 INJECTION, SUSPENSION INTRA-ARTICULAR; INTRALESIONAL; INTRAMUSCULAR; SOFT TISSUE ONCE
Status: COMPLETED | OUTPATIENT
Start: 2024-10-31 | End: 2024-10-31

## 2024-10-31 RX ADMIN — METHYLPREDNISOLONE ACETATE 40 MG: 40 INJECTION, SUSPENSION INTRA-ARTICULAR; INTRALESIONAL; INTRAMUSCULAR; SOFT TISSUE at 13:48

## 2024-10-31 RX ADMIN — LIDOCAINE HYDROCHLORIDE 1 ML: 20 INJECTION, SOLUTION INFILTRATION; PERINEURAL at 13:47

## 2024-10-31 NOTE — ADDENDUM NOTE
Addended by: STEVE URRUTIA on: 10/31/2024 01:48 PM     Modules accepted: Orders     Pt has been withdrawn this shift. Pt keeps to self. Sits in the lounge at time and is not social with peers. Uses a walker with no assistance required. Steady slow gait. Medication compliant. Pt requested to leave. Pt wife also called and request that he discharge. Pt appears very flat and depressed. Pt now denies SI and SIB.  Pt ate breakfast and lunch. Pt is slow to respond. Pt requested more picture books and pt was given several magazines and pt was happy with that.

## 2024-10-31 NOTE — PROGRESS NOTES
"Procedure   Nerve Block    Date/Time: 10/31/2024 9:40 AM    Performed by: Jeremiah Garcia II, MD  Authorized by: Jeremiah Garcia II, MD  Consent: Verbal consent obtained. Written consent obtained.  Risks and benefits: risks, benefits and alternatives were discussed  Consent given by: patient  Patient understanding: patient states understanding of the procedure being performed  Patient consent: the patient's understanding of the procedure matches consent given  Procedure consent: procedure consent matches procedure scheduled  Required items: required blood products, implants, devices, and special equipment available  Patient identity confirmed: verbally with patient and provided demographic data  Time out: Immediately prior to procedure a \"time out\" was called to verify the correct patient, procedure, equipment, support staff and site/side marked as required.  Indications comments: occipital neuralgia  Body area: head  Nerve: greater occipital  Laterality: bilatera.    Sedation:  Patient sedated: no    Patient position: sitting  Needle size: 27 G  Location technique: anatomical landmarks  Patient tolerance: Patient tolerated the procedure well with no immediate complications  Comments: 1 cc of both Depo-Medrol and 2% lidocaine without epinephrine were drawn into 2, 3 cc syringes.  1 cc of this mixture was injected at the site of the greater occipital nerve bilaterally.           ONB Bilateral  "

## 2024-11-03 DIAGNOSIS — I10 BENIGN ESSENTIAL HYPERTENSION: ICD-10-CM

## 2024-11-04 RX ORDER — METOPROLOL TARTRATE 25 MG/1
25 TABLET, FILM COATED ORAL 2 TIMES DAILY
Qty: 180 TABLET | Refills: 1 | OUTPATIENT
Start: 2024-11-04

## 2024-11-14 DIAGNOSIS — R51.9 CHRONIC DAILY HEADACHE: ICD-10-CM

## 2024-11-14 DIAGNOSIS — M54.81 BILATERAL OCCIPITAL NEURALGIA: Primary | ICD-10-CM

## 2025-01-02 ENCOUNTER — TELEPHONE (OUTPATIENT)
Dept: INTERNAL MEDICINE | Facility: CLINIC | Age: OVER 89
End: 2025-01-02
Payer: MEDICARE

## 2025-01-02 NOTE — TELEPHONE ENCOUNTER
Delete after reviewing: Send to the appropriate pool. Check your call action grid or workflows.    Caller: MIMI LANDAVERDE     Relationship: DAUGHTER    Best call back number:     [phone] (Home)       What is your medical concern? CONSTIPATION     How long has this issue been going on? TWO WEEKS, THREE DAYS IN A ROW NOW THAT SHE'S NOT BEEN ABLE TO HAVE A BOWEL MOVEMENT     Is your provider already aware of this issue? NO     Have you been treated for this issue? YES OVER THE COUNTER.

## 2025-01-02 NOTE — TELEPHONE ENCOUNTER
Daughter states pt is on day 3 without any bowl movement, taking Miralax and fluids and fiber intake but not working, she is not having any abdominal pain, she is passing gas. Please advise.

## 2025-01-07 ENCOUNTER — TELEPHONE (OUTPATIENT)
Dept: GASTROENTEROLOGY | Facility: CLINIC | Age: OVER 89
End: 2025-01-07
Payer: MEDICARE

## 2025-01-07 NOTE — TELEPHONE ENCOUNTER
I spoke with patient's daughter, appt R/S to 03/06/2025 at 2:30 pm with Fouzia.     Requested to be added to cancel List. They live 5 min away fro our office, any same-day-opening (earlier date) will be most likely accepted.

## 2025-01-15 ENCOUNTER — OFFICE VISIT (OUTPATIENT)
Dept: INTERNAL MEDICINE | Facility: CLINIC | Age: OVER 89
End: 2025-01-15
Payer: MEDICARE

## 2025-01-15 VITALS
TEMPERATURE: 98.4 F | WEIGHT: 150 LBS | HEART RATE: 87 BPM | HEIGHT: 60 IN | DIASTOLIC BLOOD PRESSURE: 66 MMHG | OXYGEN SATURATION: 96 % | BODY MASS INDEX: 29.45 KG/M2 | SYSTOLIC BLOOD PRESSURE: 130 MMHG

## 2025-01-15 DIAGNOSIS — R06.02 SOB (SHORTNESS OF BREATH): ICD-10-CM

## 2025-01-15 DIAGNOSIS — I10 BENIGN ESSENTIAL HYPERTENSION: ICD-10-CM

## 2025-01-15 DIAGNOSIS — H00.015 HORDEOLUM EXTERNUM OF LEFT LOWER EYELID: ICD-10-CM

## 2025-01-15 DIAGNOSIS — M54.42 ACUTE LEFT-SIDED LOW BACK PAIN WITH LEFT-SIDED SCIATICA: Primary | ICD-10-CM

## 2025-01-15 DIAGNOSIS — R60.0 BILATERAL LEG EDEMA: ICD-10-CM

## 2025-01-15 PROCEDURE — 1126F AMNT PAIN NOTED NONE PRSNT: CPT | Performed by: NURSE PRACTITIONER

## 2025-01-15 PROCEDURE — 1159F MED LIST DOCD IN RCRD: CPT | Performed by: NURSE PRACTITIONER

## 2025-01-15 PROCEDURE — 99214 OFFICE O/P EST MOD 30 MIN: CPT | Performed by: NURSE PRACTITIONER

## 2025-01-15 PROCEDURE — 1160F RVW MEDS BY RX/DR IN RCRD: CPT | Performed by: NURSE PRACTITIONER

## 2025-01-15 RX ORDER — HYDROCHLOROTHIAZIDE 25 MG/1
25 TABLET ORAL DAILY
Qty: 90 TABLET | Refills: 1 | Status: SHIPPED | OUTPATIENT
Start: 2025-01-15

## 2025-01-15 RX ORDER — METHYLPREDNISOLONE 4 MG/1
TABLET ORAL
Qty: 21 EACH | Refills: 0 | Status: SHIPPED | OUTPATIENT
Start: 2025-01-15

## 2025-01-15 RX ORDER — ERYTHROMYCIN 5 MG/G
OINTMENT OPHTHALMIC NIGHTLY
Qty: 1 EACH | Refills: 0 | Status: SHIPPED | OUTPATIENT
Start: 2025-01-15 | End: 2025-01-22

## 2025-01-15 RX ORDER — AMLODIPINE BESYLATE 2.5 MG/1
2.5 TABLET ORAL NIGHTLY
Start: 2025-01-15

## 2025-01-15 NOTE — PATIENT INSTRUCTIONS
Blood pressure- increase hctz to 25 mg daily, decrease amlodipine to 2.5 mg nightly. Check labs today for swelling.    Low back pain- start medrol dose pack, heat or ice, Icy Hot. If symptoms persist recommend PT and imaging.    Constipation- decrease amlodipine. Increase fluids and fiber. Try milk of magnesia or magnesium citrate if constipation is severe.

## 2025-01-15 NOTE — PROGRESS NOTES
Subjective   Norma Trejo is a 89 y.o. female.     Chief Complaint   Patient presents with    Leg Pain     Pt c/o left leg pain start at buttock that radiate down to leg when she get up from sitting position X Saturday.     Edema     Pt c/o bilateral extremities swelling X Friday.      Stye     Pt c/o stye on left eye X yesterday.        History of Present Illness   She is here today with complaints of left side low back pain.  She is accompanied by her daughter today to translate.  Symptoms started on Saturday.  She denies any known injury.  Pain radiates down the left buttocks and down the posterior left leg.  Pain is worse with position changes and when going from sitting to standing.  She denies any acute trauma, saddle anesthesia, bowel or bladder dysfunction, paresthesias or lower extremity weakness.  She has been using IcyHot with mild improvement.    She also notes bilateral lower extremity swelling since Friday. She has been more sedentary recently. She notes mild SOB and wheezing with activity. She denies any orthopnea, PND, chest pain, palpitations, syncope.  She is currently on amlodipine 2.5 mg twice daily.  She has been experiencing some constipation.  She has tried MiraLAX with minimal improvement.    She also notes left eye pain and swelling along the left lower eyelid starting yesterday.  She is concerned for possible stye.  She denies any new make-up or skin care products.    The following portions of the patient's history were reviewed and updated as appropriate: allergies, current medications, past family history, past medical history, past social history, past surgical history, and problem list.    Review of Systems   Constitutional: Negative.    Eyes:  Positive for pain and redness. Negative for blurred vision, double vision, photophobia, discharge, itching and visual disturbance.   Respiratory:  Positive for shortness of breath and wheezing. Negative for cough and chest tightness.     Cardiovascular:  Positive for leg swelling. Negative for chest pain and palpitations.   Gastrointestinal:  Positive for constipation.   Musculoskeletal:  Positive for back pain.   Neurological: Negative.    Psychiatric/Behavioral: Negative.         Objective   Physical Exam  Constitutional:       Appearance: She is well-developed.   Eyes:      General: Gaze aligned appropriately.         Left eye: Hordeolum present.     Extraocular Movements: Extraocular movements intact.      Conjunctiva/sclera: Conjunctivae normal.     Neck:      Thyroid: No thyroid mass, thyromegaly or thyroid tenderness.      Vascular: No carotid bruit.      Trachea: Trachea normal.   Cardiovascular:      Rate and Rhythm: Normal rate and regular rhythm.      Chest Wall: PMI is not displaced.      Pulses:           Radial pulses are 2+ on the right side and 2+ on the left side.        Dorsalis pedis pulses are 2+ on the right side and 2+ on the left side.        Posterior tibial pulses are 2+ on the right side and 2+ on the left side.      Heart sounds: S1 normal and S2 normal.      Comments: Trace bilateral ankle edema.  Pulmonary:      Effort: Pulmonary effort is normal.      Breath sounds: Normal breath sounds.   Musculoskeletal:      Cervical back: Normal.      Thoracic back: Normal.      Lumbar back: Tenderness present. No swelling, edema, deformity, signs of trauma, lacerations, spasms or bony tenderness. Decreased range of motion. Negative right straight leg raise test and negative left straight leg raise test. No scoliosis.        Back:       Right lower leg: Edema present.      Left lower leg: Edema present.      Comments: Lower extremity strength 4 out of 5 symmetric and equal bilaterally.   Lymphadenopathy:      Head:      Right side of head: No submental, submandibular, tonsillar or occipital adenopathy.      Left side of head: No submental, submandibular, tonsillar or occipital adenopathy.      Cervical: No cervical adenopathy.    Skin:     General: Skin is warm and dry.      Capillary Refill: Capillary refill takes less than 2 seconds.      Nails: There is no clubbing.   Neurological:      Mental Status: She is alert and oriented to person, place, and time.   Psychiatric:         Attention and Perception: Attention normal.         Mood and Affect: Mood and affect normal.         Speech: Speech normal.         Behavior: Behavior normal.         Thought Content: Thought content normal.         Cognition and Memory: Cognition normal.         Vitals:    01/15/25 0842   BP: 130/66   Pulse: 87   Temp: 98.4 °F (36.9 °C)   SpO2: 96%      Body mass index is 29.29 kg/m².    Assessment & Plan   Problems Addressed this Visit       Benign essential hypertension    Relevant Medications    hydroCHLOROthiazide 25 MG tablet    amLODIPine (NORVASC) 2.5 MG tablet     Other Visit Diagnoses       Acute left-sided low back pain with left-sided sciatica    -  Primary    Relevant Medications    methylPREDNISolone (MEDROL) 4 MG dose pack    Bilateral leg edema        Relevant Medications    hydroCHLOROthiazide 25 MG tablet    amLODIPine (NORVASC) 2.5 MG tablet    Other Relevant Orders    Basic Metabolic Panel    proBNP    Hordeolum externum of left lower eyelid        Relevant Medications    erythromycin (ROMYCIN) 5 MG/GM ophthalmic ointment    SOB (shortness of breath)        Relevant Orders    Basic Metabolic Panel    proBNP          Diagnoses         Codes Comments    Acute left-sided low back pain with left-sided sciatica    -  Primary ICD-10-CM: M54.42  ICD-9-CM: 724.2, 724.3     Bilateral leg edema     ICD-10-CM: R60.0  ICD-9-CM: 782.3     Hordeolum externum of left lower eyelid     ICD-10-CM: H00.015  ICD-9-CM: 373.11     SOB (shortness of breath)     ICD-10-CM: R06.02  ICD-9-CM: 786.05     Benign essential hypertension     ICD-10-CM: I10  ICD-9-CM: 401.1           1.  Acute left-sided low back pain with left-sided sciatica-start Medrol Dosepak.  Okay to  continue IcyHot.  Also recommend ice and heat with gentle stretching.  If symptoms persist recommend physical therapy.  2.  Bilateral leg edema/shortness of breath-check labs today including BMP and proBNP.  Will decrease amlodipine to 2.5 mg nightly and increase hydrochlorothiazide to 25 mg daily.  Encouraged her to stay active as tolerated.  Recommend elevating the legs when sitting and wearing compression socks.  3.  HTN-we will decrease amlodipine to 2.5 mg nightly and increase hydrochlorothiazide to 25 mg daily.  Her daughter will update me with blood pressure readings in 4 weeks.  Pending today's lab work may need to repeat BMP to ensure kidney function and electrolytes are stable on the higher dose of hydrochlorothiazide.  4.  Hordeolum externum of left lower eyelid-start erythromycin ophthalmic ointment nightly in the left eye for 7 days.  Avoid eye make-up.  Recommend warm compresses.

## 2025-01-16 DIAGNOSIS — E83.52 HYPERCALCEMIA: ICD-10-CM

## 2025-01-16 DIAGNOSIS — N18.31 STAGE 3A CHRONIC KIDNEY DISEASE: Primary | ICD-10-CM

## 2025-01-16 LAB
BUN SERPL-MCNC: 13 MG/DL (ref 8–23)
BUN/CREAT SERPL: 11.4 (ref 7–25)
CALCIUM SERPL-MCNC: 9.8 MG/DL (ref 8.2–9.6)
CHLORIDE SERPL-SCNC: 103 MMOL/L (ref 98–107)
CO2 SERPL-SCNC: 27.9 MMOL/L (ref 22–29)
CREAT SERPL-MCNC: 1.14 MG/DL (ref 0.57–1)
EGFRCR SERPLBLD CKD-EPI 2021: 44.7 ML/MIN/1.73
GLUCOSE SERPL-MCNC: 116 MG/DL (ref 65–99)
NT-PROBNP SERPL-MCNC: 183 PG/ML (ref 0–738)
POTASSIUM SERPL-SCNC: 4.7 MMOL/L (ref 3.5–5.2)
SODIUM SERPL-SCNC: 142 MMOL/L (ref 136–145)

## 2025-01-28 DIAGNOSIS — R51.9 CHRONIC DAILY HEADACHE: ICD-10-CM

## 2025-01-28 DIAGNOSIS — M54.81 BILATERAL OCCIPITAL NEURALGIA: ICD-10-CM

## 2025-02-10 DIAGNOSIS — I10 BENIGN ESSENTIAL HYPERTENSION: ICD-10-CM

## 2025-02-10 RX ORDER — METOPROLOL TARTRATE 50 MG
50 TABLET ORAL 2 TIMES DAILY
Qty: 180 TABLET | Refills: 0 | Status: SHIPPED | OUTPATIENT
Start: 2025-02-10

## 2025-02-12 ENCOUNTER — OFFICE VISIT (OUTPATIENT)
Dept: NEUROLOGY | Facility: CLINIC | Age: OVER 89
End: 2025-02-12
Payer: MEDICARE

## 2025-02-12 VITALS
DIASTOLIC BLOOD PRESSURE: 58 MMHG | OXYGEN SATURATION: 97 % | BODY MASS INDEX: 29.29 KG/M2 | HEART RATE: 81 BPM | SYSTOLIC BLOOD PRESSURE: 110 MMHG | HEIGHT: 60 IN

## 2025-02-12 DIAGNOSIS — M54.81 BILATERAL OCCIPITAL NEURALGIA: Primary | ICD-10-CM

## 2025-02-12 PROCEDURE — 99213 OFFICE O/P EST LOW 20 MIN: CPT | Performed by: PSYCHIATRY & NEUROLOGY

## 2025-02-12 PROCEDURE — 1160F RVW MEDS BY RX/DR IN RCRD: CPT | Performed by: PSYCHIATRY & NEUROLOGY

## 2025-02-12 PROCEDURE — 1159F MED LIST DOCD IN RCRD: CPT | Performed by: PSYCHIATRY & NEUROLOGY

## 2025-02-12 NOTE — PROGRESS NOTES
Chief Complaint   Patient presents with    Migraine without aura and without status migrainosus, not i       Patient ID: Norma Trejo is a 89 y.o. female.    HPI: I have had the pleasure of seeing your patient again today.  As you may know she is a 89-year-old female here for the management of occipital neuralgia and chronic headaches.  She does well with occipital nerve blocks.  She has had 6 to 8 days of headaches within the last 30 days.  She is starting to feel some of the headaches returned in the back of the head.  She may take over-the-counter medication if needed however otherwise she does not take or abortive medicine.    The following portions of the patient's history were reviewed and updated as appropriate: allergies, current medications, past family history, past medical history, past social history, past surgical history and problem list.    Review of Systems   Constitutional:  Positive for fatigue.   HENT:  Positive for trouble swallowing.    Respiratory:  Positive for choking.    Neurological:  Positive for headaches. Negative for dizziness, tremors, seizures, syncope, facial asymmetry, speech difficulty, weakness, light-headedness and numbness.   Psychiatric/Behavioral:  Positive for sleep disturbance. Negative for agitation, behavioral problems, confusion, decreased concentration, dysphoric mood, hallucinations, self-injury and suicidal ideas. The patient is not nervous/anxious and is not hyperactive.       I have reviewed the review of systems above performed by my medical assistant.      Vitals:    02/12/25 1124   BP: 110/58   Pulse: 81   SpO2: 97%       Neurological Exam  Mental Status  Awake, alert and oriented to person, place and time. Speech is normal. Language is fluent with no aphasia. Attention and concentration are normal. Fund of knowledge is appropriate for level of education.    Cranial Nerves  CN I: Sense of smell is normal.  CN II: Visual acuity is normal.  CN III, IV, VI:  Extraocular movements intact bilaterally. Pupils equal round and reactive to light bilaterally.  CN V: Facial sensation is normal.  CN VII: Full and symmetric facial movement.  CN XI: Shoulder shrug strength is normal.  CN XII: Tongue midline without atrophy or fasciculations.    Motor  Normal muscle bulk throughout. No fasciculations present. Normal muscle tone. No abnormal involuntary movements. No pronator drift.                                             Right                     Left  Rhomboids                            5                          5  Infraspinatus                          5                          5  Supraspinatus                       5                          5  Deltoid                                   5                          5   Biceps                                   5                          5  Brachioradialis                      5                          5   Triceps                                  5                          5   Pronator                                5                          5   Supinator                              5                           5   Wrist flexor                            5                          5   Wrist extensor                       5                          5   Finger flexor                          5                          5   Finger extensor                     5                          5   Interossei                              5                          5   Abductor pollicis brevis         5                          5   Flexor pollicis brevis             5                          5   Opponens pollicis                 5                          5  Extensor digitorum               5                          5  Abductor digiti minimi           5                          5   Abdominal                            5                          5  Glutei                                    5                          5  Hip abductor                          5                          5  Hip adductor                         5                          5   Iliopsoas                               5                          5   Quadriceps                           5                          5   Hamstring                             5                          5   Gastrocnemius                     5                           5   Anterior tibialis                      5                          5   Posterior tibialis                    5                          5   Peroneal                               5                          5  Ankle dorsiflexor                   5                          5  Ankle plantar flexor              5                           5  Extensor hallucis longus      5                           5    Sensory  Sensation is intact to light touch, pinprick, vibration and proprioception in all four extremities.    Reflexes  Deep tendon reflexes are 2+ and symmetric in all four extremities.    Right pathological reflexes: Manan's absent.  Left pathological reflexes: Manan's absent.    Coordination    Finger-to-nose, rapid alternating movements and heel-to-shin normal bilaterally without dysmetria.    Gait  Normal casual, toe, heel and tandem gait.       Physical Exam  Vitals reviewed.   Constitutional:       General: She is not in acute distress.     Appearance: She is well-developed.   HENT:      Head: Normocephalic and atraumatic.   Eyes:      Extraocular Movements: Extraocular movements intact.      Pupils: Pupils are equal, round, and reactive to light.   Cardiovascular:      Rate and Rhythm: Normal rate and regular rhythm.      Heart sounds: Normal heart sounds.   Pulmonary:      Effort: Pulmonary effort is normal. No respiratory distress.      Breath sounds: Normal breath sounds.   Abdominal:      General: Bowel sounds are normal. There is no distension.      Palpations: Abdomen is soft.      Tenderness: There is no abdominal  tenderness.   Musculoskeletal:         General: No deformity. Normal range of motion.      Cervical back: Normal range of motion.   Skin:     General: Skin is warm.      Findings: No rash.   Neurological:      Coordination: Coordination is intact.      Deep Tendon Reflexes: Reflexes are normal and symmetric.   Psychiatric:         Speech: Speech normal.         Judgment: Judgment normal.         Procedures    Assessment/Plan: We are going to continue with the occipital nerve blocks.  We will get that scheduled and see her back for that soon.  A total of 20 minutes was spent face-to-face with the patient today.  Of that greater than 50% of this time was spent discussing signs and symptoms of occipital neuralgia, bilateral, patient education, plan of care and prognosis.         Diagnoses and all orders for this visit:    1. Bilateral occipital neuralgia (Primary)           Jeremiah Garcia II, MD

## 2025-02-19 DIAGNOSIS — I10 BENIGN ESSENTIAL HYPERTENSION: ICD-10-CM

## 2025-02-19 DIAGNOSIS — R60.0 BILATERAL LEG EDEMA: ICD-10-CM

## 2025-02-19 RX ORDER — AMLODIPINE BESYLATE 2.5 MG/1
2.5 TABLET ORAL NIGHTLY
Qty: 90 TABLET | Refills: 1 | Status: SHIPPED | OUTPATIENT
Start: 2025-02-19

## 2025-03-04 NOTE — PROGRESS NOTES
Chief Complaint   Patient presents with    Constipation        Patient is a 89 y.o. female, new to me, an established patient of our practice.  Patient was last seen by ELSA Edward on 9/12/2023   Office Visit with Angelina Hebert APRN (09/12/2023)   (Copied text in this note has been reviewed and accurate as of 3/4/2025)     Patient has a significant past medical history of hypertension, CVA, A-fib-on anticoagulation, Eliquis.    Patient denies known family history of colon polyps, colon cancer, or IBD.    Previous visit, conservative plan was discussed (continue PPI, recommended esophagram for further evaluation of dysphagia, GERD), due to patient's advanced age and comorbidities, endoscopy evaluation would be avoided unless medically necessary.    History of Present Illness  The patient is a 89-year-old female who presents for evaluation of acid reflux and constipation. She is new to me, but she is an established patient of our practice. Last time she saw Richelle Edward on 09/12/2023. She is accompanied by her daughter.    She has some atrial fibrillation and is on Eliquis twice daily. She has a history of CVA. She is wheelchair bound but able to ambulate independently, although with some balance issues. She has hypertension. She also had a history of colon polyp, but she never had one. There is no family history of colon polyps, colon cancer, or IBD.    - She has been experiencing acid reflux symptoms, including heartburn, dry cough, and dysphagia, particularly with food and liquids. She does not report any episodes of vomiting. She has been self-medicating with over-the-counter omeprazole 40 mg daily for the past 3 days, which has provided some relief. She has previously used pantoprazole and Nexium intermittently while residing in [place]. She does not report any difficulty swallowing pills but notes occasional dysphagia when eating and talking simultaneously.    - She has been managing her  "constipation with over-the-counter milk of magnesia, which has been effective. She has also tried MiraLAX powder mixed with water daily in the morning, but this has not resulted in any bowel movements. Her last bowel movement was this morning, and she typically has a bowel movement every 3 days, characterized by hard stools. She does not report any abdominal pain or hematochezia. She maintains a high-fiber diet, consumes natural juice in the morning, and engages in daily walks. Denies rectal bleeding.    FAMILY HISTORY  There is no family history of colon polyps, colon cancer, or IBD.    MEDICATIONS  Eliquis, omeprazole, milk of magnesia, MiraLAX.    Result Review :    10/14/2024  CMP (normal LFTs, ALK)  CBC (hemoglobin 12.7)    Vital Signs:   /64   Pulse 73   Temp 97.1 °F (36.2 °C)   Ht 152.4 cm (60\")   Wt 70.1 kg (154 lb 9.6 oz)   SpO2 97%   BMI 30.19 kg/m²     Body mass index is 30.19 kg/m².     Physical Exam  Vitals reviewed.   Constitutional:       General: She is not in acute distress.     Appearance: Normal appearance. She is not ill-appearing.   Eyes:      General: No scleral icterus.  Pulmonary:      Effort: Pulmonary effort is normal. No respiratory distress.   Abdominal:      General: Abdomen is flat. Bowel sounds are normal. There is no distension.      Palpations: Abdomen is soft. Abdomen is not rigid. There is no mass or pulsatile mass.      Tenderness: There is no abdominal tenderness. There is no guarding or rebound.      Hernia: No hernia is present.   Skin:     Coloration: Skin is not jaundiced.   Neurological:      Mental Status: She is alert and oriented to person, place, and time.   Psychiatric:         Thought Content: Thought content normal.         Judgment: Judgment normal.       Assessment and Plan    Diagnoses and all orders for this visit:    1. Constipation, unspecified constipation type (Primary)    2. Gastroesophageal reflux disease, unspecified whether esophagitis " present    Other orders  -     pantoprazole (PROTONIX) 40 MG EC tablet; Take 1 tablet by mouth Daily.  Dispense: 30 tablet; Refill: 5  -     docusate sodium (Colace) 100 MG capsule; Take 2 capsules by mouth Daily for 30 days.  Dispense: 60 capsule; Refill: 3  -     linaclotide (Linzess) 72 MCG capsule capsule; Take 1 capsule by mouth Every Morning Before Breakfast.  Dispense: 30 capsule; Refill: 5      Assessment & Plan  1. Acid reflux.  2. Dysphagia, occasional  She reports heartburn, dry cough, and difficulty swallowing. She has been taking over-the-counter omeprazole 40 mg daily for the past 3 days, which has provided some relief.   She is advised to maintain an upright position during meals, avoid lying down immediately post-meal, and steer clear of greasy and spicy foods. Dietary recommendations include consuming soft-textured foods in small portions and chewing thoroughly.   Pantoprazole will be prescribed as it is a smaller size pill and may be easier for her to swallow. She is instructed to take pantoprazole daily. If symptoms persist, she can increase the dosage to twice daily for couple of weeks to reduce gastric acid, then reduce to once daily.     2. Constipation.  She experiences constipation with hard stools every 3 days. She has been using over-the-counter milk of magnesia, which works but causes diarrhea. She has tried MiraLAX daily without significant improvement.   She is advised to increase water intake.   Colace 200 mg, 2 tablets daily  Linzess 72 mcg daily have been prescribed. She is instructed to hold Linzess if diarrhea occurs. Samples of Linzess have been provided, 6 boxes and she is advised to try it for about a week before getting the prescription from the pharmacy.    Given the patient's advanced age and her comorbidities, is understandable that both her and her daughter prefer to avoid invasive procedures at this time.  They have expressed a desire to manage her intermittent difficulty  swallowing conservatively through medication and diet adjustments.  We have discussed the potential treatments, and both the patient and her daughter have shown a clear understanding of the recommendations and are in agreement with this approach.    Follow-up  The patient will follow up in 3 months.    Patient Instructions   - start Pantoprazole 40mg daily for acid reflux/heartburns  - start colace 2 tablets daily  - start Linzess 72mcg daily for constipation. Hold Linzess if diarrhea.     GERD is a chronic disease that occurs when stomach acid flows back into the tube that connects your mouth and stomach. Warning of worsening symptoms may include: Hoarseness, trouble swallowing, too much throat mucus, a lump in the throat, chronic cough, and heartburn. Some conservative measures or treatment may help, which include:  Diet and lifestyle modification: avoid greasy foods or any foods that will cause heartburn for example chocolate, mint, spicy foods, tomato based products, and citrus. Avoid alcohol, tobacco, and caffeine. Avoid late night heavy meals. Avoid bending forward or stooping after eating. Sleep with head of your bed raised 6-8 inches.  You may also try Apple Cider Vinegar, 2 teaspoons in 8 ounces of water 3 times a day. Take 30 minutes before meals or after meals and rinse mouth after each use.   If difficulty swallowing occur, I recommend other supportive care measures, including thicken liquids to avoid aspiration. Eat smaller meals at different time intervals and cut into smaller pieces, take sips of water in between. If the above symptoms do not improve, please contact our office for further evaluation or as plan has discussed.   Continue PPI therapy at this time as it has been effective for GERD symptoms control. Be aware that potential risks associate with long-term (>6 months) PPI therapy may include but not limited to vitamin deficiencies or alteration in vitamin metabolism, such as iron, b12, d,  calcium, and magnesium, as well as risks for gastric polyps and C. Difficile. Take additional vitamin supplements as we've discussed today.       EMR Dragon/Transcription Disclaimer:  This document has been Dictated utilizing Dragon dictation.

## 2025-03-06 ENCOUNTER — OFFICE VISIT (OUTPATIENT)
Dept: GASTROENTEROLOGY | Facility: CLINIC | Age: OVER 89
End: 2025-03-06
Payer: MEDICARE

## 2025-03-06 VITALS
WEIGHT: 154.6 LBS | HEART RATE: 73 BPM | SYSTOLIC BLOOD PRESSURE: 128 MMHG | OXYGEN SATURATION: 97 % | DIASTOLIC BLOOD PRESSURE: 64 MMHG | HEIGHT: 60 IN | BODY MASS INDEX: 30.35 KG/M2 | TEMPERATURE: 97.1 F

## 2025-03-06 DIAGNOSIS — K59.00 CONSTIPATION, UNSPECIFIED CONSTIPATION TYPE: Primary | ICD-10-CM

## 2025-03-06 DIAGNOSIS — K21.9 GASTROESOPHAGEAL REFLUX DISEASE, UNSPECIFIED WHETHER ESOPHAGITIS PRESENT: ICD-10-CM

## 2025-03-06 PROCEDURE — 99214 OFFICE O/P EST MOD 30 MIN: CPT | Performed by: NURSE PRACTITIONER

## 2025-03-06 PROCEDURE — 1160F RVW MEDS BY RX/DR IN RCRD: CPT | Performed by: NURSE PRACTITIONER

## 2025-03-06 PROCEDURE — 1159F MED LIST DOCD IN RCRD: CPT | Performed by: NURSE PRACTITIONER

## 2025-03-06 RX ORDER — DOCUSATE SODIUM 100 MG/1
200 CAPSULE, LIQUID FILLED ORAL DAILY
Qty: 60 CAPSULE | Refills: 3 | Status: SHIPPED | OUTPATIENT
Start: 2025-03-06 | End: 2025-04-05

## 2025-03-06 RX ORDER — PANTOPRAZOLE SODIUM 40 MG/1
40 TABLET, DELAYED RELEASE ORAL DAILY
Qty: 30 TABLET | Refills: 5 | Status: SHIPPED | OUTPATIENT
Start: 2025-03-06

## 2025-03-06 NOTE — PATIENT INSTRUCTIONS
- start Pantoprazole 40mg daily for acid reflux/heartburns  - start colace 2 tablets daily  - start Linzess 72mcg daily for constipation. Hold Linzess if diarrhea.     GERD is a chronic disease that occurs when stomach acid flows back into the tube that connects your mouth and stomach. Warning of worsening symptoms may include: Hoarseness, trouble swallowing, too much throat mucus, a lump in the throat, chronic cough, and heartburn. Some conservative measures or treatment may help, which include:  Diet and lifestyle modification: avoid greasy foods or any foods that will cause heartburn for example chocolate, mint, spicy foods, tomato based products, and citrus. Avoid alcohol, tobacco, and caffeine. Avoid late night heavy meals. Avoid bending forward or stooping after eating. Sleep with head of your bed raised 6-8 inches.  You may also try Apple Cider Vinegar, 2 teaspoons in 8 ounces of water 3 times a day. Take 30 minutes before meals or after meals and rinse mouth after each use.   If difficulty swallowing occur, I recommend other supportive care measures, including thicken liquids to avoid aspiration. Eat smaller meals at different time intervals and cut into smaller pieces, take sips of water in between. If the above symptoms do not improve, please contact our office for further evaluation or as plan has discussed.   Continue PPI therapy at this time as it has been effective for GERD symptoms control. Be aware that potential risks associate with long-term (>6 months) PPI therapy may include but not limited to vitamin deficiencies or alteration in vitamin metabolism, such as iron, b12, d, calcium, and magnesium, as well as risks for gastric polyps and C. Difficile. Take additional vitamin supplements as we've discussed today.

## 2025-03-18 RX ORDER — ATORVASTATIN CALCIUM 20 MG/1
20 TABLET, FILM COATED ORAL DAILY
Qty: 90 TABLET | Refills: 1 | Status: SHIPPED | OUTPATIENT
Start: 2025-03-18

## 2025-03-20 ENCOUNTER — PROCEDURE VISIT (OUTPATIENT)
Dept: NEUROLOGY | Facility: CLINIC | Age: OVER 89
End: 2025-03-20
Payer: MEDICARE

## 2025-03-20 DIAGNOSIS — M54.81 BILATERAL OCCIPITAL NEURALGIA: Primary | ICD-10-CM

## 2025-03-20 RX ORDER — METHYLPREDNISOLONE ACETATE 40 MG/ML
40 INJECTION, SUSPENSION INTRA-ARTICULAR; INTRALESIONAL; INTRAMUSCULAR; SOFT TISSUE ONCE
Status: COMPLETED | OUTPATIENT
Start: 2025-03-20 | End: 2025-03-20

## 2025-03-20 RX ORDER — LIDOCAINE HYDROCHLORIDE 20 MG/ML
1 INJECTION, SOLUTION INFILTRATION; PERINEURAL ONCE
Status: COMPLETED | OUTPATIENT
Start: 2025-03-20 | End: 2025-03-20

## 2025-03-20 RX ADMIN — METHYLPREDNISOLONE ACETATE 40 MG: 40 INJECTION, SUSPENSION INTRA-ARTICULAR; INTRALESIONAL; INTRAMUSCULAR; SOFT TISSUE at 10:59

## 2025-03-20 RX ADMIN — LIDOCAINE HYDROCHLORIDE 1 ML: 20 INJECTION, SOLUTION INFILTRATION; PERINEURAL at 10:59

## 2025-03-20 NOTE — PROGRESS NOTES
"Procedure   Nerve Block    Date/Time: 3/20/2025 11:08 AM    Performed by: Jeremiah Garcia II, MD  Authorized by: Jeremiah Garcia II, MD  Consent: Verbal consent obtained. Written consent obtained  Risks and benefits: risks, benefits and alternatives were discussed  Consent given by: patient  Patient understanding: patient states understanding of the procedure being performed  Patient consent: the patient's understanding of the procedure matches consent given  Procedure consent: procedure consent matches procedure scheduled  Required items: required blood products, implants, devices, and special equipment available  Patient identity confirmed: verbally with patient and provided demographic data  Time out: Immediately prior to procedure a \"time out\" was called to verify the correct patient, procedure, equipment, support staff and site/side marked as required.  Indications comments: occipital neuralgia  Body area: head  Nerve: greater occipital  Laterality: bilateral.    Sedation:  Patient sedated: no    Patient position: sitting  Needle size: 25 G  Location technique: anatomical landmarks  Local Anesthetic: lidocaine 2% without epinephrine  Patient tolerance: Patient tolerated the procedure well with no immediate complications  Comments: 1 mL of both Depo-Medrol and 2% lidocaine without epinephrine were drawn into two 3 mL syringes. 1 mL of this mixture was injected at the site of the greater occipital nerve, bilaterally, in a fan-shaped distribution.       ONB Bilateral  "

## 2025-03-24 DIAGNOSIS — R51.9 CHRONIC DAILY HEADACHE: ICD-10-CM

## 2025-03-24 DIAGNOSIS — M54.81 BILATERAL OCCIPITAL NEURALGIA: ICD-10-CM

## 2025-03-26 DIAGNOSIS — R51.9 CHRONIC DAILY HEADACHE: ICD-10-CM

## 2025-03-26 DIAGNOSIS — M54.81 BILATERAL OCCIPITAL NEURALGIA: ICD-10-CM

## 2025-04-02 RX ORDER — HYDROCHLOROTHIAZIDE 12.5 MG/1
12.5 TABLET ORAL DAILY
Qty: 90 TABLET | Refills: 1 | OUTPATIENT
Start: 2025-04-02

## 2025-04-16 RX ORDER — APIXABAN 2.5 MG/1
2.5 TABLET, FILM COATED ORAL 2 TIMES DAILY
Qty: 180 TABLET | Refills: 1 | OUTPATIENT
Start: 2025-04-16

## 2025-04-18 RX ORDER — APIXABAN 2.5 MG/1
2.5 TABLET, FILM COATED ORAL 2 TIMES DAILY
Qty: 180 TABLET | Refills: 1 | OUTPATIENT
Start: 2025-04-18

## 2025-04-23 ENCOUNTER — TELEPHONE (OUTPATIENT)
Dept: INTERNAL MEDICINE | Facility: CLINIC | Age: OVER 89
End: 2025-04-23

## 2025-04-23 NOTE — TELEPHONE ENCOUNTER
Caller: Radha Monson    Relationship to patient: Child    Best call back number: [phone]     Patient is needing: PATIENT DAUGHTER NEEDS FMLA PAPERWORK FILLED OUT FOR TAKING CARE OF HER MOTHER APRIL 14TH TO APRIL 18TH HAVING FMLA PAPERWORK FAXED TO OFFICE PLEASE CALL AND ADVISE WHEN FAX TO [facility]

## 2025-04-25 ENCOUNTER — OFFICE VISIT (OUTPATIENT)
Dept: INTERNAL MEDICINE | Facility: CLINIC | Age: OVER 89
End: 2025-04-25
Payer: MEDICARE

## 2025-04-25 VITALS
SYSTOLIC BLOOD PRESSURE: 126 MMHG | DIASTOLIC BLOOD PRESSURE: 66 MMHG | BODY MASS INDEX: 28.47 KG/M2 | TEMPERATURE: 98.3 F | HEART RATE: 76 BPM | HEIGHT: 60 IN | WEIGHT: 145 LBS | OXYGEN SATURATION: 96 %

## 2025-04-25 DIAGNOSIS — R60.0 LEG EDEMA: ICD-10-CM

## 2025-04-25 DIAGNOSIS — Z86.73 HISTORY OF CVA (CEREBROVASCULAR ACCIDENT): Primary | ICD-10-CM

## 2025-04-25 DIAGNOSIS — I10 BENIGN ESSENTIAL HYPERTENSION: ICD-10-CM

## 2025-04-25 RX ORDER — POTASSIUM CHLORIDE 750 MG/1
10 TABLET, EXTENDED RELEASE ORAL DAILY
Qty: 90 TABLET | Refills: 2 | Status: SHIPPED | OUTPATIENT
Start: 2025-04-25

## 2025-04-25 RX ORDER — FUROSEMIDE 20 MG/1
20 TABLET ORAL DAILY
Qty: 90 TABLET | Refills: 2 | Status: SHIPPED | OUTPATIENT
Start: 2025-04-25

## 2025-04-25 NOTE — PROGRESS NOTES
Subjective   Norma Trejo is a 89 y.o. female.     Chief Complaint   Patient presents with    Edema        History of Present Illness   She is here today accompanied by her daughter, Radha Monson to discuss short-term FMLA paperwork.  Norma lives with her daughter Theresa who is her primary caregiver.  Norma has a history of CVA and has had a few falls.  She needs assistance with ADLs and instrumental ADLs and cannot be left alone for long periods of time. She requires assistance specifically with bathing, toileting and ambulating. She also needs assistance with transportation, preparing meals. Her daughter Theresa was transitioning to a new job and had to go to training. Radha came in to assist with Norma's care between the dates of 4/14 and 4/18.  Radha had to miss work during this time to care for her elderly mother.  She presents today with Norma to complete FMLA paperwork for the dates previously listed.    Norma also notes recently worsening swelling in her lower legs and ankles.  This is typically worse at the end of the day, improves with elevation.  She is currently on hydrochlorothiazide 25 mg daily.  She is wearing compression socks with minimal improvement.  Elevating the legs does help.  She denies any chest pain, palpitations, shortness of breath, PND or orthopnea.    The following portions of the patient's history were reviewed and updated as appropriate: allergies, current medications, past family history, past medical history, past social history, past surgical history, and problem list.    Review of Systems   Constitutional: Negative.    Respiratory: Negative.     Cardiovascular:  Positive for leg swelling. Negative for chest pain and palpitations.   Neurological:  Negative for dizziness, tremors, seizures, syncope, speech difficulty, weakness, light-headedness, numbness, headache, memory problem and confusion.   Psychiatric/Behavioral: Negative.         Objective   Physical Exam  Constitutional:        Comments: Frail appearing.   Neck:      Thyroid: No thyroid mass, thyromegaly or thyroid tenderness.      Vascular: No carotid bruit.      Trachea: Trachea normal.   Cardiovascular:      Rate and Rhythm: Normal rate and regular rhythm.      Chest Wall: PMI is not displaced.      Pulses:           Radial pulses are 2+ on the right side and 2+ on the left side.        Dorsalis pedis pulses are 2+ on the right side and 2+ on the left side.        Posterior tibial pulses are 2+ on the right side and 2+ on the left side.      Heart sounds: S1 normal and S2 normal.      Comments: Trace bilateral lower extremity edema.  Pulmonary:      Effort: Pulmonary effort is normal.      Breath sounds: Normal breath sounds.   Musculoskeletal:      Right lower leg: Edema present.      Left lower leg: Edema present.   Lymphadenopathy:      Head:      Right side of head: No submental, submandibular, tonsillar or occipital adenopathy.      Left side of head: No submental, submandibular, tonsillar or occipital adenopathy.      Cervical: No cervical adenopathy.   Skin:     General: Skin is warm and dry.      Capillary Refill: Capillary refill takes less than 2 seconds.      Nails: There is no clubbing.   Neurological:      Mental Status: She is alert and oriented to person, place, and time.   Psychiatric:         Attention and Perception: Attention normal.         Mood and Affect: Mood and affect normal.         Speech: Speech normal.         Behavior: Behavior normal.         Thought Content: Thought content normal.         Cognition and Memory: Cognition normal.         Vitals:    04/25/25 1515   BP: 126/66   Pulse: 76   Temp: 98.3 °F (36.8 °C)   SpO2: 96%      Body mass index is 28.32 kg/m².    Assessment & Plan   Problems Addressed this Visit       History of CVA (cerebrovascular accident) - Primary    Benign essential hypertension    Relevant Medications    furosemide (Lasix) 20 MG tablet    potassium chloride 10 MEQ CR tablet      Other Visit Diagnoses         Leg edema        Relevant Medications    furosemide (Lasix) 20 MG tablet    potassium chloride 10 MEQ CR tablet          Diagnoses         Codes Comments      History of CVA (cerebrovascular accident)    -  Primary ICD-10-CM: Z86.73  ICD-9-CM: V12.54       Leg edema     ICD-10-CM: R60.0  ICD-9-CM: 782.3       Benign essential hypertension     ICD-10-CM: I10  ICD-9-CM: 401.1           1.  History of CVA-requiring assistance with ADLs and instrumental ADLs.  Short-term FMLA paperwork provided to patient's daughter today for the dates of 4/14-4/18.  Discussed safety measures at home to prevent falls.  Healthy, balanced eating focusing on protein intake, walking regularly with assistance for exercise.  2.  Leg edema/HTN-will change from hydrochlorothiazide to Lasix 20 mg daily with potassium 10 mill equivalents daily.  Can take a second dose of Lasix as needed for more significant swelling.  Recommend continuing wearing compression socks and elevating the legs.  Continue to work on increasing walking.  Will follow-up in 8 weeks with same-day BMP.

## 2025-04-25 NOTE — PATIENT INSTRUCTIONS
Leg swelling/Blood pressure- stop hydrochlorothiazide. Start lasix 20 mg daily with 10 mEq of potassium. Can take second dose of lasix as needed for swelling. Continue wearing compression socks and elevating legs. Follow up with same day labs to check kidney function and electrolytes in 8 weeks.

## 2025-04-28 RX ORDER — APIXABAN 2.5 MG/1
2.5 TABLET, FILM COATED ORAL 2 TIMES DAILY
Qty: 180 TABLET | Refills: 1 | OUTPATIENT
Start: 2025-04-28

## 2025-04-30 RX ORDER — APIXABAN 2.5 MG/1
2.5 TABLET, FILM COATED ORAL 2 TIMES DAILY
Qty: 180 TABLET | Refills: 1 | OUTPATIENT
Start: 2025-04-30

## 2025-04-30 NOTE — TELEPHONE ENCOUNTER
Caller: MIMI LANDAVERDE    Relationship: Emergency Contact    Best call back number: [phone]     Requested Prescriptions:   Requested Prescriptions     Pending Prescriptions Disp Refills    apixaban (ELIQUIS) 5 MG tablet tablet 60 tablet      Sig: Take 1 tablet by mouth 2 (Two) Times a Day.        Pharmacy where request should be sent: [facility] [id number] [facility] [id number] [facility] AT [facility] [address] [facility] [phone] [facility] [phone] FX     Last office visit with prescribing clinician: 4/25/2025   Last telemedicine visit with prescribing clinician: Visit date not found   Next office visit with prescribing clinician: 7/14/2025     Additional details provided by patient: PATIENTS DAUGHTER STATED THE PATIENT ONLY HAS MEDICATION FOR TONIGHT 04-.    PATIENTS DAUGHTER STATED THEY HAVE BEEN TRYING TO GET THIS MEDICATION REFILLED, HOWEVER HAVE BEEN UNABLE TO.    PLEASE CONTACT PATIENTS DAUGHTER TO ADVISE ASAP.    Does the patient have less than a 3 day supply:  [x] Yes  [] No    Would you like a call back once the refill request has been completed: [] Yes [x] No    If the office needs to give you a call back, can they leave a voicemail: [] Yes [x] No    Amy Montez   04/30/25 14:19 EDT

## 2025-05-01 ENCOUNTER — TELEPHONE (OUTPATIENT)
Dept: INTERNAL MEDICINE | Facility: CLINIC | Age: OVER 89
End: 2025-05-01
Payer: MEDICARE

## 2025-05-01 NOTE — TELEPHONE ENCOUNTER
----- Message from Sharon Parry sent at 5/1/2025  4:23 PM EDT -----  She is supposed to be on eliquis 2.5 mg BID. Please send new prescription.  ----- Message -----  From: Veronica Cabrera MA  Sent: 5/1/2025   1:42 PM EDT  To: ELSA Brewer    Patient states that she previously took Eliquis 2.5 mg BID and is wondering if there is a reason that it has been increased to 5 mg BID. Please advise.

## 2025-05-02 ENCOUNTER — TELEPHONE (OUTPATIENT)
Dept: INTERNAL MEDICINE | Facility: CLINIC | Age: OVER 89
End: 2025-05-02
Payer: MEDICARE

## 2025-05-02 DIAGNOSIS — Z86.73 HISTORY OF CVA (CEREBROVASCULAR ACCIDENT): Primary | ICD-10-CM

## 2025-05-02 NOTE — TELEPHONE ENCOUNTER
Spoke with Kyle pt is to resume on 2.5mg of Eliquis daughter informed to stop 5mg tabs and  2.5mg bid

## 2025-05-02 NOTE — TELEPHONE ENCOUNTER
----- Message from Sharon Parry sent at 5/2/2025  7:37 AM EDT -----  Can you call the pharmacy to verify that the Eliquis 2.5 mg BID is the correct dosage?

## 2025-05-05 ENCOUNTER — TELEPHONE (OUTPATIENT)
Dept: INTERNAL MEDICINE | Facility: CLINIC | Age: OVER 89
End: 2025-05-05
Payer: MEDICARE

## 2025-05-05 NOTE — TELEPHONE ENCOUNTER
Theresa called wanting to know Sharon's opinion on Norma traveling outside of country for about 12 days. The trip is planned for the end of July.

## 2025-05-05 NOTE — TELEPHONE ENCOUNTER
Caller: MIMI LANDAVERDE    Relationship: Emergency Contact    Best call back number:[phone]       What is the best time to reach you: ANY     Who are you requesting to speak with (clinical staff, provider,  specific staff member): CLINICAL STAFF     Do you know the name of the person who called: MIMI     What was the call regarding: PATIENTS 'S DAUGHTER IS REQUESTING A CALL TO DISCUSS MEDICAL INFORMATION ABOUT POSSIBLE TRAVELING FOR THE PATIENT .     Is it okay if the provider responds through MyChart: NO

## 2025-05-06 ENCOUNTER — TELEPHONE (OUTPATIENT)
Dept: NEUROLOGY | Facility: CLINIC | Age: OVER 89
End: 2025-05-06
Payer: MEDICARE

## 2025-05-06 NOTE — TELEPHONE ENCOUNTER
Spoke with Theresa and relayed that provider did not see any issues with the pt travelling. She verbally acknowledged.

## 2025-05-06 NOTE — TELEPHONE ENCOUNTER
Provider: DR NAIR    Caller: MIMI LANDAVERDE    Relationship to Patient: Emergency Contact    Phone Number: [phone]    Reason for Call: WANTED TO GET PROVIDER'S OPINION ON PATIENT TRAVELING. STATES THEY WOULD BE GOING OVERSEAS, ABOUT 1.5 HR FLIGHT & THEN A LAYOVER AND ANOTHER 6 HOUR FLIGHT. PLEASE REVIEW & ADVISE, THANK YOU.

## 2025-05-12 DIAGNOSIS — I10 BENIGN ESSENTIAL HYPERTENSION: ICD-10-CM

## 2025-05-12 RX ORDER — METOPROLOL TARTRATE 50 MG
50 TABLET ORAL 2 TIMES DAILY
Qty: 180 TABLET | Refills: 0 | Status: SHIPPED | OUTPATIENT
Start: 2025-05-12

## 2025-05-20 DIAGNOSIS — R51.9 CHRONIC DAILY HEADACHE: ICD-10-CM

## 2025-05-20 DIAGNOSIS — M54.81 BILATERAL OCCIPITAL NEURALGIA: ICD-10-CM

## 2025-05-24 DIAGNOSIS — M54.81 BILATERAL OCCIPITAL NEURALGIA: ICD-10-CM

## 2025-05-24 DIAGNOSIS — R51.9 CHRONIC DAILY HEADACHE: ICD-10-CM

## 2025-06-20 DIAGNOSIS — M54.81 BILATERAL OCCIPITAL NEURALGIA: ICD-10-CM

## 2025-06-20 DIAGNOSIS — R51.9 CHRONIC DAILY HEADACHE: ICD-10-CM

## 2025-06-26 ENCOUNTER — PROCEDURE VISIT (OUTPATIENT)
Dept: NEUROLOGY | Facility: CLINIC | Age: OVER 89
End: 2025-06-26
Payer: MEDICARE

## 2025-06-26 DIAGNOSIS — M54.81 BILATERAL OCCIPITAL NEURALGIA: Primary | ICD-10-CM

## 2025-06-26 RX ORDER — METHYLPREDNISOLONE ACETATE 40 MG/ML
40 INJECTION, SUSPENSION INTRA-ARTICULAR; INTRALESIONAL; INTRAMUSCULAR; SOFT TISSUE ONCE
Status: COMPLETED | OUTPATIENT
Start: 2025-06-26 | End: 2025-06-26

## 2025-06-26 RX ORDER — LIDOCAINE HYDROCHLORIDE 20 MG/ML
1 INJECTION, SOLUTION EPIDURAL; INFILTRATION; INTRACAUDAL; PERINEURAL ONCE
Status: COMPLETED | OUTPATIENT
Start: 2025-06-26 | End: 2025-06-26

## 2025-06-26 RX ADMIN — METHYLPREDNISOLONE ACETATE 40 MG: 40 INJECTION, SUSPENSION INTRA-ARTICULAR; INTRALESIONAL; INTRAMUSCULAR; SOFT TISSUE at 12:22

## 2025-06-26 RX ADMIN — LIDOCAINE HYDROCHLORIDE 1 ML: 20 INJECTION, SOLUTION EPIDURAL; INFILTRATION; INTRACAUDAL; PERINEURAL at 12:22

## 2025-06-26 NOTE — PROGRESS NOTES
"Procedure   Nerve Block    Date/Time: 6/26/2025 12:17 PM    Performed by: Jeremiah Garcia II, MD  Authorized by: Jeremiah Garcia II, MD  Consent: Verbal consent obtained. Written consent obtained  Risks and benefits: risks, benefits and alternatives were discussed  Consent given by: patient  Patient understanding: patient states understanding of the procedure being performed  Patient consent: the patient's understanding of the procedure matches consent given  Procedure consent: procedure consent matches procedure scheduled  Required items: required blood products, implants, devices, and special equipment available  Patient identity confirmed: verbally with patient and provided demographic data  Time out: Immediately prior to procedure a \"time out\" was called to verify the correct patient, procedure, equipment, support staff and site/side marked as required.  Indications comments: occipital neuralgia  Body area: head  Nerve: greater occipital  Laterality: bilateral.    Sedation:  Patient sedated: no    Patient position: sitting  Needle size: 25 G  Location technique: anatomical landmarks  Local Anesthetic: lidocaine 2% without epinephrine  Patient tolerance: Patient tolerated the procedure well with no immediate complications  Comments: 1 mL of both Depo-Medrol and 2% lidocaine without epinephrine were drawn into two 3 mL syringes. 1 mL of this mixture was injected at the site of the greater occipital nerve, bilaterally, in a fan-shaped distribution.       "

## 2025-07-11 DIAGNOSIS — R60.0 BILATERAL LEG EDEMA: ICD-10-CM

## 2025-07-11 RX ORDER — HYDROCHLOROTHIAZIDE 25 MG/1
25 TABLET ORAL DAILY
Qty: 90 TABLET | Refills: 1 | OUTPATIENT
Start: 2025-07-11

## 2025-07-14 ENCOUNTER — OFFICE VISIT (OUTPATIENT)
Dept: INTERNAL MEDICINE | Facility: CLINIC | Age: OVER 89
End: 2025-07-14
Payer: MEDICARE

## 2025-07-14 VITALS
HEART RATE: 90 BPM | SYSTOLIC BLOOD PRESSURE: 112 MMHG | OXYGEN SATURATION: 98 % | HEIGHT: 60 IN | DIASTOLIC BLOOD PRESSURE: 70 MMHG | WEIGHT: 143.5 LBS | TEMPERATURE: 97.8 F | BODY MASS INDEX: 28.17 KG/M2

## 2025-07-14 DIAGNOSIS — E55.9 VITAMIN D DEFICIENCY: ICD-10-CM

## 2025-07-14 DIAGNOSIS — Z86.73 HISTORY OF CVA (CEREBROVASCULAR ACCIDENT): ICD-10-CM

## 2025-07-14 DIAGNOSIS — R35.0 URINARY FREQUENCY: ICD-10-CM

## 2025-07-14 DIAGNOSIS — R73.03 PREDIABETES: ICD-10-CM

## 2025-07-14 DIAGNOSIS — R79.9 ABNORMAL FINDING OF BLOOD CHEMISTRY, UNSPECIFIED: ICD-10-CM

## 2025-07-14 DIAGNOSIS — I48.0 PAF (PAROXYSMAL ATRIAL FIBRILLATION): ICD-10-CM

## 2025-07-14 DIAGNOSIS — K21.9 GASTROESOPHAGEAL REFLUX DISEASE, UNSPECIFIED WHETHER ESOPHAGITIS PRESENT: ICD-10-CM

## 2025-07-14 DIAGNOSIS — Z95.0 CARDIAC PACEMAKER IN SITU: ICD-10-CM

## 2025-07-14 DIAGNOSIS — I10 BENIGN ESSENTIAL HYPERTENSION: ICD-10-CM

## 2025-07-14 DIAGNOSIS — Z23 IMMUNIZATION DUE: ICD-10-CM

## 2025-07-14 DIAGNOSIS — Z00.00 MEDICARE ANNUAL WELLNESS VISIT, SUBSEQUENT: Primary | ICD-10-CM

## 2025-07-14 DIAGNOSIS — K12.0 APHTHOUS ULCER: ICD-10-CM

## 2025-07-14 DIAGNOSIS — K13.0 CHEILITIS: ICD-10-CM

## 2025-07-14 DIAGNOSIS — F41.8 ANXIETY WITH DEPRESSION: ICD-10-CM

## 2025-07-14 DIAGNOSIS — G43.909 MIGRAINE WITHOUT STATUS MIGRAINOSUS, NOT INTRACTABLE, UNSPECIFIED MIGRAINE TYPE: ICD-10-CM

## 2025-07-14 DIAGNOSIS — R82.998 LEUKOCYTES IN URINE: ICD-10-CM

## 2025-07-14 LAB
25(OH)D3+25(OH)D2 SERPL-MCNC: 33.4 NG/ML (ref 30–100)
ALBUMIN SERPL-MCNC: 4 G/DL (ref 3.5–5.2)
ALBUMIN/GLOB SERPL: 1.4 G/DL
ALP SERPL-CCNC: 109 U/L (ref 39–117)
ALT SERPL-CCNC: 43 U/L (ref 1–33)
AST SERPL-CCNC: 37 U/L (ref 1–32)
BASOPHILS # BLD AUTO: 0.02 10*3/MM3 (ref 0–0.2)
BASOPHILS NFR BLD AUTO: 0.3 % (ref 0–1.5)
BILIRUB BLD-MCNC: NEGATIVE MG/DL
BILIRUB SERPL-MCNC: 1.1 MG/DL (ref 0–1.2)
BUN SERPL-MCNC: 19 MG/DL (ref 8–23)
BUN/CREAT SERPL: 16.4 (ref 7–25)
CALCIUM SERPL-MCNC: 9.6 MG/DL (ref 8.2–9.6)
CHLORIDE SERPL-SCNC: 105 MMOL/L (ref 98–107)
CHOLEST SERPL-MCNC: 155 MG/DL (ref 0–200)
CLARITY, POC: CLEAR
CO2 SERPL-SCNC: 27.8 MMOL/L (ref 22–29)
COLOR UR: YELLOW
CREAT SERPL-MCNC: 1.16 MG/DL (ref 0.57–1)
EGFRCR SERPLBLD CKD-EPI 2021: 43.5 ML/MIN/1.73
EOSINOPHIL # BLD AUTO: 0.16 10*3/MM3 (ref 0–0.4)
EOSINOPHIL NFR BLD AUTO: 2.6 % (ref 0.3–6.2)
ERYTHROCYTE [DISTWIDTH] IN BLOOD BY AUTOMATED COUNT: 12.7 % (ref 12.3–15.4)
EXPIRATION DATE: ABNORMAL
GLOBULIN SER CALC-MCNC: 2.9 GM/DL
GLUCOSE SERPL-MCNC: 113 MG/DL (ref 65–99)
GLUCOSE UR STRIP-MCNC: NEGATIVE MG/DL
HBA1C MFR BLD: 5.9 % (ref 4.8–5.6)
HCT VFR BLD AUTO: 44.2 % (ref 34–46.6)
HDLC SERPL-MCNC: 51 MG/DL (ref 40–60)
HGB BLD-MCNC: 14.2 G/DL (ref 12–15.9)
IMM GRANULOCYTES # BLD AUTO: 0.02 10*3/MM3 (ref 0–0.05)
IMM GRANULOCYTES NFR BLD AUTO: 0.3 % (ref 0–0.5)
KETONES UR QL: NEGATIVE
LDLC SERPL CALC-MCNC: 76 MG/DL (ref 0–100)
LDLC/HDLC SERPL: 1.39 {RATIO}
LEUKOCYTE EST, POC: ABNORMAL
LYMPHOCYTES # BLD AUTO: 2.39 10*3/MM3 (ref 0.7–3.1)
LYMPHOCYTES NFR BLD AUTO: 38.9 % (ref 19.6–45.3)
Lab: ABNORMAL
MCH RBC QN AUTO: 31.6 PG (ref 26.6–33)
MCHC RBC AUTO-ENTMCNC: 32.1 G/DL (ref 31.5–35.7)
MCV RBC AUTO: 98.4 FL (ref 79–97)
MONOCYTES # BLD AUTO: 0.56 10*3/MM3 (ref 0.1–0.9)
MONOCYTES NFR BLD AUTO: 9.1 % (ref 5–12)
NEUTROPHILS # BLD AUTO: 3 10*3/MM3 (ref 1.7–7)
NEUTROPHILS NFR BLD AUTO: 48.8 % (ref 42.7–76)
NITRITE UR-MCNC: NEGATIVE MG/ML
NRBC BLD AUTO-RTO: 0 /100 WBC (ref 0–0.2)
PH UR: 7 [PH] (ref 5–8)
PLATELET # BLD AUTO: 197 10*3/MM3 (ref 140–450)
POTASSIUM SERPL-SCNC: 4.9 MMOL/L (ref 3.5–5.2)
PROT SERPL-MCNC: 6.9 G/DL (ref 6–8.5)
PROT UR STRIP-MCNC: NEGATIVE MG/DL
RBC # BLD AUTO: 4.49 10*6/MM3 (ref 3.77–5.28)
RBC # UR STRIP: NEGATIVE /UL
SODIUM SERPL-SCNC: 142 MMOL/L (ref 136–145)
SP GR UR: 1.01 (ref 1–1.03)
TRIGL SERPL-MCNC: 165 MG/DL (ref 0–150)
TSH SERPL DL<=0.005 MIU/L-ACNC: 1.81 UIU/ML (ref 0.27–4.2)
UROBILINOGEN UR QL: ABNORMAL
VLDLC SERPL CALC-MCNC: 28 MG/DL (ref 5–40)
WBC # BLD AUTO: 6.15 10*3/MM3 (ref 3.4–10.8)

## 2025-07-14 PROCEDURE — 99214 OFFICE O/P EST MOD 30 MIN: CPT | Performed by: NURSE PRACTITIONER

## 2025-07-14 PROCEDURE — 1125F AMNT PAIN NOTED PAIN PRSNT: CPT | Performed by: NURSE PRACTITIONER

## 2025-07-14 PROCEDURE — G0439 PPPS, SUBSEQ VISIT: HCPCS | Performed by: NURSE PRACTITIONER

## 2025-07-14 PROCEDURE — G0009 ADMIN PNEUMOCOCCAL VACCINE: HCPCS | Performed by: NURSE PRACTITIONER

## 2025-07-14 PROCEDURE — 1159F MED LIST DOCD IN RCRD: CPT | Performed by: NURSE PRACTITIONER

## 2025-07-14 PROCEDURE — 90677 PCV20 VACCINE IM: CPT | Performed by: NURSE PRACTITIONER

## 2025-07-14 PROCEDURE — 81003 URINALYSIS AUTO W/O SCOPE: CPT | Performed by: NURSE PRACTITIONER

## 2025-07-14 PROCEDURE — 1160F RVW MEDS BY RX/DR IN RCRD: CPT | Performed by: NURSE PRACTITIONER

## 2025-07-14 PROCEDURE — 1170F FXNL STATUS ASSESSED: CPT | Performed by: NURSE PRACTITIONER

## 2025-07-14 RX ORDER — NYSTATIN 100000 U/G
1 CREAM TOPICAL 2 TIMES DAILY
Qty: 30 G | Refills: 0 | Status: SHIPPED | OUTPATIENT
Start: 2025-07-14

## 2025-07-14 RX ORDER — SERTRALINE HYDROCHLORIDE 25 MG/1
25 TABLET, FILM COATED ORAL DAILY
Qty: 90 TABLET | Refills: 1 | Status: SHIPPED | OUTPATIENT
Start: 2025-07-14

## 2025-07-14 RX ORDER — LIDOCAINE HYDROCHLORIDE 20 MG/ML
5 SOLUTION OROPHARYNGEAL 3 TIMES DAILY PRN
Qty: 100 ML | Refills: 0 | Status: SHIPPED | OUTPATIENT
Start: 2025-07-14

## 2025-07-14 NOTE — ASSESSMENT & PLAN NOTE
Check labs today.  Encouraged her to continue to focus on healthy eating and regular exercise.  Orders:    Comprehensive Metabolic Panel    Hemoglobin A1c

## 2025-07-14 NOTE — ASSESSMENT & PLAN NOTE
{General Headache A/P Smartblock (Optional):[id number]}  Managed per neurology.  Encouraged her to continue routine follow-up.  Monitor for any symptoms of serotonin syndrome with amitriptyline and sertraline together.

## 2025-07-14 NOTE — PATIENT INSTRUCTIONS
Medicare Wellness  Personal Prevention Plan of Service     Date of Office Visit:    Encounter Provider:  ELSA Brewer  Place of Service:  [facility] INTERNAL MEDICINE  Patient Name: Norma Trejo  :  1930    As part of the Medicare Wellness portion of your visit today, we are providing you with this personalized preventive plan of services (PPPS). This plan is based upon recommendations of the [place] Preventive Services Task Force (USPSTF) and the Advisory Committee on Immunization Practices (ACIP).    This lists the preventive care services that should be considered, and provides dates of when you are due. Items listed as completed are up-to-date and do not require any further intervention.    Health Maintenance   Topic Date Due    TDAP/TD VACCINES (1 - Tdap) Never done    ZOSTER VACCINE (1 of 2) Never done    RSV Vaccine - Adults (1 - 1-dose 75+ series) Never done    COVID-19 Vaccine ( - - season) 2024    INFLUENZA VACCINE  10/01/2025    ANNUAL WELLNESS VISIT  2026    Pneumococcal Vaccine 50+  Completed    DXA SCAN  Discontinued       Orders Placed This Encounter   Procedures    Urine Culture - Urine, Urine, Clean Catch     Release to patient:   Routine Release [[id number]]    Pneumococcal Conjugate Vaccine 20-Valent (<18 yrs)    Comprehensive Metabolic Panel     Release to patient:   Routine Release [[id number]]    Lipid Panel With LDL / HDL Ratio     Release to patient:   Routine Release [[id number]]    Vitamin D,25-Hydroxy     Release to patient:   Routine Release [[id number]]    TSH Rfx On Abnormal To Free T4     Release to patient:   Routine Release [[id number]]    Hemoglobin A1c     Release to patient:   Routine Release [[id number]]    POC Urinalysis Dipstick, Automated     Release to patient:   Routine Release [[id number]]    CBC & Differential     Manual Differential:   No     Release to patient:   Routine Release [[id number]]       Return  in about 6 months (around 1/14/2026).        Prevención de caídas en el hogar, en adultos  Fall Prevention in the Home, Adult  Las caídas pueden causar lesiones y afectar a personas de todas las edades. Hay muchas cosas simples que puede hacer para que stewart casa sea un lugar seguro y ayudar a prevenir las caídas.  Pida ayuda cuando realice estos cambios, si la necesita.  ¿Qué medidas puedo leobardo para prevenir caídas?  Información general  Use luna buena iluminación en todos los ambientes. Asegúrese de hacer lo siguiente:  Reemplace las bombillas que se hayan quemado.  Encienda las luces si está oscuro y use luces nocturnas.  Mantenga los objetos que usa con frecuencia en lugares de fácil acceso. Baje los estantes de toda la casa de ser necesario.  Disponga los muebles de modo dawit que haya espacio para caminar a stewart alrededor.  No tenga alfombras ni otros objetos en el piso que puedan hacerlo tropezar.  Si los pisos están desparejos, repárelos.  Agregue pintura o cinta de color o contraste para marcar con claridad y poder katina:  Las barras para sostén o los pasamanos.  El primer y el último escalón de las escaleras.  Dónde está el borde de cada escalón.  Si usa luna marcello o luna marcello de mano:  Asegúrese de que esté abierta por completo. No suba a luna marcello cerrada.  Asegúrese de que ambos lados de la marcello estén asegurados en stewart lugar.  Pídale a alguien que sostenga la marcello mientras usted la esté usando.  Sepa dónde están olya mascotas cuando se desplace por stewart casa.  ¿Qué puedo hacer en el baño?         Mantenga el piso seco. Limpie de inmediato el agua que esté en piso.  Elimine la acumulación de jabón en la bañera o la ducha. La acumulación hace que las bañeras y duchas bette resbaladizas.  Utilice alfombras o pegatinas antideslizantes en el piso de la bañera o la ducha.  Asegure las alfombras del baño con luna cinta antideslizante doble libia para alfombras.  Si necesita sentarse mientras se ducha, use un  banco antideslizante.  Instale barras para sostén al lado del inodoro y en la bañera y la ducha. No use los toalleros david barras para sostén.  ¿Qué puedo hacer en el dormitorio?  Asegúrese de tener luna leatha junto a la cama que sea fácil de alcanzar.  No use sábanas ni mantas para la cama que caigan al piso.  Tenga un banco o luna silla firme con brazos laterales que pueda usar david apoyo cuando se vista.  ¿Qué puedo hacer en la cocina?  Limpie de inmediato cualquier derrame.  Si necesita alcanzar algo que esté alto, use un banco marcello firme con luna yancy para sostén.  Mantenga los cables eléctricos fuera del janette.  No use un pulidor o cera para pisos que dejen los pisos resbaladizos.  ¿Qué puedo hacer con las escaleras?  No deje nada en las escaleras.  Asegúrese de tener un interruptor de leatha en la parte superior e inferior de las escaleras. Si no lo tiene, instale josiane.  Asegúrese de que haya pasamanos en ambos lados de las escaleras. Repare los pasamanos que estén flojos o rotos. Asegúrese de que los pasamanos tengan la misma longitud que las escaleras.  Instale peldaños antideslizantes en todas las escaleras de stewart casa si no tienen alfombra.  Evite colocar alfombras en la parte superior o inferior de las escaleras, o asegure las alfombras con cinta adhesiva para alfombras a fin de evitar que se muevan.  Para las escaleras, elija un diseño de alfombra que no oculte el borde de los escalones. Asegúrese de que las alfombras estén mitchell adheridas a las escaleras. Arregle las alfombras flojas o gastadas.  ¿Qué puedo hacer en el exterior de mi casa?  Use luna iluminación brillante en el exterior.  Repare periódicamente los bordes de los pasillos y las entradas, así david las grietas. Retire los objetos que estén en el janette y que puedan hacer que se tropiece, david herramientas o piedras.  Agregue pintura o cinta de color o contraste para marcar con claridad y poder katina los umbrales en las johnnie que bette altos.  Pode  los arbustos o los árboles que se encuentren en el sendero principal que lleva a stewart casa.  Verifique que los pasamanos estén mitchell ajustados y en buen estado. Ambos lados de los escalones deben tener pasamanos.  Instale barandillas de protección en los bordes de las mahoney o galerías elevadas.  Limpie regularmente las hojas, la ammon y el hielo. Use arena, sal o un fundidor de hielo en los senderos si vive donde hay hielo y ammon quyen los meses de invierno.  En el garaje, limpie de inmediato cualquier derrame, incluidos los derrames de grasa y aceite.  ¿Qué otras medidas puedo leobardo?  Revise los medicamentos con el médico. Algunos medicamentos pueden causarle confusión o mareo. Sweetser puede aumentar el riesgo de caerse.  Use calzado cerrado en los dedos que le calce mitchell y le amortigüe los pies. Use calzado con suela de goma y taco bajo.  Use un bastón, un andador, un escúter o muletas que lo ayuden a moverse si es necesario.  Hable con el médico sobre otras maneras de reducir el riesgo de caídas. Sweetser puede incluir katina a un fisioterapeuta con el fin de aprender a hacer ejercicios para mejorar el movimiento y fortalecerse.  Dónde buscar más información  Centers for Disease Control and Prevention (Centros para el Control y la Prevención de Enfermedades), STEADI: cdc.gov  National [place] on Aging (Instituto Nacional sobre el Envejecimiento): myke.nih.gov  [employer] [place] on Aging (Instituto Nacional sobre el Envejecimiento): myke.nih.gov  Comuníquese con un médico si:  Tiene miedo de caerse en stewart casa.  Se siente débil, somnoliento o mareado en stewart casa.  Se  en stewart casa.  Solicite ayuda de inmediato si:  Pierde la conciencia o tiene problemas para moverse luego de luna caída.  Tiene luna caída que causa luna lesión en la mary.  Estos síntomas pueden indicar luna emergencia. Solicite ayuda de inmediato. Llame al 911.  No espere a katina si los síntomas desaparecen.  No conduzca por olya propios medios hasta el  [facility].  Esta información no tiene david fin reemplazar el consejo del médico. Asegúrese de hacerle al médico cualquier pregunta que tenga.  Document Revised: 09/20/2023 Document Reviewed: 09/20/2023  Elsevier Patient Education © 2024 [employer]  Hacer ejercicio para mantenerse stefan  Exercising to Stay Healthy  Para estar stefan y mantenerse así, es recomendable hacer ejercicio de intensidad moderada y de intensidad vigorosa. Puede saber si está haciendo ejercicio de intensidad moderada si stewart corazón comienza a latir más rápido y stewart respiración se vuelve más rápida, nic aún puede mantener luna conversación. Puede saber que está haciendo ejercicio de intensidad vigorosa si respira con mucha más dificultad y rapidez, y no puede mantener luna conversación.  ¿Cómo puede beneficiarme el ejercicio?  Hacer actividad física con regularidad es muy importante. Tiene muchos otros beneficios, david por ejemplo:  Mejora el estado físico general, la flexibilidad y la resistencia.  Aumenta la densidad ósea.  Ayuda a controlar el peso.  Disminuye la grasa corporal.  Aumenta la fuerza y la resistencia muscular.  Reduce el estrés y la tensión, la ansiedad, la depresión o la casey.  Mejora el estado de arline general.  ¿Qué pautas gene seguir mientras hago ejercicio?  Hable con el médico antes de comenzar un programa nuevo de actividad física.  No souleymane ejercicio en exceso que pudiera hacer que se lastime, se sienta mareado o tenga dificultad para respirar.  Use ropa cómoda y calzado con buen soporte.  Makenna gran cantidad de agua mientras hace ejercicio para evitar la deshidratación o los golpes de calor.  Souleymane ejercicio hasta que se aceleren stewart respiración y olya latidos cardíacos (intensidad moderada).  ¿Con qué frecuencia debería hacer ejercicio?  Elija luna actividad que disfrute y establezca objetivos realistas. El médico puede ayudarlo a elaborar un plan de actividades, diseñado de manera individual y que funcione mejor para  usted.  Souleymane actividad física habitualmente david se lo haya indicado el médico. Durand puede incluir:  Hacer ejercicios de fortalecimiento muscular dos veces a la semana, david:  Levantamiento de pesas.  Uso de bandas elásticas de resistencia.  Flexiones de brazos.  Abdominales.  Yoga.  Realizar ejercicio de luna cierta intensidad quyen luna cantidad determinada de tiempo. Elija entre estas opciones:  Un total de 150 minutos de ejercicio de intensidad moderada cada semana.  Un total de 75 minutos de ejercicio de intensidad vigorosa cada semana.  Luna mezcla de ejercicio de intensidad moderada y vigorosa cada semana.  Los niños, las mujeres embarazadas, las personas que no bo hecho actividad física con regularidad, las personas que tienen sobrepeso y los adultos mayores dawit vez tengan que consultar a un médico sobre qué actividades son seguras para realizar. Si tiene alguna afección, asegúrese de consultar al médico antes de comenzar un programa de ejercicios nuevo.  ¿Cuáles son algunas ideas para hacer ejercicio?  Algunas ideas de ejercicio de intensidad moderada incluyen:  Caminar 1 bg (1.6 km) en aproximadamente 15 minutos.  Andar en bicicleta.  Practicar senderismo.  Jugar al golf.  Bailar.  Gimnasia acuática.  Algunas ideas de ejercicio de intensidad vigorosa incluyen:  Caminar 4.5 millas (7.2 km) o más en aproximadamente luna hora.  Trotar o correr 5 millas (8 km) en aproximadamente luna hora.  Andar en bicicleta 10 millas (16.1 km) o más en aproximadamente luna hora.  Practicar natación.  Practicar patinaje de viki o en línea.  Hacer esquí de fondo.  Hacer deportes competitivos vigorosos, david fútbol americano, básquet y fútbol.  Saltar la cuerda.  Stone clases de baile aeróbico.  ¿Cuáles son algunas actividades diarias que pueden ayudarme a hacer ejercicio?  Trabajar en el kaia, david:  Empujar luna cortadora de césped.  Juntar y embolsar hojas.  Paulo el automóvil.  Empujar un cochecito.  Palear  ammon.  Cuidar el kaia.  Paulo las ventanas o los pisos.  ¿Cómo puedo ser más activo en mis actividades diarias?  Utilice las escaleras en lugar del ascensor.  Vaya a caminar quyen stewart hora de almuerzo.  Si conduce, estacione el automóvil más lejos del trabajo o de la escuela.  Si usa transporte público, bájese luna larson antes y camine el zulma del janette.  Póngase de pie o camine quyen todas las llamadas telefónicas que souleymane mientras está adentro.  Levántese, estírese y camine cada 30 minutos a lo jessica del día.  Souleymane ejercicio con un amigo. El apoyo para continuar haciendo ejercicio lo ayudará a mantener luna rutina de actividad frecuente.  Dónde obtener más información  Puede obtener más información acerca de cómo hacer actividad física para mantenerse stefan en:  U[place] Department of Health and Human Services (Departamento de Arline y Servicios Humanos de los [place]): www.hhs.gov  Centers for Disease Control and Prevention (CDC) (Centros para el Control y la Prevención de Enfermedades): www.cdc.gov  Resumen  Hacer actividad física con regularidad es muy importante. Mejorará el estado físico general, la flexibilidad y la resistencia.  El ejercicio regular también mejorará la arline general. Puede ayudarlo a controlar stewart peso, reducir el estrés y mejorar la densidad ósea.  No souleymane ejercicio en exceso que pudiera hacer que se lastime, se sienta mareado o tenga dificultad para respirar.  Hable con el médico antes de comenzar un programa nuevo de actividad física.  Esta información no tiene david fin reemplazar el consejo del médico. Asegúrese de hacerle al médico cualquier pregunta que tenga.  Document Revised: 04/28/2022 Document Reviewed: 04/28/2022  Elsevier Patient Education © 2024 [employer] Inc.

## 2025-07-14 NOTE — PROGRESS NOTES
Subjective   The ABCs of the Annual Wellness Visit  Medicare Wellness Visit      Norma Trejo is a 89 y.o. patient who presents for a Medicare Wellness Visit.    The following portions of the patient's history were reviewed and   updated as appropriate: allergies, current medications, past family history, past medical history, past social history, past surgical history, and problem list.    Compared to one year ago, the patient's physical   health is better.  Compared to one year ago, the patient's mental   health is the same.    Recent Hospitalizations:  This patient has had a [facility] admission record on file within the last 365 days.  Current Medical Providers:  Patient Care Team:  Sharon Parry APRN as PCP - General (Nurse Practitioner)  Angelina Hebert APRN as Nurse Practitioner (Gastroenterology)  Cindy Daniels MD as Surgeon (Orthopedic Surgery)  Meek Fragoso APRN as Nurse Practitioner (Gastroenterology)  Jeremiah Garcia II, MD as Consulting Physician (Neurology)    Outpatient Medications Prior to Visit   Medication Sig Dispense Refill    albuterol sulfate  (90 Base) MCG/ACT inhaler Inhale 2 puffs Every 4 (Four) Hours As Needed for Wheezing. 8 g 1    amitriptyline (ELAVIL) 25 MG tablet TAKE 1.5 TABLET BY MOUTH ONCE NIGHTLY 45 tablet 1    amLODIPine (NORVASC) 2.5 MG tablet Take 1 tablet by mouth Every Night. 90 tablet 1    apixaban (ELIQUIS) 2.5 MG tablet tablet Take 1 tablet by mouth 2 (Two) Times a Day. 180 tablet 0    atorvastatin (LIPITOR) 20 MG tablet TAKE 1 TABLET BY MOUTH DAILY 90 tablet 1    cholecalciferol (VITAMIN D3) 25 MCG (1000 UT) tablet Take 1 tablet by mouth Daily.      furosemide (Lasix) 20 MG tablet Take 1 tablet by mouth Daily. Can take a second tablet daily as needed for swelling. 90 tablet 2    linaclotide (Linzess) 72 MCG capsule capsule Take 1 capsule by mouth Every Morning Before Breakfast. 30 capsule 5    metoprolol tartrate (LOPRESSOR) 50 MG tablet  "TAKE 1 TABLET BY MOUTH 2 TIMES A  tablet 0    pantoprazole (PROTONIX) 40 MG EC tablet Take 1 tablet by mouth Daily. 30 tablet 5    potassium chloride 10 MEQ CR tablet Take 1 tablet by mouth Daily. 90 tablet 2    aspirin 81 MG EC tablet Take 1 tablet by mouth Daily.      prednisoLONE acetate (PRED FORTE) 1 % ophthalmic suspension  (Patient not taking: Reported on 7/14/2025)       No facility-administered medications prior to visit.     No opioid medication identified on active medication list. I have reviewed chart for other potential  high risk medication/s and harmful drug interactions in the elderly.      Aspirin is on active medication list. Aspirin use is not indicated based on review of current medical condition/s. Risk of harm outweighs potential benefits. Patient instructed to discontinue this medication.  .      Patient Active Problem List   Diagnosis    History of CVA (cerebrovascular accident)    Benign essential hypertension    Cardiac pacemaker in situ    Current use of long term anticoagulation    Hypertensive encephalopathy    PAF (paroxysmal atrial fibrillation)    Sinus bradycardia    Syncope and collapse    Vitamin D deficiency    Herpes labialis    Migraine    Anxiety with depression    Primary insomnia    GERD (gastroesophageal reflux disease)    Prediabetes    Fall    Status post fall     Advance Care Planning Advance Directive is not on file.  ACP discussion was held with the patient during this visit. Patient has an advance directive (not in EMR), copy requested.            Objective   Vitals:    07/14/25 0819   BP: 112/70   BP Location: Left arm   Patient Position: Sitting   Cuff Size: Adult   Pulse: 90   Temp: 97.8 °F (36.6 °C)   TempSrc: Oral   SpO2: 98%   Weight: 65.1 kg (143 lb 8 oz)   Height: 152.4 cm (60\")   PainSc: 5        Estimated body mass index is 28.03 kg/m² as calculated from the following:    Height as of this encounter: 152.4 cm (60\").    Weight as of this encounter: " 65.1 kg (143 lb 8 oz).                Does the patient have evidence of cognitive impairment? Yes                                                                                                Health  Risk Assessment    Smoking Status:  Social History     Tobacco Use   Smoking Status Never   Smokeless Tobacco Never     Alcohol Consumption:  Social History     Substance and Sexual Activity   Alcohol Use Not Currently       Fall Risk Screen  STEADI Fall Risk Assessment was completed, and patient is at MODERATE risk for falls. Assessment completed on:2025    Depression Screening   Little interest or pleasure in doing things? Several days   Feeling down, depressed, or hopeless? Nearly every day   PHQ-2 Total Score 4   Trouble falling or staying asleep, or sleeping too much? More than half the days   Feeling tired or having little energy? More than half the days   Poor appetite or overeating? More than half the days   Feeling bad about yourself - or that you are a failure or have let yourself or your family down? Not at all   Trouble concentrating on things, such as reading the newspaper or watching television? Not at all   Moving or speaking so slowly that other people could have noticed? Or the opposite - being so fidgety or restless that you have been moving around a lot more than usual? Not at all     Thoughts that you would be better off dead, or of hurting yourself in some way? Not at all   PHQ-9 Total Score 10   If you checked off any problems, how difficult have these problems made it for you to do your work, take care of things at home, or get along with other people? Somewhat difficult        Health Habits and Functional and Cognitive Screenin/14/2025     8:00 AM   Functional & Cognitive Status   Do you have difficulty preparing food and eating? Yes   Do you have difficulty bathing yourself, getting dressed or grooming yourself? Yes   Do you have difficulty using the toilet? No   Do you have  difficulty moving around from place to place? Yes   Do you have trouble with steps or getting out of a bed or a chair? Yes   Current Diet Well Balanced Diet   Dental Exam Up to date   Eye Exam Up to date   Exercise (times per week) 6 times per week   Current Exercises Include Walking   Do you need help using the phone?  No   Are you deaf or do you have serious difficulty hearing?  Yes   Do you need help to go to places out of walking distance? Yes   Do you need help shopping? Yes   Do you need help preparing meals?  Yes   Do you need help with housework?  Yes   Do you need help with laundry? Yes   Do you need help taking your medications? Yes   Do you need help managing money? Yes   Do you ever drive or ride in a car without wearing a seat belt? No   Have you felt unusual fatigue (could be tiredness), stress, anger or loneliness in the last month? Yes   Who do you live with? Other   If you need help, do you have trouble finding someone available to you? No   Have you been bothered in the last four weeks by sexual problems? No   Do you have difficulty concentrating, remembering or making decisions? Yes           Age-appropriate Screening Schedule:  Refer to the list below for future screening recommendations based on patient's age, sex and/or medical conditions. Orders for these recommended tests are listed in the plan section. The patient has been provided with a written plan.    Health Maintenance List  Health Maintenance   Topic Date Due    TDAP/TD VACCINES (1 - Tdap) Never done    ZOSTER VACCINE (1 of 2) Never done    RSV Vaccine - Adults (1 - 1-dose 75+ series) Never done    COVID-19 Vaccine (5 - 2024-25 season) 09/01/2024    INFLUENZA VACCINE  10/01/2025    ANNUAL WELLNESS VISIT  07/14/2026    Pneumococcal Vaccine 50+  Completed    DXA SCAN  Discontinued                                                                                                                                                CMS Preventative  Services Quick Reference  Risk Factors Identified During Encounter  Fall Risk-High or Moderate: Discussed Fall Prevention in the home and Information on Fall Prevention Shared in After Visit Summary  Immunizations Discussed/Encouraged: Tdap  Dental Screening Recommended  Vision Screening Recommended    The above risks/problems have been discussed with the patient.  Pertinent information has been shared with the patient in the After Visit Summary.  An After Visit Summary and PPPS were made available to the patient.    Follow Up:   Next Medicare Wellness visit to be scheduled in 1 year.         Additional E&M Note during same encounter follows:  Patient has additional, significant, and separately identifiable condition(s)/problem(s) that require work above and beyond the Medicare Wellness Visit     Chief Complaint  Medicare Wellness-Initial Visit    Subjective   HPI  Norma is also being seen today for follow up on chronic health conditions.  She is accompanied by her daughter to translate today.  She lives at home with her daughter who is her primary caregiver.  She does note return of abscess ulcer on the inside of the lower lip.  She has also been experiencing some cracking and pain in the corners of her mouth.    HTN- Patient doing well with current medication regimen, compliant with medication schedule, denies adverse effects.  Home BP is less than 140/90.  PAF/Pacemaker- she is followed by cardiology at [place], seen annually. Next visit scheduled in November.  She denies any chest pain, palpitations, shortness of breath or syncope.  Prediabetes- she is eating a healthy diet and staying active.     Hx of CVA- she is followed by Dr. Garcia. Aspirin has been discontinued.  She is on atorvastatin 20 mg daily.  Migraine- she is seeing Dr. Garcia currently on amitriptyline 25 mg nightly.  She has been receiving trigger point injections with improvement in headaches. She has not had any migraines in the past several months.  "    Anxiety with depression- she has been feeling more anxious since stopping the sertraline. She still notes some difficulty falling asleep.  She was previously on sertraline but this was stopped when she was started on amitriptyline.    GERD- Patient doing well with current medication regimen, compliant with medication schedule, denies adverse effects.   Review of Systems   Constitutional: Negative.    HENT:  Positive for mouth sores. Negative for trouble swallowing.    Respiratory: Negative.     Cardiovascular: Negative.    Gastrointestinal: Negative.    Neurological: Negative.    Psychiatric/Behavioral:  Positive for sleep disturbance. Negative for agitation, dysphoric mood and suicidal ideas. The patient is nervous/anxious.               Objective   Vital Signs:  /70 (BP Location: Left arm, Patient Position: Sitting, Cuff Size: Adult)   Pulse 90   Temp 97.8 °F (36.6 °C) (Oral)   Ht 152.4 cm (60\")   Wt 65.1 kg (143 lb 8 oz)   SpO2 98%   BMI 28.03 kg/m²   Physical Exam  Constitutional:       Appearance: She is well-developed.   HENT:      Mouth/Throat:      Mouth: Oral lesions present.        Comments: Cheilitis present.  Aphthous ulcer present on inside of lower lip.  Neck:      Thyroid: No thyroid mass, thyromegaly or thyroid tenderness.      Vascular: No carotid bruit.      Trachea: Trachea normal.   Cardiovascular:      Rate and Rhythm: Normal rate and regular rhythm.      Chest Wall: PMI is not displaced.      Pulses:           Radial pulses are 2+ on the right side and 2+ on the left side.        Dorsalis pedis pulses are 2+ on the right side and 2+ on the left side.        Posterior tibial pulses are 2+ on the right side and 2+ on the left side.      Heart sounds: S1 normal and S2 normal.   Pulmonary:      Effort: Pulmonary effort is normal.      Breath sounds: Normal breath sounds.   Musculoskeletal:      Right lower leg: No edema.      Left lower leg: No edema.   Lymphadenopathy:      Head: "      Right side of head: No submental, submandibular, tonsillar or occipital adenopathy.      Left side of head: No submental, submandibular, tonsillar or occipital adenopathy.      Cervical: No cervical adenopathy.   Skin:     General: Skin is warm and dry.      Capillary Refill: Capillary refill takes less than 2 seconds.      Nails: There is no clubbing.   Neurological:      Mental Status: She is alert and oriented to person, place, and time.   Psychiatric:         Attention and Perception: Attention normal.         Mood and Affect: Mood and affect normal.         Speech: Speech normal.         Behavior: Behavior normal.         Thought Content: Thought content normal.         Cognition and Memory: Cognition normal.           Injection  Pnemovax 20 Injection performed in Rt arm by ELSA Brewer. Patient tolerated the procedure well without complications.  07/14/25   ELSA Brewer                Assessment and Plan      Benign essential hypertension    Very well-controlled.  Continue amlodipine 2.5 mg nightly, metoprolol 50 mg twice daily and Lasix 20 mg daily with potassium 10 mill equivalents daily.  Check labs today.  Monitor BP at home and notify persistently elevated buffed 140/90.  Orders:    Comprehensive Metabolic Panel    PAF (paroxysmal atrial fibrillation)  Managed per cardiology.  Currently on Eliquis 2.5 mg twice daily.   notify for any abnormal bruising or bleeding.  To ER with any acute head trauma.  Orders:    CBC & Differential    Comprehensive Metabolic Panel    Lipid Panel With LDL / HDL Ratio    TSH Rfx On Abnormal To Free T4    Vitamin D deficiency  Check labs today.  Orders:    Vitamin D,25-Hydroxy    Prediabetes  Check labs today.  Encouraged her to continue to focus on healthy eating and regular exercise.  Orders:    Comprehensive Metabolic Panel    Hemoglobin A1c    Gastroesophageal reflux disease, unspecified whether esophagitis present  Well-controlled on pantoprazole 40  mg daily.  Encouraged reflux precautions.  Follow-up with GI as scheduled.  Orders:    CBC & Differential    Comprehensive Metabolic Panel    Anxiety with depression    Restart low-dose sertraline 25 mg daily.  Recommend starting with a half a tablet initially for the first 2 to 4 weeks to see how she tolerates this.  She will notify me with symptoms in 4 to 6 weeks.  Notify for any symptoms of serotonin syndrome.  Orders:    sertraline (Zoloft) 25 MG tablet; Take 1 tablet by mouth Daily.    CBC & Differential    Comprehensive Metabolic Panel    TSH Rfx On Abnormal To Free T4    History of CVA (cerebrovascular accident)  Continue atorvastatin 20 mg daily.  Check labs today.  Follow-up with neurology.  Orders:    CBC & Differential    Comprehensive Metabolic Panel    Lipid Panel With LDL / HDL Ratio    Migraine without status migrainosus, not intractable, unspecified migraine type    Managed per neurology.  Encouraged her to continue routine follow-up.  Monitor for any symptoms of serotonin syndrome with amitriptyline and sertraline together.       Medicare annual wellness visit, subsequent    Orders:    POC Urinalysis Dipstick, Automated    Cardiac pacemaker in situ  Followed by cardiology.  Encouraged her to continue routine follow-up.       Cheilitis    Orders:    nystatin (MYCOSTATIN) 899941 UNIT/GM cream; Apply 1 Application topically to the appropriate area as directed 2 (Two) Times a Day.    Aphthous ulcer    Orders:    Lidocaine Viscous HCl (XYLOCAINE) 2 % solution; Take 5 mL by mouth 3 (Three) Times a Day As Needed for Mild Pain.    Abnormal finding of blood chemistry, unspecified    Orders:    Lipid Panel With LDL / HDL Ratio    Immunization due    Orders:    Pneumococcal Conjugate Vaccine 20-Valent (<18 yrs)    Leukocytes in urine    Orders:    Urine Culture - Urine, Urine, Clean Catch            Follow Up   Return in about 6 months (around 1/14/2026).  Patient was given instructions and counseling  regarding her condition or for health maintenance advice. Please see specific information pulled into the AVS if appropriate.

## 2025-07-14 NOTE — ASSESSMENT & PLAN NOTE
{Hypertension is (optional):[id number]}  Very well-controlled.  Continue amlodipine 2.5 mg nightly, metoprolol 50 mg twice daily and Lasix 20 mg daily with potassium 10 mill equivalents daily.  Check labs today.  Monitor BP at home and notify persistently elevated buffed 140/90.  Orders:    Comprehensive Metabolic Panel

## 2025-07-14 NOTE — ASSESSMENT & PLAN NOTE
{Depression A/P Block (Optional):91364}  Restart low-dose sertraline 25 mg daily.  Recommend starting with a half a tablet initially for the first 2 to 4 weeks to see how she tolerates this.  She will notify me with symptoms in 4 to 6 weeks.  Notify for any symptoms of serotonin syndrome.  Orders:    sertraline (Zoloft) 25 MG tablet; Take 1 tablet by mouth Daily.    CBC & Differential    Comprehensive Metabolic Panel    TSH Rfx On Abnormal To Free T4

## 2025-07-14 NOTE — ASSESSMENT & PLAN NOTE
Well-controlled on pantoprazole 40 mg daily.  Encouraged reflux precautions.  Follow-up with GI as scheduled.  Orders:    CBC & Differential    Comprehensive Metabolic Panel

## 2025-07-14 NOTE — ASSESSMENT & PLAN NOTE
Continue atorvastatin 20 mg daily.  Check labs today.  Follow-up with neurology.  Orders:    CBC & Differential    Comprehensive Metabolic Panel    Lipid Panel With LDL / HDL Ratio

## 2025-07-14 NOTE — ASSESSMENT & PLAN NOTE
Managed per cardiology.  Currently on Eliquis 2.5 mg twice daily.   notify for any abnormal bruising or bleeding.  To ER with any acute head trauma.  Orders:    CBC & Differential    Comprehensive Metabolic Panel    Lipid Panel With LDL / HDL Ratio    TSH Rfx On Abnormal To Free T4

## 2025-07-16 DIAGNOSIS — R60.0 BILATERAL LEG EDEMA: ICD-10-CM

## 2025-07-16 DIAGNOSIS — I10 BENIGN ESSENTIAL HYPERTENSION: ICD-10-CM

## 2025-07-16 LAB
BACTERIA UR CULT: NO GROWTH
BACTERIA UR CULT: NORMAL

## 2025-07-16 RX ORDER — AMLODIPINE BESYLATE 2.5 MG/1
2.5 TABLET ORAL NIGHTLY
Qty: 90 TABLET | Refills: 1 | Status: SHIPPED | OUTPATIENT
Start: 2025-07-16

## 2025-07-17 ENCOUNTER — TELEPHONE (OUTPATIENT)
Dept: INTERNAL MEDICINE | Facility: CLINIC | Age: OVER 89
End: 2025-07-17
Payer: MEDICARE

## 2025-07-17 NOTE — TELEPHONE ENCOUNTER
Caller: MIMI LANDAVERDE    Relationship: Emergency Contact    Best call back number: [phone]     What is the best time to reach you: ANY    Who are you requesting to speak with (clinical staff, provider,  specific staff member): PRYIA    Do you know the name of the person who called:      What was the call regarding: PATIENT'S DAUGHTER IS CALLING ABOUT SOME INCORRECT FMLA PAPER . PLEASE CALL TO DISCUSS,    Is it okay if the provider responds through MyChart: NO

## 2025-07-21 ENCOUNTER — TELEPHONE (OUTPATIENT)
Dept: INTERNAL MEDICINE | Facility: CLINIC | Age: OVER 89
End: 2025-07-21
Payer: MEDICARE

## 2025-07-21 NOTE — TELEPHONE ENCOUNTER
Caller: MANI JEFFMIMI    Relationship: Emergency Contact    Best call back number:    What is the best time to reach you:     Who are you requesting to speak with (clinical staff, provider,  specific staff member):     Do you know the name of the person who called:     What was the call regarding: PATIENTS DAUGHTER MIMI IS CALLING IN TO ASK FOR A CALL BACK FROM [facility] TO DISCUSS FMLA DOCUMENTS.

## 2025-08-11 ENCOUNTER — TELEMEDICINE (OUTPATIENT)
Dept: INTERNAL MEDICINE | Facility: CLINIC | Age: OVER 89
End: 2025-08-11
Payer: MEDICARE

## 2025-08-11 DIAGNOSIS — H00.012 HORDEOLUM EXTERNUM OF RIGHT LOWER EYELID: ICD-10-CM

## 2025-08-11 DIAGNOSIS — I10 BENIGN ESSENTIAL HYPERTENSION: Primary | ICD-10-CM

## 2025-08-11 DIAGNOSIS — I10 BENIGN ESSENTIAL HYPERTENSION: ICD-10-CM

## 2025-08-11 DIAGNOSIS — F43.0 STRESS REACTION: ICD-10-CM

## 2025-08-11 PROCEDURE — 1159F MED LIST DOCD IN RCRD: CPT | Performed by: NURSE PRACTITIONER

## 2025-08-11 PROCEDURE — 99214 OFFICE O/P EST MOD 30 MIN: CPT | Performed by: NURSE PRACTITIONER

## 2025-08-11 PROCEDURE — 1160F RVW MEDS BY RX/DR IN RCRD: CPT | Performed by: NURSE PRACTITIONER

## 2025-08-11 PROCEDURE — 1125F AMNT PAIN NOTED PAIN PRSNT: CPT | Performed by: NURSE PRACTITIONER

## 2025-08-11 RX ORDER — METOPROLOL TARTRATE 50 MG
50 TABLET ORAL 2 TIMES DAILY
Qty: 180 TABLET | Refills: 0 | Status: SHIPPED | OUTPATIENT
Start: 2025-08-11

## 2025-08-11 RX ORDER — AMLODIPINE BESYLATE 2.5 MG/1
5 TABLET ORAL NIGHTLY
Start: 2025-08-11

## 2025-08-21 ENCOUNTER — TELEPHONE (OUTPATIENT)
Dept: INTERNAL MEDICINE | Facility: CLINIC | Age: OVER 89
End: 2025-08-21
Payer: MEDICARE

## 2025-08-25 ENCOUNTER — TELEPHONE (OUTPATIENT)
Dept: INTERNAL MEDICINE | Facility: CLINIC | Age: OVER 89
End: 2025-08-25
Payer: MEDICARE

## 2025-08-25 RX ORDER — AMLODIPINE BESYLATE 5 MG/1
5 TABLET ORAL DAILY
Qty: 90 TABLET | Refills: 0 | Status: SHIPPED | OUTPATIENT
Start: 2025-08-25 | End: 2025-08-29

## 2025-08-29 ENCOUNTER — OFFICE VISIT (OUTPATIENT)
Dept: INTERNAL MEDICINE | Facility: CLINIC | Age: OVER 89
End: 2025-08-29
Payer: MEDICARE

## 2025-08-29 VITALS
HEART RATE: 63 BPM | DIASTOLIC BLOOD PRESSURE: 60 MMHG | TEMPERATURE: 98.2 F | OXYGEN SATURATION: 96 % | WEIGHT: 147.1 LBS | SYSTOLIC BLOOD PRESSURE: 110 MMHG | BODY MASS INDEX: 28.88 KG/M2 | HEIGHT: 60 IN

## 2025-08-29 DIAGNOSIS — I10 BENIGN ESSENTIAL HYPERTENSION: ICD-10-CM

## 2025-08-29 DIAGNOSIS — Z09 HOSPITAL DISCHARGE FOLLOW-UP: Primary | ICD-10-CM

## 2025-08-29 DIAGNOSIS — K76.9 LIVER LESION: ICD-10-CM
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
## 2025-08-29 RX ORDER — AMLODIPINE BESYLATE 2.5 MG/1
2.5 TABLET ORAL DAILY
Qty: 90 TABLET | Refills: 2 | Status: SHIPPED | OUTPATIENT
Start: 2025-08-29

## 2025-08-29 RX ORDER — METHOCARBAMOL 750 MG/1
750 TABLET, FILM COATED ORAL
COMMUNITY
Start: 2025-08-21

## 2025-08-29 RX ORDER — ACETAMINOPHEN 325 MG/1
650 TABLET ORAL
COMMUNITY
Start: 2025-08-21

## 2025-08-29 RX ORDER — AMLODIPINE BESYLATE 5 MG/1
5 TABLET ORAL DAILY
Start: 2025-08-29